# Patient Record
Sex: MALE | Race: WHITE | NOT HISPANIC OR LATINO | Employment: OTHER | URBAN - METROPOLITAN AREA
[De-identification: names, ages, dates, MRNs, and addresses within clinical notes are randomized per-mention and may not be internally consistent; named-entity substitution may affect disease eponyms.]

---

## 2017-08-23 ENCOUNTER — HOSPITAL ENCOUNTER (OUTPATIENT)
Dept: FAMILY MEDICINE CLINIC | Facility: CLINIC | Age: 77
Setting detail: SPECIMEN
Discharge: HOME | End: 2017-08-23
Attending: FAMILY MEDICINE | Admitting: FAMILY MEDICINE

## 2017-08-23 LAB
ALBUMIN SERPL-MCNC: 3.7 G/DL (ref 3.5–4.8)
ALBUMIN/GLOB SERPL: 1.3 {RATIO} (ref 1–1.7)
ALP SERPL-CCNC: 58 IU/L (ref 32–91)
ALT SERPL-CCNC: 18 IU/L (ref 17–63)
ANION GAP SERPL CALC-SCNC: 13.3 MMOL/L (ref 10–20)
AST SERPL-CCNC: 19 IU/L (ref 15–41)
BILIRUB SERPL-MCNC: 0.8 MG/DL (ref 0.3–1.2)
BUN SERPL-MCNC: 17 MG/DL (ref 8–20)
BUN/CREAT SERPL: 18.9 (ref 6.2–20.3)
CALCIUM SERPL-MCNC: 9.6 MG/DL (ref 8.9–10.3)
CHLORIDE SERPL-SCNC: 107 MMOL/L (ref 101–111)
CHOLEST SERPL-MCNC: 193 MG/DL
CHOLEST/HDLC SERPL: 3 {RATIO}
CONV CO2: 27 MMOL/L (ref 22–32)
CONV LDL CHOLESTEROL DIRECT: 120 MG/DL (ref 0–100)
CONV TOTAL PROTEIN: 6.5 G/DL (ref 6.1–7.9)
CREAT UR-MCNC: 0.9 MG/DL (ref 0.7–1.2)
GLOBULIN UR ELPH-MCNC: 2.8 G/DL (ref 2.5–3.8)
GLUCOSE SERPL-MCNC: 140 MG/DL (ref 65–99)
HDLC SERPL-MCNC: 65 MG/DL
LDLC/HDLC SERPL: 1.8 {RATIO}
LIPID INTERPRETATION: ABNORMAL
POTASSIUM SERPL-SCNC: 4.3 MMOL/L (ref 3.6–5.1)
SODIUM SERPL-SCNC: 143 MMOL/L (ref 136–144)
TRIGL SERPL-MCNC: 86 MG/DL
VLDLC SERPL CALC-MCNC: 8.1 MG/DL

## 2017-11-20 ENCOUNTER — HOSPITAL ENCOUNTER (OUTPATIENT)
Dept: FAMILY MEDICINE CLINIC | Facility: CLINIC | Age: 77
Setting detail: SPECIMEN
Discharge: HOME | End: 2017-11-20
Attending: FAMILY MEDICINE | Admitting: FAMILY MEDICINE

## 2017-11-20 LAB
ALBUMIN SERPL-MCNC: 3.7 G/DL (ref 3.5–4.8)
ALBUMIN/GLOB SERPL: 1.4 {RATIO} (ref 1–1.7)
ALP SERPL-CCNC: 61 IU/L (ref 32–91)
ALT SERPL-CCNC: 15 IU/L (ref 17–63)
ANION GAP SERPL CALC-SCNC: 13.1 MMOL/L (ref 10–20)
AST SERPL-CCNC: 16 IU/L (ref 15–41)
BILIRUB SERPL-MCNC: 0.7 MG/DL (ref 0.3–1.2)
BUN SERPL-MCNC: 15 MG/DL (ref 8–20)
BUN/CREAT SERPL: 16.7 (ref 6.2–20.3)
CALCIUM SERPL-MCNC: 8.9 MG/DL (ref 8.9–10.3)
CHLORIDE SERPL-SCNC: 101 MMOL/L (ref 101–111)
CONV CO2: 27 MMOL/L (ref 22–32)
CONV MICROALBUM.,U,RANDOM: <2 MG/L
CONV TOTAL PROTEIN: 6.4 G/DL (ref 6.1–7.9)
CREAT UR-MCNC: 0.9 MG/DL (ref 0.7–1.2)
GLOBULIN UR ELPH-MCNC: 2.7 G/DL (ref 2.5–3.8)
GLUCOSE SERPL-MCNC: 118 MG/DL (ref 65–99)
POTASSIUM SERPL-SCNC: 4.1 MMOL/L (ref 3.6–5.1)
SODIUM SERPL-SCNC: 137 MMOL/L (ref 136–144)

## 2018-05-15 ENCOUNTER — HOSPITAL ENCOUNTER (OUTPATIENT)
Dept: FAMILY MEDICINE CLINIC | Facility: CLINIC | Age: 78
Setting detail: SPECIMEN
Discharge: HOME | End: 2018-05-15
Attending: FAMILY MEDICINE | Admitting: FAMILY MEDICINE

## 2018-05-15 LAB
ALBUMIN SERPL-MCNC: 3.8 G/DL (ref 3.5–4.8)
ALBUMIN/GLOB SERPL: 1.4 {RATIO} (ref 1–1.7)
ALP SERPL-CCNC: 63 IU/L (ref 32–91)
ALT SERPL-CCNC: 16 IU/L (ref 17–63)
ANION GAP SERPL CALC-SCNC: 8.5 MMOL/L (ref 10–20)
AST SERPL-CCNC: 17 IU/L (ref 15–41)
BILIRUB SERPL-MCNC: 0.7 MG/DL (ref 0.3–1.2)
BUN SERPL-MCNC: 17 MG/DL (ref 8–20)
BUN/CREAT SERPL: 18.9 (ref 6.2–20.3)
CALCIUM SERPL-MCNC: 8.8 MG/DL (ref 8.9–10.3)
CHLORIDE SERPL-SCNC: 103 MMOL/L (ref 101–111)
CONV CO2: 28 MMOL/L (ref 22–32)
CONV TOTAL PROTEIN: 6.5 G/DL (ref 6.1–7.9)
CREAT UR-MCNC: 0.9 MG/DL (ref 0.7–1.2)
GLOBULIN UR ELPH-MCNC: 2.7 G/DL (ref 2.5–3.8)
GLUCOSE SERPL-MCNC: 125 MG/DL (ref 65–99)
POTASSIUM SERPL-SCNC: 3.5 MMOL/L (ref 3.6–5.1)
SODIUM SERPL-SCNC: 136 MMOL/L (ref 136–144)

## 2018-09-18 ENCOUNTER — HOSPITAL ENCOUNTER (OUTPATIENT)
Dept: FAMILY MEDICINE CLINIC | Facility: CLINIC | Age: 78
Setting detail: SPECIMEN
Discharge: HOME | End: 2018-09-18
Attending: FAMILY MEDICINE | Admitting: FAMILY MEDICINE

## 2018-09-18 LAB
ALBUMIN SERPL-MCNC: 3.4 G/DL (ref 3.5–4.8)
ALBUMIN/GLOB SERPL: 1.4 {RATIO} (ref 1–1.7)
ALP SERPL-CCNC: 55 IU/L (ref 32–91)
ALT SERPL-CCNC: 13 IU/L (ref 17–63)
ANION GAP SERPL CALC-SCNC: 10.1 MMOL/L (ref 10–20)
AST SERPL-CCNC: 13 IU/L (ref 15–41)
BILIRUB SERPL-MCNC: 0.3 MG/DL (ref 0.3–1.2)
BUN SERPL-MCNC: 11 MG/DL (ref 8–20)
BUN/CREAT SERPL: 12.2 (ref 6.2–20.3)
CALCIUM SERPL-MCNC: 8.7 MG/DL (ref 8.9–10.3)
CHLORIDE SERPL-SCNC: 106 MMOL/L (ref 101–111)
CHOLEST SERPL-MCNC: 157 MG/DL
CHOLEST/HDLC SERPL: 3 {RATIO}
CONV CO2: 28 MMOL/L (ref 22–32)
CONV LDL CHOLESTEROL DIRECT: 95 MG/DL (ref 0–100)
CONV TOTAL PROTEIN: 5.9 G/DL (ref 6.1–7.9)
CREAT UR-MCNC: 0.9 MG/DL (ref 0.7–1.2)
GLOBULIN UR ELPH-MCNC: 2.5 G/DL (ref 2.5–3.8)
GLUCOSE SERPL-MCNC: 119 MG/DL (ref 65–99)
HDLC SERPL-MCNC: 52 MG/DL
LDLC/HDLC SERPL: 1.8 {RATIO}
LIPID INTERPRETATION: NORMAL
POTASSIUM SERPL-SCNC: 4.1 MMOL/L (ref 3.6–5.1)
SODIUM SERPL-SCNC: 140 MMOL/L (ref 136–144)
TRIGL SERPL-MCNC: 96 MG/DL
VLDLC SERPL CALC-MCNC: 9.8 MG/DL

## 2019-02-11 ENCOUNTER — HOSPITAL ENCOUNTER (OUTPATIENT)
Dept: FAMILY MEDICINE CLINIC | Facility: CLINIC | Age: 79
Setting detail: SPECIMEN
Discharge: HOME | End: 2019-02-11
Attending: FAMILY MEDICINE | Admitting: FAMILY MEDICINE

## 2019-02-12 ENCOUNTER — HOSPITAL ENCOUNTER (OUTPATIENT)
Dept: FAMILY MEDICINE CLINIC | Facility: CLINIC | Age: 79
Setting detail: SPECIMEN
Discharge: HOME | End: 2019-02-12
Attending: FAMILY MEDICINE | Admitting: FAMILY MEDICINE

## 2019-02-12 LAB
ALBUMIN SERPL-MCNC: 3.8 G/DL (ref 3.5–4.8)
ALBUMIN/GLOB SERPL: 1.3 {RATIO} (ref 1–1.7)
ALP SERPL-CCNC: 64 IU/L (ref 32–91)
ALT SERPL-CCNC: 15 IU/L (ref 17–63)
ANION GAP SERPL CALC-SCNC: 15.8 MMOL/L (ref 10–20)
AST SERPL-CCNC: 18 IU/L (ref 15–41)
BILIRUB SERPL-MCNC: 0.7 MG/DL (ref 0.3–1.2)
BUN SERPL-MCNC: 18 MG/DL (ref 8–20)
BUN/CREAT SERPL: 18 (ref 6.2–20.3)
CALCIUM SERPL-MCNC: 9.2 MG/DL (ref 8.9–10.3)
CHLORIDE SERPL-SCNC: 102 MMOL/L (ref 101–111)
CHOLEST SERPL-MCNC: 179 MG/DL
CHOLEST/HDLC SERPL: 2.9 {RATIO}
CONV CO2: 26 MMOL/L (ref 22–32)
CONV LDL CHOLESTEROL DIRECT: 107 MG/DL (ref 0–100)
CONV TOTAL PROTEIN: 6.8 G/DL (ref 6.1–7.9)
CREAT UR-MCNC: 1 MG/DL (ref 0.7–1.2)
GLOBULIN UR ELPH-MCNC: 3 G/DL (ref 2.5–3.8)
GLUCOSE SERPL-MCNC: 123 MG/DL (ref 65–99)
HDLC SERPL-MCNC: 61 MG/DL
LDLC/HDLC SERPL: 1.8 {RATIO}
LIPID INTERPRETATION: ABNORMAL
POTASSIUM SERPL-SCNC: 3.8 MMOL/L (ref 3.6–5.1)
SODIUM SERPL-SCNC: 140 MMOL/L (ref 136–144)
TRIGL SERPL-MCNC: 109 MG/DL
VLDLC SERPL CALC-MCNC: 10.9 MG/DL

## 2019-06-27 RX ORDER — LEVOTHYROXINE SODIUM 0.03 MG/1
TABLET ORAL
Qty: 90 TABLET | Refills: 3 | Status: SHIPPED | OUTPATIENT
Start: 2019-06-27 | End: 2020-06-26

## 2019-06-27 RX ORDER — LOSARTAN POTASSIUM 50 MG/1
TABLET ORAL
Qty: 90 TABLET | Refills: 3 | Status: SHIPPED | OUTPATIENT
Start: 2019-06-27 | End: 2020-06-26

## 2019-06-27 RX ORDER — TRAMADOL HYDROCHLORIDE 50 MG/1
50 TABLET ORAL EVERY 8 HOURS PRN
Qty: 90 TABLET | Refills: 1 | Status: SHIPPED | OUTPATIENT
Start: 2019-06-27 | End: 2019-09-23 | Stop reason: SDUPTHER

## 2019-09-03 ENCOUNTER — HOSPITAL ENCOUNTER (OUTPATIENT)
Dept: CARDIOLOGY | Facility: HOSPITAL | Age: 79
Discharge: HOME | End: 2019-09-03
Admitting: FAMILY MEDICINE

## 2019-09-03 ENCOUNTER — OFFICE VISIT (OUTPATIENT)
Dept: FAMILY MEDICINE CLINIC | Facility: CLINIC | Age: 79
End: 2019-09-03

## 2019-09-03 VITALS
SYSTOLIC BLOOD PRESSURE: 160 MMHG | WEIGHT: 194 LBS | OXYGEN SATURATION: 99 % | HEIGHT: 64 IN | BODY MASS INDEX: 33.12 KG/M2 | HEART RATE: 74 BPM | DIASTOLIC BLOOD PRESSURE: 81 MMHG | TEMPERATURE: 97.7 F

## 2019-09-03 DIAGNOSIS — E03.9 HYPOTHYROIDISM, UNSPECIFIED TYPE: ICD-10-CM

## 2019-09-03 DIAGNOSIS — I10 HYPERTENSION, UNSPECIFIED TYPE: ICD-10-CM

## 2019-09-03 DIAGNOSIS — E11.8 DISORDER ASSOCIATED WITH TYPE 2 DIABETES MELLITUS (HCC): ICD-10-CM

## 2019-09-03 DIAGNOSIS — M79.661 RIGHT CALF PAIN: Primary | ICD-10-CM

## 2019-09-03 DIAGNOSIS — E78.5 HYPERLIPIDEMIA, UNSPECIFIED HYPERLIPIDEMIA TYPE: ICD-10-CM

## 2019-09-03 LAB
BH CV LOW VAS RIGHT POPLITEAL SPONT: 1
BH CV LOWER VASCULAR LEFT COMMON FEMORAL AUGMENT: NORMAL
BH CV LOWER VASCULAR LEFT COMMON FEMORAL COMPETENT: NORMAL
BH CV LOWER VASCULAR LEFT COMMON FEMORAL COMPRESS: NORMAL
BH CV LOWER VASCULAR LEFT COMMON FEMORAL PHASIC: NORMAL
BH CV LOWER VASCULAR LEFT COMMON FEMORAL SPONT: NORMAL
BH CV LOWER VASCULAR RIGHT COMMON FEMORAL AUGMENT: NORMAL
BH CV LOWER VASCULAR RIGHT COMMON FEMORAL COMPETENT: NORMAL
BH CV LOWER VASCULAR RIGHT COMMON FEMORAL COMPRESS: NORMAL
BH CV LOWER VASCULAR RIGHT COMMON FEMORAL PHASIC: NORMAL
BH CV LOWER VASCULAR RIGHT COMMON FEMORAL SPONT: NORMAL
BH CV LOWER VASCULAR RIGHT DISTAL FEMORAL COMPRESS: NORMAL
BH CV LOWER VASCULAR RIGHT GASTRONEMIUS COMPRESS: NORMAL
BH CV LOWER VASCULAR RIGHT GREATER SAPH AK COMPRESS: NORMAL
BH CV LOWER VASCULAR RIGHT GREATER SAPH BK COMPRESS: NORMAL
BH CV LOWER VASCULAR RIGHT LESSER SAPH COMPRESS: NORMAL
BH CV LOWER VASCULAR RIGHT MID FEMORAL AUGMENT: NORMAL
BH CV LOWER VASCULAR RIGHT MID FEMORAL COMPETENT: NORMAL
BH CV LOWER VASCULAR RIGHT MID FEMORAL COMPRESS: NORMAL
BH CV LOWER VASCULAR RIGHT MID FEMORAL PHASIC: NORMAL
BH CV LOWER VASCULAR RIGHT MID FEMORAL SPONT: NORMAL
BH CV LOWER VASCULAR RIGHT PERONEAL COMPRESS: NORMAL
BH CV LOWER VASCULAR RIGHT POPLITEAL AUGMENT: NORMAL
BH CV LOWER VASCULAR RIGHT POPLITEAL COMPETENT: NORMAL
BH CV LOWER VASCULAR RIGHT POPLITEAL COMPRESS: NORMAL
BH CV LOWER VASCULAR RIGHT POPLITEAL PHASIC: NORMAL
BH CV LOWER VASCULAR RIGHT POPLITEAL SPONT: NORMAL
BH CV LOWER VASCULAR RIGHT POSTERIOR TIBIAL COMPRESS: NORMAL
BH CV LOWER VASCULAR RIGHT PROXIMAL FEMORAL COMPRESS: NORMAL
BH CV LOWER VASCULAR RIGHT SAPHENOFEMORAL JUNCTION COMPRESS: NORMAL

## 2019-09-03 PROCEDURE — 93971 EXTREMITY STUDY: CPT

## 2019-09-03 PROCEDURE — 99213 OFFICE O/P EST LOW 20 MIN: CPT | Performed by: FAMILY MEDICINE

## 2019-09-03 RX ORDER — HYDROCODONE BITARTRATE AND ACETAMINOPHEN 10; 325 MG/1; MG/1
1 TABLET ORAL EVERY 6 HOURS PRN
Qty: 28 TABLET | Refills: 0 | Status: SHIPPED | OUTPATIENT
Start: 2019-09-03 | End: 2019-10-07 | Stop reason: SDUPTHER

## 2019-09-03 RX ORDER — TAMSULOSIN HYDROCHLORIDE 0.4 MG/1
CAPSULE ORAL
COMMUNITY
Start: 2019-06-27

## 2019-09-03 RX ORDER — SIMVASTATIN 80 MG
TABLET ORAL
COMMUNITY
Start: 2019-06-27 | End: 2019-12-23

## 2019-09-04 LAB
ALBUMIN SERPL-MCNC: 3.6 G/DL (ref 3.5–4.8)
ALBUMIN/GLOB SERPL: 1.3 G/DL (ref 1–1.7)
ALP SERPL-CCNC: 50 U/L (ref 32–91)
ALT SERPL W P-5'-P-CCNC: 12 U/L (ref 17–63)
ANION GAP SERPL CALCULATED.3IONS-SCNC: 12.6 MMOL/L (ref 5–15)
ARTICHOKE IGE QN: 107 MG/DL (ref 0–100)
AST SERPL-CCNC: 15 U/L (ref 15–41)
BASOPHILS # BLD AUTO: 0 10*3/MM3 (ref 0–0.2)
BASOPHILS NFR BLD AUTO: 0.6 % (ref 0–1.5)
BILIRUB SERPL-MCNC: 0.7 MG/DL (ref 0.3–1.2)
BUN BLD-MCNC: 21 MG/DL (ref 8–20)
BUN/CREAT SERPL: 21 (ref 6.2–20.3)
CALCIUM SPEC-SCNC: 9.2 MG/DL (ref 8.9–10.3)
CHLORIDE SERPL-SCNC: 106 MMOL/L (ref 101–111)
CHOLEST SERPL-MCNC: 176 MG/DL
CO2 SERPL-SCNC: 25 MMOL/L (ref 22–32)
CREAT BLD-MCNC: 1 MG/DL (ref 0.7–1.2)
DEPRECATED RDW RBC AUTO: 45.9 FL (ref 37–54)
EOSINOPHIL # BLD AUTO: 0.3 10*3/MM3 (ref 0–0.4)
EOSINOPHIL NFR BLD AUTO: 6.1 % (ref 0.3–6.2)
ERYTHROCYTE [DISTWIDTH] IN BLOOD BY AUTOMATED COUNT: 14.8 % (ref 12.3–15.4)
GFR SERPL CREATININE-BSD FRML MDRD: 72 ML/MIN/1.73
GLOBULIN UR ELPH-MCNC: 2.7 GM/DL (ref 2.5–3.8)
GLUCOSE BLD-MCNC: 111 MG/DL (ref 65–99)
HBA1C MFR BLD: 5.8 % (ref 3.5–5.6)
HCT VFR BLD AUTO: 42.6 % (ref 37.5–51)
HDLC SERPL QL: 3.2
HDLC SERPL-MCNC: 55 MG/DL
HGB BLD-MCNC: 14.4 G/DL (ref 13–17.7)
LDLC/HDLC SERPL: 1.85 {RATIO}
LYMPHOCYTES # BLD AUTO: 0.5 10*3/MM3 (ref 0.7–3.1)
LYMPHOCYTES NFR BLD AUTO: 11.4 % (ref 19.6–45.3)
MCH RBC QN AUTO: 30.3 PG (ref 26.6–33)
MCHC RBC AUTO-ENTMCNC: 33.9 G/DL (ref 31.5–35.7)
MCV RBC AUTO: 89.3 FL (ref 79–97)
MONOCYTES # BLD AUTO: 0.4 10*3/MM3 (ref 0.1–0.9)
MONOCYTES NFR BLD AUTO: 9 % (ref 5–12)
NEUTROPHILS # BLD AUTO: 3.4 10*3/MM3 (ref 1.7–7)
NEUTROPHILS NFR BLD AUTO: 72.9 % (ref 42.7–76)
NRBC BLD AUTO-RTO: 0 /100 WBC (ref 0–0.2)
PLATELET # BLD AUTO: 273 10*3/MM3 (ref 140–450)
PMV BLD AUTO: 7.3 FL (ref 6–12)
POTASSIUM BLD-SCNC: 4.6 MMOL/L (ref 3.6–5.1)
PROT SERPL-MCNC: 6.3 G/DL (ref 6.1–7.9)
RBC # BLD AUTO: 4.77 10*6/MM3 (ref 4.14–5.8)
SODIUM BLD-SCNC: 139 MMOL/L (ref 136–144)
TRIGL SERPL-MCNC: 97 MG/DL
TSH SERPL DL<=0.05 MIU/L-ACNC: 3.95 UIU/ML (ref 0.34–5.6)
VLDLC SERPL-MCNC: 19.4 MG/DL
WBC NRBC COR # BLD: 4.7 10*3/MM3 (ref 3.4–10.8)

## 2019-09-04 PROCEDURE — 85025 COMPLETE CBC W/AUTO DIFF WBC: CPT | Performed by: FAMILY MEDICINE

## 2019-09-04 PROCEDURE — 36415 COLL VENOUS BLD VENIPUNCTURE: CPT | Performed by: FAMILY MEDICINE

## 2019-09-04 PROCEDURE — 83036 HEMOGLOBIN GLYCOSYLATED A1C: CPT | Performed by: FAMILY MEDICINE

## 2019-09-04 PROCEDURE — 80053 COMPREHEN METABOLIC PANEL: CPT | Performed by: FAMILY MEDICINE

## 2019-09-04 PROCEDURE — 84443 ASSAY THYROID STIM HORMONE: CPT | Performed by: FAMILY MEDICINE

## 2019-09-04 PROCEDURE — 80061 LIPID PANEL: CPT | Performed by: FAMILY MEDICINE

## 2019-09-09 NOTE — PROGRESS NOTES
Subjective   Chief Complaint   Patient presents with   • Leg Pain     rt leg x 6 days     Mark Marie is a 79 y.o. male.     Leg Pain    The incident occurred 2 days ago. The incident occurred in the yard. The injury mechanism is unknown. The pain is present in the right leg and right knee. The quality of the pain is described as aching. The pain is severe. The pain has been constant since onset. Pertinent negatives include no inability to bear weight, loss of motion, loss of sensation, muscle weakness or numbness. He reports no foreign bodies present. The symptoms are aggravated by movement. He has tried nothing for the symptoms.      Past Medical History:   Diagnosis Date   • Diabetes mellitus (CMS/HCC)    • Hyperlipidemia    • Hypertension    • Hypothyroidism      History reviewed. No pertinent surgical history.  Allergies   Allergen Reactions   • Ramipril Unknown (See Comments)     Social History     Socioeconomic History   • Marital status:      Spouse name: Not on file   • Number of children: Not on file   • Years of education: Not on file   • Highest education level: Not on file   Substance and Sexual Activity   • Alcohol use: Yes     Comment: rarely;  caffeine 1c qd   • Drug use: No     Social History     Tobacco Use   Smoking Status Not on file   Smokeless Tobacco Never Used       family history includes Heart disease in his father; Hypertension in his father.  Current Outpatient Medications on File Prior to Visit   Medication Sig Dispense Refill   • metFORMIN (GLUCOPHAGE) 500 MG tablet      • simvastatin (ZOCOR) 80 MG tablet      • tamsulosin (FLOMAX) 0.4 MG capsule 24 hr capsule      • levothyroxine (SYNTHROID, LEVOTHROID) 25 MCG tablet TAKE 1 TABLET BY MOUTH DAILY 90 tablet 3   • losartan (COZAAR) 50 MG tablet TAKE 1 TABLET BY MOUTH DAILY 90 tablet 3   • traMADol (ULTRAM) 50 MG tablet Take 1 tablet by mouth Every 8 (Eight) Hours As Needed for Moderate Pain . 90 tablet 1     No current  "facility-administered medications on file prior to visit.      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    Review of Systems   Constitutional: Negative for chills and fever.   HENT: Negative for sinus pressure and sore throat.    Eyes: Negative for blurred vision.   Respiratory: Negative for cough and shortness of breath.    Cardiovascular: Negative for chest pain and palpitations.   Gastrointestinal: Negative for abdominal pain.   Endocrine: Negative for polyuria.   Skin: Negative for rash.   Neurological: Negative for dizziness, numbness and headache.   Hematological: Negative for adenopathy.   Psychiatric/Behavioral: Negative for depressed mood.       Objective   /81 (BP Location: Left arm, Patient Position: Sitting, Cuff Size: Adult)   Pulse 74   Temp 97.7 °F (36.5 °C) (Oral)   Ht 162.6 cm (64\")   Wt 88 kg (194 lb)   SpO2 99%   BMI 33.30 kg/m²   Physical Exam   Constitutional: He is oriented to person, place, and time. He appears well-developed. No distress.   HENT:   Head: Normocephalic.   Eyes: Conjunctivae and lids are normal.   Neck: Trachea normal. No thyroid mass and no thyromegaly present.   Cardiovascular: Normal rate, regular rhythm and normal heart sounds.   Pulmonary/Chest: Effort normal and breath sounds normal.   Musculoskeletal: Normal range of motion. He exhibits tenderness. He exhibits no edema or deformity.   Right posterior knee   Lymphadenopathy:     He has no cervical adenopathy.   Neurological: He is alert and oriented to person, place, and time.   Skin: Skin is warm and dry.   Psychiatric: He has a normal mood and " affect. His speech is normal and behavior is normal. He is attentive.       Office Visit on 09/03/2019   Component Date Value Ref Range Status   • Right Popliteal Spont 09/03/2019 1   Final   • Right Common Femoral Spont 09/03/2019 Y   Final   • Right Common Femoral Phasic 09/03/2019 Y   Final   • Right Common Femoral Augment 09/03/2019 Y   Final   • Right Common Femoral Competent 09/03/2019 Y   Final   • Right Common Femoral Compress 09/03/2019 C   Final   • Right Saphenofemoral Junction Comp* 09/03/2019 C   Final   • Right Proximal Femoral Compress 09/03/2019 C   Final   • Right Mid Femoral Spont 09/03/2019 Y   Final   • Right Mid Femoral Phasic 09/03/2019 Y   Final   • Right Mid Femoral Augment 09/03/2019 Y   Final   • Right Mid Femoral Competent 09/03/2019 Y   Final   • Right Mid Femoral Compress 09/03/2019 C   Final   • Right Distal Femoral Compress 09/03/2019 C   Final   • Right Popliteal Spont 09/03/2019 Y   Final   • Right Popliteal Phasic 09/03/2019 Y   Final   • Right Popliteal Augment 09/03/2019 Y   Final   • Right Popliteal Competent 09/03/2019 Y   Final   • Right Popliteal Compress 09/03/2019 C   Final   • Right Posterior Tibial Compress 09/03/2019 C   Final   • Right Peroneal Compress 09/03/2019 C   Final   • Right GastronemiusSoleal Compress 09/03/2019 C   Final   • Right Greater Saph AK Compress 09/03/2019 C   Final   • Right Greater Saph BK Compress 09/03/2019 C   Final   • Right Lesser Saph Compress 09/03/2019 C   Final   • Left Common Femoral Spont 09/03/2019 Y   Final   • Left Common Femoral Phasic 09/03/2019 Y   Final   • Left Common Femoral Augment 09/03/2019 Y   Final   • Left Common Femoral Competent 09/03/2019 Y   Final   • Left Common Femoral Compress 09/03/2019 C   Final   • Glucose 09/04/2019 111* 65 - 99 mg/dL Final   • BUN 09/04/2019 21* 8 - 20 mg/dL Final   • Creatinine 09/04/2019 1.00  0.70 - 1.20 mg/dL Final   • Sodium 09/04/2019 139  136 - 144 mmol/L Final   • Potassium 09/04/2019  4.6  3.6 - 5.1 mmol/L Final   • Chloride 09/04/2019 106  101 - 111 mmol/L Final   • CO2 09/04/2019 25.0  22.0 - 32.0 mmol/L Final   • Calcium 09/04/2019 9.2  8.9 - 10.3 mg/dL Final   • Total Protein 09/04/2019 6.3  6.1 - 7.9 g/dL Final   • Albumin 09/04/2019 3.60  3.50 - 4.80 g/dL Final   • ALT (SGPT) 09/04/2019 12* 17 - 63 U/L Final   • AST (SGOT) 09/04/2019 15  15 - 41 U/L Final   • Alkaline Phosphatase 09/04/2019 50  32 - 91 U/L Final   • Total Bilirubin 09/04/2019 0.7  0.3 - 1.2 mg/dL Final   • eGFR Non African Amer 09/04/2019 72  >60 mL/min/1.73 Final   • Globulin 09/04/2019 2.7  2.5 - 3.8 gm/dL Final   • A/G Ratio 09/04/2019 1.3  1.0 - 1.7 g/dL Final   • BUN/Creatinine Ratio 09/04/2019 21.0* 6.2 - 20.3 Final   • Anion Gap 09/04/2019 12.6  5.0 - 15.0 mmol/L Final   • Hemoglobin A1C 09/04/2019 5.8* 3.5 - 5.6 % Final   • Total Cholesterol 09/04/2019 176  <=200 mg/dL Final   • Triglycerides 09/04/2019 97  <=150 mg/dL Final   • HDL Cholesterol 09/04/2019 55  >=39 mg/dL Final   • LDL Cholesterol  09/04/2019 107* 0 - 100 mg/dL Final   • VLDL Cholesterol 09/04/2019 19.4  mg/dL Final   • LDL/HDL Ratio 09/04/2019 1.85   Final   • Chol/HDL Ratio 09/04/2019 3.20   Final   • TSH 09/04/2019 3.950  0.340 - 5.600 uIU/mL Final    Results may be falsely decreased if patient taking Biotin.   • WBC 09/04/2019 4.70  3.40 - 10.80 10*3/mm3 Final   • RBC 09/04/2019 4.77  4.14 - 5.80 10*6/mm3 Final   • Hemoglobin 09/04/2019 14.4  13.0 - 17.7 g/dL Final   • Hematocrit 09/04/2019 42.6  37.5 - 51.0 % Final   • MCV 09/04/2019 89.3  79.0 - 97.0 fL Final   • MCH 09/04/2019 30.3  26.6 - 33.0 pg Final   • MCHC 09/04/2019 33.9  31.5 - 35.7 g/dL Final   • RDW 09/04/2019 14.8  12.3 - 15.4 % Final   • RDW-SD 09/04/2019 45.9  37.0 - 54.0 fl Final   • MPV 09/04/2019 7.3  6.0 - 12.0 fL Final   • Platelets 09/04/2019 273  140 - 450 10*3/mm3 Final   • Neutrophil % 09/04/2019 72.9  42.7 - 76.0 % Final   • Lymphocyte % 09/04/2019 11.4* 19.6 - 45.3 %  Final   • Monocyte % 09/04/2019 9.0  5.0 - 12.0 % Final   • Eosinophil % 09/04/2019 6.1  0.3 - 6.2 % Final   • Basophil % 09/04/2019 0.6  0.0 - 1.5 % Final   • Neutrophils, Absolute 09/04/2019 3.40  1.70 - 7.00 10*3/mm3 Final   • Lymphocytes, Absolute 09/04/2019 0.50* 0.70 - 3.10 10*3/mm3 Final   • Monocytes, Absolute 09/04/2019 0.40  0.10 - 0.90 10*3/mm3 Final   • Eosinophils, Absolute 09/04/2019 0.30  0.00 - 0.40 10*3/mm3 Final   • Basophils, Absolute 09/04/2019 0.00  0.00 - 0.20 10*3/mm3 Final   • nRBC 09/04/2019 0.0  0.0 - 0.2 /100 WBC Final           Assessment/Plan   Problems Addressed this Visit        Cardiovascular and Mediastinum    Hyperlipidemia    Relevant Medications    simvastatin (ZOCOR) 80 MG tablet    Other Relevant Orders    CBC & Differential (Completed)    Comprehensive Metabolic Panel (Completed)    Hemoglobin A1c (Completed)    Lipid Panel (Completed)    TSH (Completed)    CBC Auto Differential (Completed)    Hypertension    Relevant Orders    CBC & Differential (Completed)    Comprehensive Metabolic Panel (Completed)    Hemoglobin A1c (Completed)    Lipid Panel (Completed)    TSH (Completed)    CBC Auto Differential (Completed)       Endocrine    Disorder associated with type 2 diabetes mellitus (CMS/[facility])    Relevant Medications    metFORMIN (GLUCOPHAGE) 500 MG tablet    Other Relevant Orders    Hemoglobin A1c (Completed)    Hypothyroidism       Nervous and Auditory    Right calf pain - Primary    Relevant Orders    Duplex Venous Lower Extremity - Right CAR (Completed)    TSH (Completed)          Mark was seen today for leg pain.    Diagnoses and all orders for this visit:    Right calf pain  -     Duplex Venous Lower Extremity - Right CAR  -     TSH    Hyperlipidemia, unspecified hyperlipidemia type  -     CBC & Differential  -     Comprehensive Metabolic Panel  -     Hemoglobin A1c  -     Lipid Panel  -     TSH  -     CBC Auto Differential    Hypertension, unspecified type  -     CBC &  Differential  -     Comprehensive Metabolic Panel  -     Hemoglobin A1c  -     Lipid Panel  -     TSH  -     CBC Auto Differential    Hypothyroidism, unspecified type    Disorder associated with type 2 diabetes mellitus (CMS/HCC)  -     Hemoglobin A1c    Other orders  -     HYDROcodone-acetaminophen (NORCO)  MG per tablet; Take 1 tablet by mouth Every 6 (Six) Hours As Needed for Moderate Pain .

## 2019-09-24 RX ORDER — TRAMADOL HYDROCHLORIDE 50 MG/1
50 TABLET ORAL EVERY 8 HOURS PRN
Qty: 90 TABLET | Refills: 0 | Status: SHIPPED | OUTPATIENT
Start: 2019-09-24 | End: 2019-11-25 | Stop reason: SDUPTHER

## 2019-09-24 RX ORDER — FUROSEMIDE 40 MG/1
TABLET ORAL
Qty: 90 TABLET | Refills: 3 | Status: SHIPPED | OUTPATIENT
Start: 2019-09-24 | End: 2022-01-19

## 2019-09-24 RX ORDER — POTASSIUM CHLORIDE 20 MEQ/1
TABLET, EXTENDED RELEASE ORAL
Qty: 90 TABLET | Refills: 3 | Status: SHIPPED | OUTPATIENT
Start: 2019-09-24 | End: 2022-07-13

## 2019-10-07 RX ORDER — HYDROCODONE BITARTRATE AND ACETAMINOPHEN 10; 325 MG/1; MG/1
1 TABLET ORAL EVERY 6 HOURS PRN
Qty: 28 TABLET | Refills: 0 | Status: SHIPPED | OUTPATIENT
Start: 2019-10-07 | End: 2021-09-21

## 2019-11-25 RX ORDER — TRAMADOL HYDROCHLORIDE 50 MG/1
50 TABLET ORAL EVERY 8 HOURS PRN
Qty: 90 TABLET | Refills: 0 | Status: SHIPPED | OUTPATIENT
Start: 2019-11-25 | End: 2019-12-26

## 2019-12-23 RX ORDER — SIMVASTATIN 80 MG
TABLET ORAL
Qty: 90 TABLET | Refills: 3 | Status: SHIPPED | OUTPATIENT
Start: 2019-12-23 | End: 2020-10-05 | Stop reason: SDUPTHER

## 2019-12-24 DIAGNOSIS — M54.50 CHRONIC BILATERAL LOW BACK PAIN WITHOUT SCIATICA: Primary | ICD-10-CM

## 2019-12-24 DIAGNOSIS — G89.29 CHRONIC BILATERAL LOW BACK PAIN WITHOUT SCIATICA: Primary | ICD-10-CM

## 2019-12-26 RX ORDER — TRAMADOL HYDROCHLORIDE 50 MG/1
50 TABLET ORAL EVERY 8 HOURS PRN
Qty: 90 TABLET | Refills: 0 | Status: SHIPPED | OUTPATIENT
Start: 2019-12-26 | End: 2020-01-28

## 2020-01-28 DIAGNOSIS — M54.50 CHRONIC BILATERAL LOW BACK PAIN WITHOUT SCIATICA: ICD-10-CM

## 2020-01-28 DIAGNOSIS — G89.29 CHRONIC BILATERAL LOW BACK PAIN WITHOUT SCIATICA: ICD-10-CM

## 2020-01-28 RX ORDER — TRAMADOL HYDROCHLORIDE 50 MG/1
50 TABLET ORAL EVERY 8 HOURS PRN
Qty: 90 TABLET | Refills: 0 | Status: SHIPPED | OUTPATIENT
Start: 2020-01-28 | End: 2020-03-03

## 2020-03-03 DIAGNOSIS — G89.29 CHRONIC BILATERAL LOW BACK PAIN WITHOUT SCIATICA: ICD-10-CM

## 2020-03-03 DIAGNOSIS — M54.50 CHRONIC BILATERAL LOW BACK PAIN WITHOUT SCIATICA: ICD-10-CM

## 2020-03-03 RX ORDER — TRAMADOL HYDROCHLORIDE 50 MG/1
50 TABLET ORAL EVERY 8 HOURS PRN
Qty: 90 TABLET | Refills: 0 | Status: SHIPPED | OUTPATIENT
Start: 2020-03-03 | End: 2020-03-27

## 2020-03-27 DIAGNOSIS — M54.50 CHRONIC BILATERAL LOW BACK PAIN WITHOUT SCIATICA: ICD-10-CM

## 2020-03-27 DIAGNOSIS — G89.29 CHRONIC BILATERAL LOW BACK PAIN WITHOUT SCIATICA: ICD-10-CM

## 2020-03-27 RX ORDER — TRAMADOL HYDROCHLORIDE 50 MG/1
50 TABLET ORAL EVERY 8 HOURS PRN
Qty: 90 TABLET | Refills: 0 | Status: SHIPPED | OUTPATIENT
Start: 2020-03-27 | End: 2020-05-01

## 2020-04-30 DIAGNOSIS — G89.29 CHRONIC BILATERAL LOW BACK PAIN WITHOUT SCIATICA: ICD-10-CM

## 2020-04-30 DIAGNOSIS — M54.50 CHRONIC BILATERAL LOW BACK PAIN WITHOUT SCIATICA: ICD-10-CM

## 2020-05-01 RX ORDER — TRAMADOL HYDROCHLORIDE 50 MG/1
50 TABLET ORAL EVERY 8 HOURS PRN
Qty: 90 TABLET | Refills: 1 | Status: SHIPPED | OUTPATIENT
Start: 2020-05-01 | End: 2020-06-26

## 2020-06-26 DIAGNOSIS — M54.50 CHRONIC BILATERAL LOW BACK PAIN WITHOUT SCIATICA: ICD-10-CM

## 2020-06-26 DIAGNOSIS — G89.29 CHRONIC BILATERAL LOW BACK PAIN WITHOUT SCIATICA: ICD-10-CM

## 2020-06-26 RX ORDER — LEVOTHYROXINE SODIUM 0.03 MG/1
TABLET ORAL
Qty: 90 TABLET | Refills: 0 | Status: SHIPPED | OUTPATIENT
Start: 2020-06-26 | End: 2020-10-05 | Stop reason: SDUPTHER

## 2020-06-26 RX ORDER — LOSARTAN POTASSIUM 50 MG/1
TABLET ORAL
Qty: 90 TABLET | Refills: 0 | Status: SHIPPED | OUTPATIENT
Start: 2020-06-26 | End: 2020-10-05 | Stop reason: SDUPTHER

## 2020-06-26 RX ORDER — TRAMADOL HYDROCHLORIDE 50 MG/1
50 TABLET ORAL EVERY 8 HOURS PRN
Qty: 90 TABLET | Refills: 0 | Status: SHIPPED | OUTPATIENT
Start: 2020-06-26 | End: 2020-08-05 | Stop reason: SDUPTHER

## 2020-07-27 ENCOUNTER — LAB (OUTPATIENT)
Dept: FAMILY MEDICINE CLINIC | Facility: CLINIC | Age: 80
End: 2020-07-27

## 2020-07-27 ENCOUNTER — OFFICE VISIT (OUTPATIENT)
Dept: FAMILY MEDICINE CLINIC | Facility: CLINIC | Age: 80
End: 2020-07-27

## 2020-07-27 VITALS
HEIGHT: 64 IN | DIASTOLIC BLOOD PRESSURE: 77 MMHG | SYSTOLIC BLOOD PRESSURE: 138 MMHG | OXYGEN SATURATION: 96 % | BODY MASS INDEX: 31.92 KG/M2 | WEIGHT: 187 LBS | TEMPERATURE: 97.7 F | HEART RATE: 78 BPM

## 2020-07-27 DIAGNOSIS — E03.9 HYPOTHYROIDISM, UNSPECIFIED TYPE: ICD-10-CM

## 2020-07-27 DIAGNOSIS — E11.8 DISORDER ASSOCIATED WITH TYPE 2 DIABETES MELLITUS (HCC): ICD-10-CM

## 2020-07-27 DIAGNOSIS — Z00.00 WELLNESS EXAMINATION: Primary | ICD-10-CM

## 2020-07-27 DIAGNOSIS — E78.5 HYPERLIPIDEMIA, UNSPECIFIED HYPERLIPIDEMIA TYPE: ICD-10-CM

## 2020-07-27 LAB
ALBUMIN SERPL-MCNC: 3.9 G/DL (ref 3.5–5.2)
ALBUMIN/GLOB SERPL: 1.6 G/DL
ALP SERPL-CCNC: 47 U/L (ref 39–117)
ALT SERPL W P-5'-P-CCNC: 12 U/L (ref 1–41)
ANION GAP SERPL CALCULATED.3IONS-SCNC: 7.1 MMOL/L (ref 5–15)
AST SERPL-CCNC: 13 U/L (ref 1–40)
BILIRUB SERPL-MCNC: 0.3 MG/DL (ref 0–1.2)
BUN SERPL-MCNC: 17 MG/DL (ref 8–23)
BUN/CREAT SERPL: 19.5 (ref 7–25)
CALCIUM SPEC-SCNC: 9.3 MG/DL (ref 8.6–10.5)
CHLORIDE SERPL-SCNC: 106 MMOL/L (ref 98–107)
CHOLEST SERPL-MCNC: 162 MG/DL (ref 0–200)
CO2 SERPL-SCNC: 27.9 MMOL/L (ref 22–29)
CREAT SERPL-MCNC: 0.87 MG/DL (ref 0.76–1.27)
GFR SERPL CREATININE-BSD FRML MDRD: 85 ML/MIN/1.73
GLOBULIN UR ELPH-MCNC: 2.5 GM/DL
GLUCOSE SERPL-MCNC: 108 MG/DL (ref 65–99)
HBA1C MFR BLD: 5.9 % (ref 3.5–5.6)
HDLC SERPL-MCNC: 56 MG/DL (ref 40–60)
LDLC SERPL CALC-MCNC: 90 MG/DL (ref 0–100)
LDLC/HDLC SERPL: 1.6 {RATIO}
POTASSIUM SERPL-SCNC: 4.4 MMOL/L (ref 3.5–5.2)
PROT SERPL-MCNC: 6.4 G/DL (ref 6–8.5)
SODIUM SERPL-SCNC: 141 MMOL/L (ref 136–145)
TRIGL SERPL-MCNC: 82 MG/DL (ref 0–150)
TSH SERPL DL<=0.05 MIU/L-ACNC: 3.31 UIU/ML (ref 0.27–4.2)
VLDLC SERPL-MCNC: 16.4 MG/DL (ref 5–40)

## 2020-07-27 PROCEDURE — 80053 COMPREHEN METABOLIC PANEL: CPT | Performed by: FAMILY MEDICINE

## 2020-07-27 PROCEDURE — 82043 UR ALBUMIN QUANTITATIVE: CPT | Performed by: FAMILY MEDICINE

## 2020-07-27 PROCEDURE — 84443 ASSAY THYROID STIM HORMONE: CPT | Performed by: FAMILY MEDICINE

## 2020-07-27 PROCEDURE — 36415 COLL VENOUS BLD VENIPUNCTURE: CPT | Performed by: FAMILY MEDICINE

## 2020-07-27 PROCEDURE — 83036 HEMOGLOBIN GLYCOSYLATED A1C: CPT | Performed by: FAMILY MEDICINE

## 2020-07-27 PROCEDURE — G0439 PPPS, SUBSEQ VISIT: HCPCS | Performed by: FAMILY MEDICINE

## 2020-07-27 PROCEDURE — 80061 LIPID PANEL: CPT | Performed by: FAMILY MEDICINE

## 2020-07-27 NOTE — PROGRESS NOTES
Medicare Wellness Visit   The ABC's of the Annual Wellness Visit    Chief Complaint   Patient presents with   • Medicare Wellness-subsequent       HPI:  Mark Marie, -1940, is a 80 y.o. male who presents for a Medicare Wellness Visit.    Recent Hospitalizations:  No hospitalization(s) within the last year..    Current Medical Providers:  Patient Care Team:  Diana Linares MD as PCP - General (Family Medicine)  Paresh Moore Jr., MD as PCP - Claims Attributed  Abhijit Jones MD as Consulting Physician (Urology)    Health Habits and Functional and Cognitive Screening and Depression Screening:  Functional & Cognitive Status 2020   Do you have difficulty preparing food and eating? No   Do you have difficulty bathing yourself, getting dressed or grooming yourself? No   Do you have difficulty using the toilet? No   Do you have difficulty moving around from place to place? No   Do you have trouble with steps or getting out of a bed or a chair? No   Current Diet Well Balanced Diet   Dental Exam Up to date   Eye Exam Up to date   Exercise (times per week) 5 times per week   Current Exercise Activities Include Gardening   Do you need help using the phone?  No   Are you deaf or do you have serious difficulty hearing?  No   Do you need help with transportation? No   Do you need help shopping? No   Do you need help preparing meals?  No   Do you need help with housework?  No   Do you need help with laundry? No   Do you need help taking your medications? No   Do you need help managing money? No   Do you ever drive or ride in a car without wearing a seat belt? No   Have you felt unusual stress, anger or loneliness in the last month? No   Who do you live with? Spouse   If you need help, do you have trouble finding someone available to you? No   Do you have difficulty concentrating, remembering or making decisions? No       Compared to one year ago, the patient feels his physical health is the same and his  mental health is the same.    Depression Screen:  PHQ-2/PHQ-9 Depression Screening 7/27/2020   Little interest or pleasure in doing things 0   Feeling down, depressed, or hopeless 0   Total Score 0         Past Medical/Family/Social History:  The following portions of the patient's history were reviewed and updated as appropriate: allergies, current medications, past family history, past medical history, past social history, past surgical history and problem list.    Allergies   Allergen Reactions   • Ramipril Unknown (See Comments)         Current Outpatient Medications:   •  furosemide (LASIX) 40 MG tablet, TAKE 1 TABLET BY MOUTH DAILY, Disp: 90 tablet, Rfl: 3  •  HYDROcodone-acetaminophen (NORCO)  MG per tablet, TAKE 1 TABLET BY MOUTH EVERY 6 (SIX) HOURS AS NEEDED FOR MODERATE PAIN ., Disp: 28 tablet, Rfl: 0  •  levothyroxine (SYNTHROID, LEVOTHROID) 25 MCG tablet, TAKE 1 TABLET BY MOUTH DAILY, Disp: 90 tablet, Rfl: 0  •  losartan (COZAAR) 50 MG tablet, TAKE 1 TABLET BY MOUTH DAILY, Disp: 90 tablet, Rfl: 0  •  metFORMIN (GLUCOPHAGE) 500 MG tablet, TAKE ONE TABLET BY MOUTH TWICE A DAY, Disp: 180 tablet, Rfl: 0  •  potassium chloride (K-DUR,KLOR-CON) 20 MEQ CR tablet, TAKE 1 TABLET BY MOUTH DAILY, Disp: 90 tablet, Rfl: 3  •  simvastatin (ZOCOR) 80 MG tablet, TAKE 1 TABLET BY MOUTH DAILY, Disp: 90 tablet, Rfl: 3  •  tamsulosin (FLOMAX) 0.4 MG capsule 24 hr capsule, , Disp: , Rfl:   •  traMADol (ULTRAM) 50 MG tablet, TAKE 1 TABLET BY MOUTH EVERY 8 (EIGHT) HOURS AS NEEDED FOR MODERATE PAIN ., Disp: 90 tablet, Rfl: 0    Aspirin use counseling: Start ASA 81 mg daily     Current medication list contains no high risk medications.  No harmful drug interactions have been identified.     Family History   Problem Relation Age of Onset   • Heart disease Father    • Hypertension Father        Social History     Tobacco Use   • Smoking status: Never Smoker   • Smokeless tobacco: Never Used   Substance Use Topics   • Alcohol  "use: Yes     Comment: rarely;  caffeine 1c qd       History reviewed. No pertinent surgical history.    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   HENT: Negative for sinus pressure and sore throat.    Eyes: Negative for blurred vision.   Respiratory: Negative for cough and shortness of breath.    Cardiovascular: Negative for chest pain and palpitations.   Gastrointestinal: Negative for abdominal pain.   Endocrine: Negative for polyuria.   Skin: Negative for rash.   Neurological: Negative for dizziness and headache.   Hematological: Negative for adenopathy.   Psychiatric/Behavioral: Negative for depressed mood.       Objective     Vitals:    07/27/20 0859   BP: 138/77   BP Location: Right arm   Patient Position: Sitting   Cuff Size: Adult   Pulse: 78   Temp: 97.7 °F (36.5 °C)   SpO2: 96%   Weight: 84.8 kg (187 lb)   Height: 161.9 cm (63.75\")       Patient's Body mass index is 32.35 kg/m². BMI is above normal parameters. Recommendations include: exercise counseling.      No exam data present    The patient has no evidence of cognitve impairment.     Physical Exam   Constitutional: He is oriented to person, place, and time. He appears well-developed. No distress.   HENT:   Head: Normocephalic.   Eyes: Conjunctivae and lids are normal.   Neck: Trachea normal. No thyroid mass and no thyromegaly present.   Cardiovascular: Normal rate, regular rhythm and normal heart sounds.   Pulmonary/Chest: Effort normal and breath sounds normal.   Lymphadenopathy:     He has no cervical adenopathy.   Neurological: He is alert and oriented to person, place, and time.   Skin: Skin is warm and dry.   Psychiatric: He has a normal mood and affect. His speech is normal and behavior is normal. He is attentive. "       Recent Lab Results:     Lab Results   Component Value Date    CHOL 176 09/04/2019    TRIG 97 09/04/2019    HDL 55 09/04/2019    VLDL 19.4 09/04/2019    LDLHDL 1.85 09/04/2019     No visits with results within 1 Week(s) from this visit.   Latest known visit with results is:   Office Visit on 09/03/2019   Component Date Value Ref Range Status   • Right Popliteal Spont 09/03/2019 1   Final   • Right Common Femoral Spont 09/03/2019 Y   Final   • Right Common Femoral Phasic 09/03/2019 Y   Final   • Right Common Femoral Augment 09/03/2019 Y   Final   • Right Common Femoral Competent 09/03/2019 Y   Final   • Right Common Femoral Compress 09/03/2019 C   Final   • Right Saphenofemoral Junction Comp* 09/03/2019 C   Final   • Right Proximal Femoral Compress 09/03/2019 C   Final   • Right Mid Femoral Spont 09/03/2019 Y   Final   • Right Mid Femoral Phasic 09/03/2019 Y   Final   • Right Mid Femoral Augment 09/03/2019 Y   Final   • Right Mid Femoral Competent 09/03/2019 Y   Final   • Right Mid Femoral Compress 09/03/2019 C   Final   • Right Distal Femoral Compress 09/03/2019 C   Final   • Right Popliteal Spont 09/03/2019 Y   Final   • Right Popliteal Phasic 09/03/2019 Y   Final   • Right Popliteal Augment 09/03/2019 Y   Final   • Right Popliteal Competent 09/03/2019 Y   Final   • Right Popliteal Compress 09/03/2019 C   Final   • Right Posterior Tibial Compress 09/03/2019 C   Final   • Right Peroneal Compress 09/03/2019 C   Final   • Right GastronemiusSoleal Compress 09/03/2019 C   Final   • Right Greater Saph AK Compress 09/03/2019 C   Final   • Right Greater Saph BK Compress 09/03/2019 C   Final   • Right Lesser Saph Compress 09/03/2019 C   Final   • Left Common Femoral Spont 09/03/2019 Y   Final   • Left Common Femoral Phasic 09/03/2019 Y   Final   • Left Common Femoral Augment 09/03/2019 Y   Final   • Left Common Femoral Competent 09/03/2019 Y   Final   • Left Common Femoral Compress 09/03/2019 C   Final   • Glucose  09/04/2019 111* 65 - 99 mg/dL Final   • BUN 09/04/2019 21* 8 - 20 mg/dL Final   • Creatinine 09/04/2019 1.00  0.70 - 1.20 mg/dL Final   • Sodium 09/04/2019 139  136 - 144 mmol/L Final   • Potassium 09/04/2019 4.6  3.6 - 5.1 mmol/L Final   • Chloride 09/04/2019 106  101 - 111 mmol/L Final   • CO2 09/04/2019 25.0  22.0 - 32.0 mmol/L Final   • Calcium 09/04/2019 9.2  8.9 - 10.3 mg/dL Final   • Total Protein 09/04/2019 6.3  6.1 - 7.9 g/dL Final   • Albumin 09/04/2019 3.60  3.50 - 4.80 g/dL Final   • ALT (SGPT) 09/04/2019 12* 17 - 63 U/L Final   • AST (SGOT) 09/04/2019 15  15 - 41 U/L Final   • Alkaline Phosphatase 09/04/2019 50  32 - 91 U/L Final   • Total Bilirubin 09/04/2019 0.7  0.3 - 1.2 mg/dL Final   • eGFR Non African Amer 09/04/2019 72  >60 mL/min/1.73 Final   • Globulin 09/04/2019 2.7  2.5 - 3.8 gm/dL Final   • A/G Ratio 09/04/2019 1.3  1.0 - 1.7 g/dL Final   • BUN/Creatinine Ratio 09/04/2019 21.0* 6.2 - 20.3 Final   • Anion Gap 09/04/2019 12.6  5.0 - 15.0 mmol/L Final   • Hemoglobin A1C 09/04/2019 5.8* 3.5 - 5.6 % Final   • Total Cholesterol 09/04/2019 176  <=200 mg/dL Final   • Triglycerides 09/04/2019 97  <=150 mg/dL Final   • HDL Cholesterol 09/04/2019 55  >=39 mg/dL Final   • LDL Cholesterol  09/04/2019 107* 0 - 100 mg/dL Final   • VLDL Cholesterol 09/04/2019 19.4  mg/dL Final   • LDL/HDL Ratio 09/04/2019 1.85   Final   • Chol/HDL Ratio 09/04/2019 3.20   Final   • TSH 09/04/2019 3.950  0.340 - 5.600 uIU/mL Final    Results may be falsely decreased if patient taking Biotin.   • WBC 09/04/2019 4.70  3.40 - 10.80 10*3/mm3 Final   • RBC 09/04/2019 4.77  4.14 - 5.80 10*6/mm3 Final   • Hemoglobin 09/04/2019 14.4  13.0 - 17.7 g/dL Final   • Hematocrit 09/04/2019 42.6  37.5 - 51.0 % Final   • MCV 09/04/2019 89.3  79.0 - 97.0 fL Final   • MCH 09/04/2019 30.3  26.6 - 33.0 pg Final   • MCHC 09/04/2019 33.9  31.5 - 35.7 g/dL Final   • RDW 09/04/2019 14.8  12.3 - 15.4 % Final   • RDW-SD 09/04/2019 45.9  37.0 - 54.0 fl  Final   • MPV 09/04/2019 7.3  6.0 - 12.0 fL Final   • Platelets 09/04/2019 273  140 - 450 10*3/mm3 Final   • Neutrophil % 09/04/2019 72.9  42.7 - 76.0 % Final   • Lymphocyte % 09/04/2019 11.4* 19.6 - 45.3 % Final   • Monocyte % 09/04/2019 9.0  5.0 - 12.0 % Final   • Eosinophil % 09/04/2019 6.1  0.3 - 6.2 % Final   • Basophil % 09/04/2019 0.6  0.0 - 1.5 % Final   • Neutrophils, Absolute 09/04/2019 3.40  1.70 - 7.00 10*3/mm3 Final   • Lymphocytes, Absolute 09/04/2019 0.50* 0.70 - 3.10 10*3/mm3 Final   • Monocytes, Absolute 09/04/2019 0.40  0.10 - 0.90 10*3/mm3 Final   • Eosinophils, Absolute 09/04/2019 0.30  0.00 - 0.40 10*3/mm3 Final   • Basophils, Absolute 09/04/2019 0.00  0.00 - 0.20 10*3/mm3 Final   • nRBC 09/04/2019 0.0  0.0 - 0.2 /100 WBC Final         Assessment/Plan   Age-appropriate Screening Schedule:  Refer to the list below for future screening recommendations based on patient's age, sex and/or medical conditions.      Health Maintenance   Topic Date Due   • TDAP/TD VACCINES (1 - Tdap) 07/25/1951   • ZOSTER VACCINE (1 of 2) 07/25/1992   • URINE MICROALBUMIN  11/20/2018   • DIABETIC EYE EXAM  09/03/2019   • HEMOGLOBIN A1C  03/04/2020   • INFLUENZA VACCINE  08/01/2020   • LIPID PANEL  09/04/2020       Medicare Risks and Personalized Health Plan:  Advance Directive Discussion  Diabetic Lab Screening       CMS-Preventive Services Quick Reference  Medicare Preventive Services Addressed:  Annual Wellness Visit (AWV)  Diabetes Self-Management Training (DSMT)     Advance Care Planning:  ACP discussion was held with the patient during this visit. Patient has an advance directive (not in EMR), copy requested.    Diagnoses and all orders for this visit:    1. Wellness examination (Primary)  -     Hemoglobin A1c  -     Lipid Panel  -     Comprehensive Metabolic Panel  -     TSH  -     MicroAlbumin, Urine, Random - Urine, Clean Catch    2. Disorder associated with type 2 diabetes mellitus (CMS/HCC)  -     Hemoglobin  A1c  -     Lipid Panel  -     Comprehensive Metabolic Panel  -     TSH  -     MicroAlbumin, Urine, Random - Urine, Clean Catch    3. Hyperlipidemia, unspecified hyperlipidemia type  -     Lipid Panel  -     Comprehensive Metabolic Panel    4. Hypothyroidism, unspecified type  -     TSH        An After Visit Summary and PPPS with all of these plans were given to the patient.      Follow Up:  Return in about 1 year (around 7/27/2021) for Medicare Wellness.

## 2020-07-28 LAB — ALBUMIN UR-MCNC: <1.2 MG/DL

## 2020-08-05 DIAGNOSIS — G89.29 CHRONIC BILATERAL LOW BACK PAIN WITHOUT SCIATICA: ICD-10-CM

## 2020-08-05 DIAGNOSIS — M54.50 CHRONIC BILATERAL LOW BACK PAIN WITHOUT SCIATICA: ICD-10-CM

## 2020-08-05 RX ORDER — TRAMADOL HYDROCHLORIDE 50 MG/1
50 TABLET ORAL EVERY 8 HOURS PRN
Qty: 90 TABLET | Refills: 0 | Status: SHIPPED | OUTPATIENT
Start: 2020-08-05 | End: 2020-09-30

## 2020-09-30 ENCOUNTER — FLU SHOT (OUTPATIENT)
Dept: FAMILY MEDICINE CLINIC | Facility: CLINIC | Age: 80
End: 2020-09-30

## 2020-09-30 DIAGNOSIS — M54.50 CHRONIC BILATERAL LOW BACK PAIN WITHOUT SCIATICA: ICD-10-CM

## 2020-09-30 DIAGNOSIS — Z23 NEED FOR IMMUNIZATION AGAINST INFLUENZA: Primary | ICD-10-CM

## 2020-09-30 DIAGNOSIS — G89.29 CHRONIC BILATERAL LOW BACK PAIN WITHOUT SCIATICA: ICD-10-CM

## 2020-09-30 PROCEDURE — G0008 ADMIN INFLUENZA VIRUS VAC: HCPCS | Performed by: FAMILY MEDICINE

## 2020-09-30 PROCEDURE — 90694 VACC AIIV4 NO PRSRV 0.5ML IM: CPT | Performed by: FAMILY MEDICINE

## 2020-09-30 RX ORDER — TRAMADOL HYDROCHLORIDE 50 MG/1
50 TABLET ORAL EVERY 8 HOURS PRN
Qty: 90 TABLET | Refills: 0 | Status: SHIPPED | OUTPATIENT
Start: 2020-09-30 | End: 2020-10-05 | Stop reason: SDUPTHER

## 2020-10-05 DIAGNOSIS — G89.29 CHRONIC BILATERAL LOW BACK PAIN WITHOUT SCIATICA: ICD-10-CM

## 2020-10-05 DIAGNOSIS — M54.50 CHRONIC BILATERAL LOW BACK PAIN WITHOUT SCIATICA: ICD-10-CM

## 2020-10-05 RX ORDER — TRAMADOL HYDROCHLORIDE 50 MG/1
50 TABLET ORAL EVERY 8 HOURS PRN
Qty: 90 TABLET | Refills: 0 | Status: SHIPPED | OUTPATIENT
Start: 2020-10-05 | End: 2020-10-28

## 2020-10-05 RX ORDER — LOSARTAN POTASSIUM 50 MG/1
50 TABLET ORAL DAILY
Qty: 90 TABLET | Refills: 3 | Status: SHIPPED | OUTPATIENT
Start: 2020-10-05 | End: 2020-12-30 | Stop reason: SDUPTHER

## 2020-10-05 RX ORDER — SIMVASTATIN 80 MG
80 TABLET ORAL DAILY
Qty: 90 TABLET | Refills: 3 | Status: SHIPPED | OUTPATIENT
Start: 2020-10-05 | End: 2020-12-30 | Stop reason: SDUPTHER

## 2020-10-05 RX ORDER — LEVOTHYROXINE SODIUM 0.03 MG/1
25 TABLET ORAL DAILY
Qty: 90 TABLET | Refills: 0 | Status: SHIPPED | OUTPATIENT
Start: 2020-10-05 | End: 2020-12-30 | Stop reason: SDUPTHER

## 2020-10-14 ENCOUNTER — OFFICE (AMBULATORY)
Dept: URBAN - METROPOLITAN AREA PATHOLOGY 4 | Facility: PATHOLOGY | Age: 80
End: 2020-10-14
Payer: COMMERCIAL

## 2020-10-14 ENCOUNTER — OFFICE (AMBULATORY)
Dept: URBAN - METROPOLITAN AREA PATHOLOGY 4 | Facility: PATHOLOGY | Age: 80
End: 2020-10-14
Payer: MEDICARE

## 2020-10-14 ENCOUNTER — ON CAMPUS - OUTPATIENT (AMBULATORY)
Dept: URBAN - METROPOLITAN AREA HOSPITAL 2 | Facility: HOSPITAL | Age: 80
End: 2020-10-14
Payer: MEDICARE

## 2020-10-14 VITALS
HEART RATE: 72 BPM | DIASTOLIC BLOOD PRESSURE: 63 MMHG | HEART RATE: 73 BPM | HEART RATE: 80 BPM | RESPIRATION RATE: 16 BRPM | HEIGHT: 65 IN | SYSTOLIC BLOOD PRESSURE: 102 MMHG | SYSTOLIC BLOOD PRESSURE: 111 MMHG | DIASTOLIC BLOOD PRESSURE: 58 MMHG | DIASTOLIC BLOOD PRESSURE: 60 MMHG | DIASTOLIC BLOOD PRESSURE: 85 MMHG | HEART RATE: 65 BPM | OXYGEN SATURATION: 96 % | SYSTOLIC BLOOD PRESSURE: 101 MMHG | HEART RATE: 77 BPM | SYSTOLIC BLOOD PRESSURE: 157 MMHG | SYSTOLIC BLOOD PRESSURE: 122 MMHG | DIASTOLIC BLOOD PRESSURE: 64 MMHG | OXYGEN SATURATION: 99 % | HEART RATE: 71 BPM | RESPIRATION RATE: 18 BRPM | SYSTOLIC BLOOD PRESSURE: 113 MMHG | SYSTOLIC BLOOD PRESSURE: 129 MMHG | HEART RATE: 70 BPM | DIASTOLIC BLOOD PRESSURE: 83 MMHG | WEIGHT: 182.2 LBS | TEMPERATURE: 97.4 F | SYSTOLIC BLOOD PRESSURE: 138 MMHG | OXYGEN SATURATION: 100 % | DIASTOLIC BLOOD PRESSURE: 76 MMHG

## 2020-10-14 DIAGNOSIS — K64.2 THIRD DEGREE HEMORRHOIDS: ICD-10-CM

## 2020-10-14 DIAGNOSIS — D12.5 BENIGN NEOPLASM OF SIGMOID COLON: ICD-10-CM

## 2020-10-14 DIAGNOSIS — D12.2 BENIGN NEOPLASM OF ASCENDING COLON: ICD-10-CM

## 2020-10-14 DIAGNOSIS — K51.40 INFLAMMATORY POLYPS OF COLON WITHOUT COMPLICATIONS: ICD-10-CM

## 2020-10-14 DIAGNOSIS — K57.30 DIVERTICULOSIS OF LARGE INTESTINE WITHOUT PERFORATION OR ABS: ICD-10-CM

## 2020-10-14 DIAGNOSIS — Z86.010 PERSONAL HISTORY OF COLONIC POLYPS: ICD-10-CM

## 2020-10-14 PROBLEM — K63.5 POLYP OF COLON: Status: ACTIVE | Noted: 2020-10-14

## 2020-10-14 LAB
GI HISTOLOGY: A. UNSPECIFIED: (no result)
GI HISTOLOGY: B. UNSPECIFIED: (no result)
GI HISTOLOGY: C. UNSPECIFIED: (no result)
GI HISTOLOGY: PDF REPORT: (no result)

## 2020-10-14 PROCEDURE — 88305 TISSUE EXAM BY PATHOLOGIST: CPT | Mod: 26 | Performed by: INTERNAL MEDICINE

## 2020-10-14 PROCEDURE — 45380 COLONOSCOPY AND BIOPSY: CPT | Mod: PT | Performed by: INTERNAL MEDICINE

## 2020-10-28 DIAGNOSIS — M54.50 CHRONIC BILATERAL LOW BACK PAIN WITHOUT SCIATICA: ICD-10-CM

## 2020-10-28 DIAGNOSIS — G89.29 CHRONIC BILATERAL LOW BACK PAIN WITHOUT SCIATICA: ICD-10-CM

## 2020-10-28 RX ORDER — TRAMADOL HYDROCHLORIDE 50 MG/1
50 TABLET ORAL EVERY 8 HOURS PRN
Qty: 90 TABLET | Refills: 0 | Status: SHIPPED | OUTPATIENT
Start: 2020-10-28 | End: 2020-11-28

## 2020-11-27 DIAGNOSIS — G89.29 CHRONIC BILATERAL LOW BACK PAIN WITHOUT SCIATICA: ICD-10-CM

## 2020-11-27 DIAGNOSIS — M54.50 CHRONIC BILATERAL LOW BACK PAIN WITHOUT SCIATICA: ICD-10-CM

## 2020-11-28 RX ORDER — TRAMADOL HYDROCHLORIDE 50 MG/1
50 TABLET ORAL EVERY 8 HOURS PRN
Qty: 90 TABLET | Refills: 0 | Status: SHIPPED | OUTPATIENT
Start: 2020-11-28 | End: 2020-12-30 | Stop reason: SDUPTHER

## 2020-12-30 DIAGNOSIS — M54.50 CHRONIC BILATERAL LOW BACK PAIN WITHOUT SCIATICA: ICD-10-CM

## 2020-12-30 DIAGNOSIS — G89.29 CHRONIC BILATERAL LOW BACK PAIN WITHOUT SCIATICA: ICD-10-CM

## 2020-12-30 RX ORDER — SIMVASTATIN 80 MG
80 TABLET ORAL DAILY
Qty: 90 TABLET | Refills: 3 | Status: SHIPPED | OUTPATIENT
Start: 2020-12-30 | End: 2022-03-29 | Stop reason: SDUPTHER

## 2020-12-30 RX ORDER — LOSARTAN POTASSIUM 50 MG/1
50 TABLET ORAL DAILY
Qty: 90 TABLET | Refills: 3 | Status: SHIPPED | OUTPATIENT
Start: 2020-12-30 | End: 2021-12-27

## 2020-12-30 RX ORDER — TRAMADOL HYDROCHLORIDE 50 MG/1
50 TABLET ORAL EVERY 8 HOURS PRN
Qty: 90 TABLET | Refills: 0 | Status: SHIPPED | OUTPATIENT
Start: 2020-12-30 | End: 2021-02-02

## 2020-12-30 RX ORDER — LEVOTHYROXINE SODIUM 0.03 MG/1
25 TABLET ORAL DAILY
Qty: 90 TABLET | Refills: 1 | Status: SHIPPED | OUTPATIENT
Start: 2020-12-30 | End: 2021-06-28

## 2020-12-30 NOTE — TELEPHONE ENCOUNTER
He called said they are changing pharmacy to [facility] in [place].  Needs all his prescriptions sent there.

## 2021-01-19 ENCOUNTER — TELEPHONE (OUTPATIENT)
Dept: FAMILY MEDICINE CLINIC | Facility: CLINIC | Age: 81
End: 2021-01-19

## 2021-01-19 DIAGNOSIS — M54.16 LUMBAR RADICULOPATHY: ICD-10-CM

## 2021-02-01 DIAGNOSIS — G89.29 CHRONIC BILATERAL LOW BACK PAIN WITHOUT SCIATICA: ICD-10-CM

## 2021-02-01 DIAGNOSIS — M54.50 CHRONIC BILATERAL LOW BACK PAIN WITHOUT SCIATICA: ICD-10-CM

## 2021-02-02 RX ORDER — TRAMADOL HYDROCHLORIDE 50 MG/1
TABLET ORAL
Qty: 90 TABLET | Refills: 0 | Status: SHIPPED | OUTPATIENT
Start: 2021-02-02 | End: 2021-03-19 | Stop reason: SDUPTHER

## 2021-03-04 ENCOUNTER — TELEPHONE (OUTPATIENT)
Dept: FAMILY MEDICINE CLINIC | Facility: CLINIC | Age: 81
End: 2021-03-04

## 2021-03-04 DIAGNOSIS — E03.9 HYPOTHYROIDISM, UNSPECIFIED TYPE: ICD-10-CM

## 2021-03-04 DIAGNOSIS — E78.5 HYPERLIPIDEMIA, UNSPECIFIED HYPERLIPIDEMIA TYPE: Primary | ICD-10-CM

## 2021-03-04 DIAGNOSIS — E11.8 DISORDER ASSOCIATED WITH TYPE 2 DIABETES MELLITUS (HCC): ICD-10-CM

## 2021-03-04 NOTE — TELEPHONE ENCOUNTER
Caller: Mark Marie    Relationship: Self    Best call back number: [phone]    What orders are you requesting (i.e. lab or imaging): A1C AND OTHER LABS WANTED BY DR. WISE WITH THE EXCEPTION OF PSA    In what timeframe would the patient need to come in: 3/8    NO ACTIVE ORDERS

## 2021-03-08 ENCOUNTER — LAB (OUTPATIENT)
Dept: FAMILY MEDICINE CLINIC | Facility: CLINIC | Age: 81
End: 2021-03-08

## 2021-03-08 LAB
ALBUMIN SERPL-MCNC: 4.1 G/DL (ref 3.5–5.2)
ALBUMIN/GLOB SERPL: 1.6 G/DL
ALP SERPL-CCNC: 59 U/L (ref 39–117)
ALT SERPL W P-5'-P-CCNC: 10 U/L (ref 1–41)
ANION GAP SERPL CALCULATED.3IONS-SCNC: 9.8 MMOL/L (ref 5–15)
AST SERPL-CCNC: 15 U/L (ref 1–40)
BILIRUB SERPL-MCNC: 0.5 MG/DL (ref 0–1.2)
BUN SERPL-MCNC: 16 MG/DL (ref 8–23)
BUN/CREAT SERPL: 18.4 (ref 7–25)
CALCIUM SPEC-SCNC: 9.3 MG/DL (ref 8.6–10.5)
CHLORIDE SERPL-SCNC: 104 MMOL/L (ref 98–107)
CHOLEST SERPL-MCNC: 163 MG/DL (ref 0–200)
CO2 SERPL-SCNC: 26.2 MMOL/L (ref 22–29)
CREAT SERPL-MCNC: 0.87 MG/DL (ref 0.76–1.27)
GFR SERPL CREATININE-BSD FRML MDRD: 85 ML/MIN/1.73
GLOBULIN UR ELPH-MCNC: 2.6 GM/DL
GLUCOSE SERPL-MCNC: 104 MG/DL (ref 65–99)
HBA1C MFR BLD: 5.9 % (ref 3.5–5.6)
HDLC SERPL-MCNC: 62 MG/DL (ref 40–60)
LDLC SERPL CALC-MCNC: 86 MG/DL (ref 0–100)
LDLC/HDLC SERPL: 1.36 {RATIO}
POTASSIUM SERPL-SCNC: 4.3 MMOL/L (ref 3.5–5.2)
PROT SERPL-MCNC: 6.7 G/DL (ref 6–8.5)
SODIUM SERPL-SCNC: 140 MMOL/L (ref 136–145)
TRIGL SERPL-MCNC: 82 MG/DL (ref 0–150)
TSH SERPL DL<=0.05 MIU/L-ACNC: 3.39 UIU/ML (ref 0.27–4.2)
VLDLC SERPL-MCNC: 15 MG/DL (ref 5–40)

## 2021-03-08 PROCEDURE — 80061 LIPID PANEL: CPT | Performed by: FAMILY MEDICINE

## 2021-03-08 PROCEDURE — 84443 ASSAY THYROID STIM HORMONE: CPT | Performed by: FAMILY MEDICINE

## 2021-03-08 PROCEDURE — 36415 COLL VENOUS BLD VENIPUNCTURE: CPT | Performed by: FAMILY MEDICINE

## 2021-03-08 PROCEDURE — 83036 HEMOGLOBIN GLYCOSYLATED A1C: CPT | Performed by: FAMILY MEDICINE

## 2021-03-08 PROCEDURE — 80053 COMPREHEN METABOLIC PANEL: CPT | Performed by: FAMILY MEDICINE

## 2021-03-19 ENCOUNTER — OFFICE VISIT (OUTPATIENT)
Dept: FAMILY MEDICINE CLINIC | Facility: CLINIC | Age: 81
End: 2021-03-19

## 2021-03-19 VITALS
TEMPERATURE: 97.3 F | OXYGEN SATURATION: 97 % | WEIGHT: 193 LBS | DIASTOLIC BLOOD PRESSURE: 68 MMHG | HEART RATE: 88 BPM | BODY MASS INDEX: 33.39 KG/M2 | SYSTOLIC BLOOD PRESSURE: 142 MMHG

## 2021-03-19 DIAGNOSIS — I10 HYPERTENSION, UNSPECIFIED TYPE: ICD-10-CM

## 2021-03-19 DIAGNOSIS — R41.3 MEMORY CHANGE: ICD-10-CM

## 2021-03-19 DIAGNOSIS — M54.50 CHRONIC BILATERAL LOW BACK PAIN WITHOUT SCIATICA: ICD-10-CM

## 2021-03-19 DIAGNOSIS — G89.29 CHRONIC BILATERAL LOW BACK PAIN WITHOUT SCIATICA: ICD-10-CM

## 2021-03-19 DIAGNOSIS — E11.8 DISORDER ASSOCIATED WITH TYPE 2 DIABETES MELLITUS (HCC): ICD-10-CM

## 2021-03-19 DIAGNOSIS — E03.9 HYPOTHYROIDISM, UNSPECIFIED TYPE: ICD-10-CM

## 2021-03-19 DIAGNOSIS — E78.5 HYPERLIPIDEMIA, UNSPECIFIED HYPERLIPIDEMIA TYPE: Primary | ICD-10-CM

## 2021-03-19 PROCEDURE — 99214 OFFICE O/P EST MOD 30 MIN: CPT | Performed by: FAMILY MEDICINE

## 2021-03-19 RX ORDER — DONEPEZIL HYDROCHLORIDE 5 MG/1
5 TABLET, FILM COATED ORAL NIGHTLY
Qty: 90 TABLET | Refills: 1 | Status: SHIPPED | OUTPATIENT
Start: 2021-03-19 | End: 2021-09-24

## 2021-03-19 RX ORDER — TRAMADOL HYDROCHLORIDE 50 MG/1
50 TABLET ORAL EVERY 8 HOURS PRN
Qty: 90 TABLET | Refills: 0 | Status: SHIPPED | OUTPATIENT
Start: 2021-03-19 | End: 2021-05-26

## 2021-05-26 DIAGNOSIS — G89.29 CHRONIC BILATERAL LOW BACK PAIN WITHOUT SCIATICA: ICD-10-CM

## 2021-05-26 DIAGNOSIS — M54.50 CHRONIC BILATERAL LOW BACK PAIN WITHOUT SCIATICA: ICD-10-CM

## 2021-05-26 RX ORDER — TRAMADOL HYDROCHLORIDE 50 MG/1
TABLET ORAL
Qty: 90 TABLET | Refills: 0 | Status: SHIPPED | OUTPATIENT
Start: 2021-05-26 | End: 2021-06-28

## 2021-06-27 DIAGNOSIS — M54.50 CHRONIC BILATERAL LOW BACK PAIN WITHOUT SCIATICA: ICD-10-CM

## 2021-06-27 DIAGNOSIS — G89.29 CHRONIC BILATERAL LOW BACK PAIN WITHOUT SCIATICA: ICD-10-CM

## 2021-06-28 RX ORDER — LEVOTHYROXINE SODIUM 25 UG/1
TABLET ORAL
Qty: 90 TABLET | Refills: 1 | Status: SHIPPED | OUTPATIENT
Start: 2021-06-28 | End: 2021-12-27

## 2021-06-28 RX ORDER — TRAMADOL HYDROCHLORIDE 50 MG/1
TABLET ORAL
Qty: 90 TABLET | Refills: 0 | Status: SHIPPED | OUTPATIENT
Start: 2021-06-28 | End: 2021-09-24

## 2021-09-13 ENCOUNTER — TELEPHONE (OUTPATIENT)
Dept: FAMILY MEDICINE CLINIC | Facility: CLINIC | Age: 81
End: 2021-09-13

## 2021-09-13 DIAGNOSIS — E11.8 DISORDER ASSOCIATED WITH TYPE 2 DIABETES MELLITUS (HCC): ICD-10-CM

## 2021-09-13 DIAGNOSIS — I10 HYPERTENSION, UNSPECIFIED TYPE: ICD-10-CM

## 2021-09-13 DIAGNOSIS — E78.5 HYPERLIPIDEMIA, UNSPECIFIED HYPERLIPIDEMIA TYPE: Primary | ICD-10-CM

## 2021-09-13 DIAGNOSIS — E03.9 HYPOTHYROIDISM, UNSPECIFIED TYPE: ICD-10-CM

## 2021-09-20 ENCOUNTER — LAB (OUTPATIENT)
Dept: FAMILY MEDICINE CLINIC | Facility: CLINIC | Age: 81
End: 2021-09-20

## 2021-09-20 LAB
ALBUMIN SERPL-MCNC: 4.1 G/DL (ref 3.5–5.2)
ALBUMIN/GLOB SERPL: 1.8 G/DL
ALP SERPL-CCNC: 62 U/L (ref 39–117)
ALT SERPL W P-5'-P-CCNC: 9 U/L (ref 1–41)
ANION GAP SERPL CALCULATED.3IONS-SCNC: 7.9 MMOL/L (ref 5–15)
AST SERPL-CCNC: 13 U/L (ref 1–40)
BILIRUB SERPL-MCNC: 0.4 MG/DL (ref 0–1.2)
BUN SERPL-MCNC: 21 MG/DL (ref 8–23)
BUN/CREAT SERPL: 22.1 (ref 7–25)
CALCIUM SPEC-SCNC: 9 MG/DL (ref 8.6–10.5)
CHLORIDE SERPL-SCNC: 107 MMOL/L (ref 98–107)
CHOLEST SERPL-MCNC: 166 MG/DL (ref 0–200)
CO2 SERPL-SCNC: 26.1 MMOL/L (ref 22–29)
CREAT SERPL-MCNC: 0.95 MG/DL (ref 0.76–1.27)
GFR SERPL CREATININE-BSD FRML MDRD: 76 ML/MIN/1.73
GLOBULIN UR ELPH-MCNC: 2.3 GM/DL
GLUCOSE SERPL-MCNC: 118 MG/DL (ref 65–99)
HBA1C MFR BLD: 5.9 % (ref 3.5–5.6)
HDLC SERPL-MCNC: 57 MG/DL (ref 40–60)
LDLC SERPL CALC-MCNC: 95 MG/DL (ref 0–100)
LDLC/HDLC SERPL: 1.65 {RATIO}
POTASSIUM SERPL-SCNC: 4.6 MMOL/L (ref 3.5–5.2)
PROT SERPL-MCNC: 6.4 G/DL (ref 6–8.5)
SODIUM SERPL-SCNC: 141 MMOL/L (ref 136–145)
TRIGL SERPL-MCNC: 75 MG/DL (ref 0–150)
TSH SERPL DL<=0.05 MIU/L-ACNC: 3.06 UIU/ML (ref 0.27–4.2)
VLDLC SERPL-MCNC: 14 MG/DL (ref 5–40)

## 2021-09-20 PROCEDURE — 84443 ASSAY THYROID STIM HORMONE: CPT | Performed by: FAMILY MEDICINE

## 2021-09-20 PROCEDURE — 80061 LIPID PANEL: CPT | Performed by: FAMILY MEDICINE

## 2021-09-20 PROCEDURE — 36415 COLL VENOUS BLD VENIPUNCTURE: CPT | Performed by: FAMILY MEDICINE

## 2021-09-20 PROCEDURE — 80053 COMPREHEN METABOLIC PANEL: CPT | Performed by: FAMILY MEDICINE

## 2021-09-20 PROCEDURE — 83036 HEMOGLOBIN GLYCOSYLATED A1C: CPT | Performed by: FAMILY MEDICINE

## 2021-09-21 ENCOUNTER — OFFICE VISIT (OUTPATIENT)
Dept: FAMILY MEDICINE CLINIC | Facility: CLINIC | Age: 81
End: 2021-09-21

## 2021-09-21 VITALS
OXYGEN SATURATION: 96 % | BODY MASS INDEX: 32.33 KG/M2 | SYSTOLIC BLOOD PRESSURE: 147 MMHG | DIASTOLIC BLOOD PRESSURE: 75 MMHG | TEMPERATURE: 97.7 F | HEART RATE: 81 BPM | WEIGHT: 189.4 LBS | HEIGHT: 64 IN

## 2021-09-21 DIAGNOSIS — Z00.00 MEDICARE ANNUAL WELLNESS VISIT, SUBSEQUENT: Primary | ICD-10-CM

## 2021-09-21 PROCEDURE — G0439 PPPS, SUBSEQ VISIT: HCPCS | Performed by: FAMILY MEDICINE

## 2021-09-21 NOTE — PATIENT INSTRUCTIONS
Medicare Wellness  Personal Prevention Plan of Service     Date of Office Visit:  2021  Encounter Provider:  Diana Linares MD  Place of Service:  [facility]  Patient Name: Mark Marie  :  1940    As part of the Medicare Wellness portion of your visit today, we are providing you with this personalized preventive plan of services (PPPS). This plan is based upon recommendations of the [place] Preventive Services Task Force (USPSTF) and the Advisory Committee on Immunization Practices (ACIP).    This lists the preventive care services that should be considered, and provides dates of when you are due. Items listed as completed are up-to-date and do not require any further intervention.    Health Maintenance   Topic Date Due   • TDAP/TD VACCINES (1 - Tdap) Never done   • ZOSTER VACCINE (1 of 2) Never done   • Pneumococcal Vaccine 65+ (1 of 2 - PPSV23) 2018   • HEPATITIS C SCREENING  Never done   • DIABETIC EYE EXAM  2019   • URINE MICROALBUMIN  2021   • INFLUENZA VACCINE  10/01/2021   • HEMOGLOBIN A1C  2022   • LIPID PANEL  2022   • ANNUAL WELLNESS VISIT  2022   • COVID-19 Vaccine  Completed       No orders of the defined types were placed in this encounter.      Return in about 1 year (around 2022) for Medicare Wellness.

## 2021-09-21 NOTE — PROGRESS NOTES
The ABCs of the Annual Wellness Visit  Subsequent Medicare Wellness Visit    Chief Complaint   Patient presents with   • Medicare Wellness-subsequent      Subjective    History of Present Illness:  Mark Marie is a 81 y.o. male who presents for a Subsequent Medicare Wellness Visit.    The following portions of the patient's history were reviewed and   updated as appropriate: allergies, current medications, past family history, past medical history, past social history, past surgical history and problem list.    Compared to one year ago, the patient feels his physical   health is the same.    Compared to one year ago, the patient feels his mental   health is the same.    Recent Hospitalizations:  He was not admitted to the hospital during the last year.       Current Medical Providers:  Patient Care Team:  Diana Linares MD as PCP - General (Family Medicine)  Abhijit Jones MD as Consulting Physician (Urology)    Outpatient Medications Prior to Visit   Medication Sig Dispense Refill   • donepezil (Aricept) 5 MG tablet Take 1 tablet by mouth Every Night. 90 tablet 1   • Euthyrox 25 MCG tablet Take 1 tablet by mouth once daily 90 tablet 1   • furosemide (LASIX) 40 MG tablet TAKE 1 TABLET BY MOUTH DAILY 90 tablet 3   • losartan (COZAAR) 50 MG tablet Take 1 tablet by mouth Daily. 90 tablet 3   • metFORMIN (GLUCOPHAGE) 500 MG tablet Take 1 tablet by mouth 2 (Two) Times a Day. 180 tablet 3   • potassium chloride (K-DUR,KLOR-CON) 20 MEQ CR tablet TAKE 1 TABLET BY MOUTH DAILY 90 tablet 3   • simvastatin (ZOCOR) 80 MG tablet Take 1 tablet by mouth Daily. 90 tablet 3   • tamsulosin (FLOMAX) 0.4 MG capsule 24 hr capsule      • traMADol (ULTRAM) 50 MG tablet TAKE 1 TABLET BY MOUTH EVERY 8 HOURS AS NEEDED FOR MODERATE PAIN 90 tablet 0   • HYDROcodone-acetaminophen (NORCO)  MG per tablet TAKE 1 TABLET BY MOUTH EVERY 6 (SIX) HOURS AS NEEDED FOR MODERATE PAIN . 28 tablet 0     No facility-administered medications prior  "to visit.       Opioid medication/s are on active medication list.  and I have evaluated his active treatment plan and pain score trends (see table).  There were no vitals filed for this visit.  I have reviewed the chart for potential of high risk medication and harmful drug interactions in the elderly.            Aspirin is not on active medication list.  Aspirin use is indicated based on review of current medical condition/s. Pros and cons of this therapy have been discussed with this patient. Benefits of this medication outweigh potential harm.  Patient has been instructed to start taking this medication.. He declines taking ASA due to giving platelets.    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  ACP discussion was held with the patient during this visit. Patient does not have an advance directive, information provided.          Objective    Vitals:    09/21/21 1004   BP: 147/75   Pulse: 81   Temp: 97.7 °F (36.5 °C)   TempSrc: Temporal   SpO2: 96%   Weight: 85.9 kg (189 lb 6.4 oz)   Height: 162.6 cm (64\")     Body mass index is 32.51 kg/m².  BMI has not been calculated during today's encounter.     Does the patient have evidence of cognitive impairment? No    Physical Exam  Constitutional:       General: He is not in acute distress.     Appearance: He is well-developed.   HENT:      Head: Normocephalic.   Eyes:      General: Lids are normal.      Conjunctiva/sclera: Conjunctivae normal.   Neck:      Thyroid: No thyroid mass or thyromegaly.      Trachea: Trachea normal.   Cardiovascular:      Rate and Rhythm: Normal rate and regular rhythm.      Heart sounds: Normal heart " sounds.   Pulmonary:      Effort: Pulmonary effort is normal.      Breath sounds: Normal breath sounds.   Musculoskeletal:      Cervical back: Normal range of motion.   Lymphadenopathy:      Cervical: No cervical adenopathy.   Skin:     General: Skin is warm and dry.   Neurological:      Mental Status: He is alert and oriented to person, place, and time.   Psychiatric:         Attention and Perception: He is attentive.         Mood and Affect: Mood normal.         Speech: Speech normal.         Behavior: Behavior normal.       Lab Results   Component Value Date    TRIG 75 09/20/2021    HDL 57 09/20/2021    LDL 95 09/20/2021    VLDL 14 09/20/2021    HGBA1C 5.9 (H) 09/20/2021            HEALTH RISK ASSESSMENT    Smoking Status:  Social History     Tobacco Use   Smoking Status Never Smoker   Smokeless Tobacco Never Used     Alcohol Consumption:  Social History     Substance and Sexual Activity   Alcohol Use Yes    Comment: rarely;  caffeine 1c qd     Fall Risk Screen:    STEADI Fall Risk Assessment was completed, and patient is at LOW risk for falls.Assessment completed on:3/19/2021    Depression Screening:  PHQ-2/PHQ-9 Depression Screening 3/19/2021   Little interest or pleasure in doing things 0   Feeling down, depressed, or hopeless 0   Total Score 0       Health Habits and Functional and Cognitive Screening:  Functional & Cognitive Status 9/21/2021   Do you have difficulty preparing food and eating? No   Do you have difficulty bathing yourself, getting dressed or grooming yourself? No   Do you have difficulty using the toilet? No   Do you have difficulty moving around from place to place? No   Do you have trouble with steps or getting out of a bed or a chair? No   Current Diet Diabetic Diet   Dental Exam Not up to date   Eye Exam Not up to date   Exercise (times per week) 5 times per week   Current Exercises Include Gardening   Current Exercise Activities Include -   Do you need help using the phone?  No   Are you  deaf or do you have serious difficulty hearing?  No   Do you need help with transportation? No   Do you need help shopping? No   Do you need help preparing meals?  No   Do you need help with housework?  No   Do you need help with laundry? No   Do you need help taking your medications? No   Do you need help managing money? No   Do you ever drive or ride in a car without wearing a seat belt? No   Have you felt unusual stress, anger or loneliness in the last month? No   Who do you live with? Spouse   If you need help, do you have trouble finding someone available to you? No   Do you have difficulty concentrating, remembering or making decisions? No       Age-appropriate Screening Schedule:  Refer to the list below for future screening recommendations based on patient's age, sex and/or medical conditions. Orders for these recommended tests are listed in the plan section. The patient has been provided with a written plan.    Health Maintenance   Topic Date Due   • TDAP/TD VACCINES (1 - Tdap) Never done   • ZOSTER VACCINE (1 of 2) Never done   • DIABETIC EYE EXAM  09/03/2019   • URINE MICROALBUMIN  07/27/2021   • INFLUENZA VACCINE  10/01/2021   • HEMOGLOBIN A1C  03/20/2022   • LIPID PANEL  09/20/2022              Assessment/Plan   CMS Preventative Services Quick Reference  Risk Factors Identified During Encounter  Glaucoma or Family History of Glaucoma  The above risks/problems have been discussed with the patient.  Follow up actions/plans if indicated are seen below in the Assessment/Plan Section.  Pertinent information has been shared with the patient in the After Visit Summary.    Diagnoses and all orders for this visit:    1. Medicare annual wellness visit, subsequent (Primary)        Follow Up:   Return in about 1 year (around 9/21/2022) for Medicare Wellness.     An After Visit Summary and PPPS were made available to the patient.

## 2021-09-22 DIAGNOSIS — G89.29 CHRONIC BILATERAL LOW BACK PAIN WITHOUT SCIATICA: ICD-10-CM

## 2021-09-22 DIAGNOSIS — M54.50 CHRONIC BILATERAL LOW BACK PAIN WITHOUT SCIATICA: ICD-10-CM

## 2021-09-22 DIAGNOSIS — R41.3 MEMORY CHANGE: ICD-10-CM

## 2021-09-24 ENCOUNTER — TELEPHONE (OUTPATIENT)
Dept: FAMILY MEDICINE CLINIC | Facility: CLINIC | Age: 81
End: 2021-09-24

## 2021-09-24 DIAGNOSIS — U07.1 COVID-19 VIRUS INFECTION: Primary | ICD-10-CM

## 2021-09-24 RX ORDER — DONEPEZIL HYDROCHLORIDE 5 MG/1
TABLET, FILM COATED ORAL
Qty: 90 TABLET | Refills: 0 | Status: SHIPPED | OUTPATIENT
Start: 2021-09-24 | End: 2021-12-27

## 2021-09-24 RX ORDER — TRAMADOL HYDROCHLORIDE 50 MG/1
TABLET ORAL
Qty: 90 TABLET | Refills: 0 | Status: SHIPPED | OUTPATIENT
Start: 2021-09-24 | End: 2021-12-28

## 2021-09-24 NOTE — TELEPHONE ENCOUNTER
Caller: Mark Marie    Relationship to patient: Self    Best call back number:[phone]   Date of exposure:9/23/21  Date of positive COVID19 test: 9/23/21    Date if possible COVID19 exposure: 9/23/21    COVID19 symptoms: 9/23/21    Date of initial quarantine: 9/23/21    Additional information or concerns: CONGESTION, FATIGUE,  COUGH, MENTIONED SOMETHING REGARDING INFUSION

## 2021-09-24 NOTE — TELEPHONE ENCOUNTER
I called and spoke with the patient.  He had a positive COVID test yesterday and today is feeling worse.  I faxed an order to [facility] of outpatient monoclonal antibody infusion for treatment.  Also ordering a chest x-ray.

## 2021-09-27 ENCOUNTER — HOSPITAL ENCOUNTER (OUTPATIENT)
Dept: INFUSION THERAPY | Facility: HOSPITAL | Age: 81
Discharge: HOME | End: 2021-09-27

## 2021-09-27 ENCOUNTER — HOSPITAL ENCOUNTER (OUTPATIENT)
Dept: GENERAL RADIOLOGY | Facility: HOSPITAL | Age: 81
Discharge: HOME | End: 2021-09-27

## 2021-09-27 VITALS
RESPIRATION RATE: 14 BRPM | TEMPERATURE: 97.6 F | SYSTOLIC BLOOD PRESSURE: 121 MMHG | OXYGEN SATURATION: 96 % | HEART RATE: 80 BPM | DIASTOLIC BLOOD PRESSURE: 66 MMHG

## 2021-09-27 DIAGNOSIS — U07.1 CLINICAL DIAGNOSIS OF COVID-19: Primary | ICD-10-CM

## 2021-09-27 PROCEDURE — 71046 X-RAY EXAM CHEST 2 VIEWS: CPT

## 2021-09-27 PROCEDURE — 25010000006 INJECTION, CASIRIVIMAB AND IMDEVIMAB, 1200 MG: Performed by: FAMILY MEDICINE

## 2021-09-27 PROCEDURE — M0243 CASIRIVI AND IMDEVI INFUSION: HCPCS | Performed by: FAMILY MEDICINE

## 2021-09-27 PROCEDURE — 96365 THER/PROPH/DIAG IV INF INIT: CPT

## 2021-09-27 RX ORDER — DIPHENHYDRAMINE HYDROCHLORIDE 50 MG/ML
50 INJECTION INTRAMUSCULAR; INTRAVENOUS ONCE AS NEEDED
Status: CANCELLED | OUTPATIENT
Start: 2021-09-27

## 2021-09-27 RX ORDER — DIPHENHYDRAMINE HCL 25 MG
50 CAPSULE ORAL ONCE AS NEEDED
Status: DISCONTINUED | OUTPATIENT
Start: 2021-09-27 | End: 2021-09-29 | Stop reason: HOSPADM

## 2021-09-27 RX ORDER — DIPHENHYDRAMINE HCL 25 MG
50 TABLET ORAL ONCE AS NEEDED
Status: CANCELLED | OUTPATIENT
Start: 2021-09-27

## 2021-09-27 RX ORDER — SODIUM CHLORIDE 9 MG/ML
30 INJECTION, SOLUTION INTRAVENOUS ONCE
Status: CANCELLED | OUTPATIENT
Start: 2021-09-27

## 2021-09-27 RX ORDER — METHYLPREDNISOLONE SODIUM SUCCINATE 125 MG/2ML
125 INJECTION, POWDER, LYOPHILIZED, FOR SOLUTION INTRAMUSCULAR; INTRAVENOUS AS NEEDED
Status: DISCONTINUED | OUTPATIENT
Start: 2021-09-27 | End: 2021-09-29 | Stop reason: HOSPADM

## 2021-09-27 RX ORDER — METHYLPREDNISOLONE SODIUM SUCCINATE 125 MG/2ML
125 INJECTION, POWDER, LYOPHILIZED, FOR SOLUTION INTRAMUSCULAR; INTRAVENOUS AS NEEDED
Status: CANCELLED | OUTPATIENT
Start: 2021-09-27

## 2021-09-27 RX ORDER — DIPHENHYDRAMINE HCL 25 MG
50 CAPSULE ORAL ONCE AS NEEDED
Status: CANCELLED | OUTPATIENT
Start: 2021-09-27

## 2021-09-27 RX ORDER — EPINEPHRINE 1 MG/ML
0.3 INJECTION, SOLUTION INTRAMUSCULAR; SUBCUTANEOUS AS NEEDED
Status: CANCELLED | OUTPATIENT
Start: 2021-09-27

## 2021-09-27 RX ORDER — EPINEPHRINE 1 MG/ML
0.3 INJECTION, SOLUTION, CONCENTRATE INTRAVENOUS AS NEEDED
Status: CANCELLED | OUTPATIENT
Start: 2021-09-27

## 2021-09-27 RX ORDER — EPINEPHRINE 1 MG/ML
0.3 INJECTION, SOLUTION, CONCENTRATE INTRAVENOUS AS NEEDED
Status: DISCONTINUED | OUTPATIENT
Start: 2021-09-27 | End: 2021-09-29 | Stop reason: HOSPADM

## 2021-09-27 RX ORDER — DIPHENHYDRAMINE HYDROCHLORIDE 50 MG/ML
50 INJECTION INTRAMUSCULAR; INTRAVENOUS ONCE AS NEEDED
Status: DISCONTINUED | OUTPATIENT
Start: 2021-09-27 | End: 2021-09-29 | Stop reason: HOSPADM

## 2021-09-27 RX ORDER — SODIUM CHLORIDE 9 MG/ML
30 INJECTION, SOLUTION INTRAVENOUS ONCE
Status: COMPLETED | OUTPATIENT
Start: 2021-09-27 | End: 2021-09-27

## 2021-09-27 RX ADMIN — SODIUM CHLORIDE 30 ML: 900 INJECTION, SOLUTION INTRAVENOUS at 13:38

## 2021-09-27 RX ADMIN — CASIRIVIMAB AND IMDEVIMAB: 600; 600 INJECTION, SOLUTION, CONCENTRATE INTRAVENOUS at 13:16

## 2021-10-04 ENCOUNTER — TELEPHONE (OUTPATIENT)
Dept: INFUSION THERAPY | Facility: HOSPITAL | Age: 81
End: 2021-10-04

## 2021-10-12 ENCOUNTER — FLU SHOT (OUTPATIENT)
Dept: FAMILY MEDICINE CLINIC | Facility: CLINIC | Age: 81
End: 2021-10-12

## 2021-10-12 VITALS — TEMPERATURE: 97.4 F

## 2021-10-12 DIAGNOSIS — Z23 NEED FOR VACCINATION: Primary | ICD-10-CM

## 2021-10-12 PROCEDURE — G0008 ADMIN INFLUENZA VIRUS VAC: HCPCS | Performed by: FAMILY MEDICINE

## 2021-10-12 PROCEDURE — 90662 IIV NO PRSV INCREASED AG IM: CPT | Performed by: FAMILY MEDICINE

## 2021-12-27 DIAGNOSIS — R41.3 MEMORY CHANGE: ICD-10-CM

## 2021-12-27 RX ORDER — LOSARTAN POTASSIUM 50 MG/1
TABLET ORAL
Qty: 90 TABLET | Refills: 0 | Status: SHIPPED | OUTPATIENT
Start: 2021-12-27 | End: 2022-03-29 | Stop reason: SDUPTHER

## 2021-12-27 RX ORDER — DONEPEZIL HYDROCHLORIDE 5 MG/1
TABLET, FILM COATED ORAL
Qty: 90 TABLET | Refills: 0 | Status: SHIPPED | OUTPATIENT
Start: 2021-12-27 | End: 2022-03-29 | Stop reason: SDUPTHER

## 2021-12-27 RX ORDER — LEVOTHYROXINE SODIUM 25 UG/1
TABLET ORAL
Qty: 90 TABLET | Refills: 0 | Status: SHIPPED | OUTPATIENT
Start: 2021-12-27 | End: 2022-07-13

## 2021-12-28 DIAGNOSIS — G89.29 CHRONIC BILATERAL LOW BACK PAIN WITHOUT SCIATICA: ICD-10-CM

## 2021-12-28 DIAGNOSIS — M54.50 CHRONIC BILATERAL LOW BACK PAIN WITHOUT SCIATICA: ICD-10-CM

## 2021-12-28 RX ORDER — TRAMADOL HYDROCHLORIDE 50 MG/1
TABLET ORAL
Qty: 90 TABLET | Refills: 0 | Status: SHIPPED | OUTPATIENT
Start: 2021-12-28 | End: 2022-02-01 | Stop reason: SDUPTHER

## 2021-12-30 DIAGNOSIS — G89.29 CHRONIC BILATERAL LOW BACK PAIN WITHOUT SCIATICA: ICD-10-CM

## 2021-12-30 DIAGNOSIS — M54.50 CHRONIC BILATERAL LOW BACK PAIN WITHOUT SCIATICA: ICD-10-CM

## 2022-01-01 RX ORDER — TRAMADOL HYDROCHLORIDE 50 MG/1
TABLET ORAL
Qty: 90 TABLET | Refills: 0 | OUTPATIENT
Start: 2022-01-01

## 2022-01-19 ENCOUNTER — OFFICE VISIT (OUTPATIENT)
Dept: FAMILY MEDICINE CLINIC | Facility: CLINIC | Age: 82
End: 2022-01-19

## 2022-01-19 VITALS
OXYGEN SATURATION: 96 % | HEART RATE: 87 BPM | BODY MASS INDEX: 32.27 KG/M2 | SYSTOLIC BLOOD PRESSURE: 146 MMHG | DIASTOLIC BLOOD PRESSURE: 87 MMHG | WEIGHT: 188 LBS | TEMPERATURE: 98.6 F

## 2022-01-19 DIAGNOSIS — M54.16 LUMBAR RADICULOPATHY: Primary | ICD-10-CM

## 2022-01-19 PROCEDURE — 99213 OFFICE O/P EST LOW 20 MIN: CPT | Performed by: FAMILY MEDICINE

## 2022-01-19 RX ORDER — HYDROCODONE BITARTRATE AND ACETAMINOPHEN 5; 325 MG/1; MG/1
TABLET ORAL
COMMUNITY
Start: 2022-01-18

## 2022-01-26 DIAGNOSIS — M54.16 LUMBAR RADICULOPATHY: Primary | ICD-10-CM

## 2022-01-28 ENCOUNTER — TELEPHONE (OUTPATIENT)
Dept: FAMILY MEDICINE CLINIC | Facility: CLINIC | Age: 82
End: 2022-01-28

## 2022-02-01 DIAGNOSIS — M54.50 CHRONIC BILATERAL LOW BACK PAIN WITHOUT SCIATICA: ICD-10-CM

## 2022-02-01 DIAGNOSIS — G89.29 CHRONIC BILATERAL LOW BACK PAIN WITHOUT SCIATICA: ICD-10-CM

## 2022-02-01 RX ORDER — TRAMADOL HYDROCHLORIDE 50 MG/1
50 TABLET ORAL EVERY 6 HOURS PRN
Qty: 120 TABLET | Refills: 0 | Status: SHIPPED | OUTPATIENT
Start: 2022-02-01 | End: 2022-03-29 | Stop reason: SDUPTHER

## 2022-02-01 NOTE — TELEPHONE ENCOUNTER
Caller: Leonid Marietrace CHACON    Relationship: Self    Best call back number: [phone]     Requested Prescriptions:   Requested Prescriptions     Pending Prescriptions Disp Refills   • traMADol (ULTRAM) 50 MG tablet 90 tablet 0     Sig: Take 1 tablet by mouth Every 8 (Eight) Hours As Needed for Moderate Pain .        Pharmacy where request should be sent: [facility]ARMACY #[id number] [address] AT [place] [address] - [phone] [facility] [phone]      Additional details provided by patient: PATIENT IS REQUESTING THAT HIS PRESCRIPTION BE CHANGED FROM A QUANTITY OF 90 TO A QUANTITY , HE STATES HE HAS BEEN TAKING 4-6 TABLETS A DAY TO MANAGE HIS PAIN. HE DOES NOT SEE PAIN MANAGEMENT FOR ANOTHER WEEK AND NEEDS SOMETHING TO HOLD HIM OVER UNTIL THEN.    PLEASE ADVISE     Does the patient have less than a 3 day supply:  [x] Yes  [] No    Miesha Alberts Rep   02/01/22 10:00 EST

## 2022-02-03 NOTE — PROGRESS NOTES
CHIEF COMPLAINT  Lower back pain.     Subjective   Mark Marie is a 81 y.o. male who was referred by Dr. Diana Linares MD to our pain management clinic for consultation, evaluation and treatment of lower back pain .His lower back pain started about 1 month ago without any significant injuries were radiating down to the right foot.  Patient states that his pain started about 2 weeks ago without any significant injuries and has been getting worse since then. He had epidurals and RFA of medial branch (lasting 8 months) several years ago with Dr. Bolivar.     Lower back pain is 2/10 on VAS, at maximum is 8/10. Pain is deep, pressure, throbbing in nature. Pain is referred right sided lower back and right lateral leg and intermittently right lateral thigh. The pain is constant. The pain is improved by sitting, resting. The pain is worse with lying down, walking, lifting. Unable to do gardening which he enjoys.     PHQ-9- 2  SOAPP-0     PMH:   DM-2, hypertension, history of breast cancer, history of SCC    Hx: Retired , enjoys gardening.     Current Medications:   Tramadol 50 mg TID PRN  Norco       Past Medications:  Tramadol  Norco-by urologist for possible kidney stone    Past Modalities:  TENS:       no          Physical Therapy Within The Last 6 Months     no  Psychotherapy     no  Massage Therapy      no    Patient Complains Of:  Uro-Fecal Incontinence no  Weight Gain/Loss  no  Fever/Chills   no  Weakness   no      PEG Assessment   What number best describes your pain on average in the past week?5  What number best describes how, during the past week, pain has interfered with your enjoyment of life?5  What number best describes how, during the past week, pain has interfered with your general activity?  6        Current Outpatient Medications:   •  donepezil (ARICEPT) 5 MG tablet, TAKE 1 TABLET BY MOUTH ONCE DAILY AT NIGHT, Disp: 90 tablet, Rfl: 0  •  HYDROcodone-acetaminophen (NORCO) 5-325 MG per  tablet, , Disp: , Rfl:   •  levothyroxine (SYNTHROID, LEVOTHROID) 25 MCG tablet, Take 25 mcg by mouth Daily., Disp: , Rfl:   •  losartan (COZAAR) 50 MG tablet, Take 1 tablet by mouth once daily, Disp: 90 tablet, Rfl: 0  •  metFORMIN (GLUCOPHAGE) 500 MG tablet, Take 1 tablet by mouth twice daily, Disp: 180 tablet, Rfl: 0  •  simvastatin (ZOCOR) 80 MG tablet, Take 1 tablet by mouth Daily., Disp: 90 tablet, Rfl: 3  •  tamsulosin (FLOMAX) 0.4 MG capsule 24 hr capsule, , Disp: , Rfl:   •  traMADol (ULTRAM) 50 MG tablet, Take 1 tablet by mouth Every 6 (Six) Hours As Needed for Moderate Pain ., Disp: 120 tablet, Rfl: 0  •  Euthyrox 25 MCG tablet, Take 1 tablet by mouth once daily, Disp: 90 tablet, Rfl: 0  •  potassium chloride (K-DUR,KLOR-CON) 20 MEQ CR tablet, TAKE 1 TABLET BY MOUTH DAILY, Disp: 90 tablet, Rfl: 3    The following portions of the patient's history were reviewed and updated as appropriate: allergies, current medications, past family history, past medical history, past social history, past surgical history, and problem list.      REVIEW OF PERTINENT MEDICAL DATA    Past Medical History:   Diagnosis Date   • Diabetes mellitus (HCC)    • Hyperlipidemia    • Hypertension    • Hypothyroidism    • Prostate cancer (HCC) 2011   • Squamous cell carcinoma of skin     right temple     Past Surgical History:   Procedure Laterality Date   • HEMORROIDECTOMY     • HERNIA REPAIR      inguinal and umbilical   • KNEE SURGERY Bilateral     trimmed cartilage     Family History   Problem Relation Age of Onset   • Heart disease Father    • Hypertension Father    • Hypertension Mother    • Other Mother         Alzheimers   • Cancer Brother         prostate   • Cancer Brother         prostate   • Hypertension Brother      Social History     Socioeconomic History   • Marital status:    Tobacco Use   • Smoking status: Never Smoker   • Smokeless tobacco: Never Used   Substance and Sexual Activity   • Alcohol use: Yes      "Comment: rarely;  caffeine 1c qd   • Drug use: No         Review of Systems   Musculoskeletal: Positive for arthralgias and back pain.         Vitals:    02/07/22 1443   BP: 145/65   BP Location: Left arm   Patient Position: Sitting   Cuff Size: Adult   Pulse: 79   Temp: 97.9 °F (36.6 °C)   TempSrc: Oral   SpO2: 97%   Weight: 83.9 kg (185 lb)   Height: 162.6 cm (64\")   PainSc:   2         Objective   Physical Exam  Musculoskeletal:         General: Tenderness present.        Legs:    Neurological:      Deep Tendon Reflexes:      Reflex Scores:       Patellar reflexes are 2+ on the right side and 2+ on the left side.       Achilles reflexes are 2+ on the right side and 2+ on the left side.     Comments: Motor strength 5/5 b/l LE  Sensory intact b/l LE             Imaging Reviewed:  MRI lumbar spine-1/25/2022  L1-2-mild bilateral facet arthropathy.  L2-3-mild bilateral facet arthropathy.  Mild bilateral neural foraminal stenosis.  L3-4-mild bilateral facet arthropathy.  Mild spinal canal stenosis.  Moderate bilateral neural foraminal stenosis.  L4-5-moderate disc bulge.  Mild bilateral facet arthropathy.  Mild spinal canal stenosis.  Moderate right and moderate to severe left neural foraminal stenosis.  L5-S1-mild right and moderate left facet arthropathy.  Moderate bilateral neural foraminal stenosis.    Assessment:    1. DDD (degenerative disc disease), lumbar    2. Lumbar spondylosis         Plan:   1. Defer UDS for now.   2. We discussed trying a course of formal physical therapy.  Physical therapy can help strengthen and stretch the muscles around the joints. Continue to be as active as possible. Start physical therapy as it will help generalized pain and follow up with HEP.   3. Patient's right radicular symptoms are in S1 dermatomes. MRI shows moderate b/l neural foraminal stenosis at L5-S1.  Discussed lumbar ANAHY L5-S1 (right sided) Discussed the possibility of infection, bleeding, nerve damage, post dural " puncture headache, increased pain, paraplegia. Patient understands and agrees.   4. Discussed with patient that some of his axial lower back pain is originating from facet joint. He had singificant pain relief from previous RFA and MRI also shows facet arthritis. If axial back pain continues after LESI, we may consider RFA MBB in future.     RTC for injection and then 3 week follow up.     Nino Hickman DO  Pain Management   [facility]         INSPECT REPORT    As part of the patient's treatment plan, I may be prescribing controlled substances. The patient has been made aware of appropriate use of such medications, including potential risk of somnolence, limited ability to drive and/or work safely, and the potential for dependence or overdose. It has also been made clear that these medications are for use by this patient only, without concomitant use of alcohol or other substances unless prescribed.     Patient has completed prescribing agreement detailing terms of continued prescribing of controlled substances, including monitoring INSPECT reports, urine drug screening, and pill counts if necessary. The patient is aware that inappropriate use will results in cessation of prescribing such medications.    INSPECT report has been reviewed and scanned into the patient's chart.      EMR Dragon/Transcription Disclaimer:   Much of this encounter note is an electronic transcription/translation of spoken language to printed text. The electronic translation of spoken language may permit erroneous, or at times, nonsensical words or phrases to be inadvertently transcribed; Although I have reviewed the note for such errors, some may still exist.

## 2022-02-07 ENCOUNTER — OFFICE VISIT (OUTPATIENT)
Dept: PAIN MEDICINE | Facility: CLINIC | Age: 82
End: 2022-02-07

## 2022-02-07 VITALS
TEMPERATURE: 97.9 F | HEART RATE: 79 BPM | SYSTOLIC BLOOD PRESSURE: 145 MMHG | HEIGHT: 64 IN | OXYGEN SATURATION: 97 % | DIASTOLIC BLOOD PRESSURE: 65 MMHG | WEIGHT: 185 LBS | BODY MASS INDEX: 31.58 KG/M2

## 2022-02-07 DIAGNOSIS — M47.816 LUMBAR SPONDYLOSIS: ICD-10-CM

## 2022-02-07 DIAGNOSIS — M51.36 DDD (DEGENERATIVE DISC DISEASE), LUMBAR: Primary | ICD-10-CM

## 2022-02-07 PROCEDURE — 99204 OFFICE O/P NEW MOD 45 MIN: CPT | Performed by: STUDENT IN AN ORGANIZED HEALTH CARE EDUCATION/TRAINING PROGRAM

## 2022-02-07 RX ORDER — LEVOTHYROXINE SODIUM 0.03 MG/1
25 TABLET ORAL DAILY
COMMUNITY
End: 2022-03-29 | Stop reason: SDUPTHER

## 2022-02-17 ENCOUNTER — PATIENT ROUNDING (BHMG ONLY) (OUTPATIENT)
Dept: PAIN MEDICINE | Facility: CLINIC | Age: 82
End: 2022-02-17

## 2022-02-17 NOTE — PROGRESS NOTES
February 17, 2022    Hello, may I speak with Mark Marie?    My name is     I am  with [facility]  [address]  [place] [address].    Before we get started may I verify your date of birth? 1940    I am calling to officially welcome you to our practice and ask about your recent visit. Is this a good time to talk?  yes    Tell me about your visit with us. What things went well?  explained everything woderful       We're always looking for ways to make our patients' experiences even better. Do you have recommendations on ways we may improve? Dr Hickman was excellent explaining everything    Overall were you satisfied with your first visit to our practice? yes       I appreciate you taking the time to speak with me today. Is there anything else I can do for you?  Everything was really good      Thank you, and have a great day.

## 2022-02-22 ENCOUNTER — HOSPITAL ENCOUNTER (OUTPATIENT)
Dept: PAIN MEDICINE | Facility: HOSPITAL | Age: 82
Discharge: HOME | End: 2022-02-22

## 2022-02-22 VITALS
RESPIRATION RATE: 16 BRPM | TEMPERATURE: 97.3 F | SYSTOLIC BLOOD PRESSURE: 115 MMHG | HEART RATE: 78 BPM | BODY MASS INDEX: 31.58 KG/M2 | OXYGEN SATURATION: 96 % | DIASTOLIC BLOOD PRESSURE: 62 MMHG | WEIGHT: 185 LBS | HEIGHT: 64 IN

## 2022-02-22 DIAGNOSIS — R52 PAIN: ICD-10-CM

## 2022-02-22 DIAGNOSIS — M51.36 DDD (DEGENERATIVE DISC DISEASE), LUMBAR: Primary | ICD-10-CM

## 2022-02-22 PROCEDURE — 62323 NJX INTERLAMINAR LMBR/SAC: CPT | Performed by: STUDENT IN AN ORGANIZED HEALTH CARE EDUCATION/TRAINING PROGRAM

## 2022-02-22 PROCEDURE — 25010000002 METHYLPREDNISOLONE PER 80 MG: Performed by: STUDENT IN AN ORGANIZED HEALTH CARE EDUCATION/TRAINING PROGRAM

## 2022-02-22 PROCEDURE — 0 IOPAMIDOL 41 % SOLUTION: Performed by: STUDENT IN AN ORGANIZED HEALTH CARE EDUCATION/TRAINING PROGRAM

## 2022-02-22 PROCEDURE — 77003 FLUOROGUIDE FOR SPINE INJECT: CPT

## 2022-02-22 RX ORDER — METHYLPREDNISOLONE ACETATE 80 MG/ML
80 INJECTION, SUSPENSION INTRA-ARTICULAR; INTRALESIONAL; INTRAMUSCULAR; SOFT TISSUE ONCE
Status: COMPLETED | OUTPATIENT
Start: 2022-02-22 | End: 2022-02-22

## 2022-02-22 RX ADMIN — METHYLPREDNISOLONE ACETATE 80 MG: 80 INJECTION, SUSPENSION INTRA-ARTICULAR; INTRALESIONAL; INTRAMUSCULAR; SOFT TISSUE at 15:07

## 2022-02-22 RX ADMIN — IOPAMIDOL 1 ML: 408 INJECTION, SOLUTION INTRATHECAL at 15:06

## 2022-02-22 NOTE — PROCEDURES
PREOPERATIVE DIAGNOSIS:    1. Lumbar DDD    POSTOPERATIVE DIAGNOSIS: Same    PROCEDURE:  Lumbar epidural steroid injection L5-S1    PROCEDURE NOTE:  After obtaining written informed consent patient was taken to the procedure room. Pre-procedure blood pressure and pulse were stable and recorded in patients clinic chart.     The patient was placed in the prone position. The lower back was prepped with antiseptic solution and draped in the usual sterile fashion.  The skin over the L5-S1 space was identified under fluoroscopic guidance and infiltrated with 1% lidocaine for local anesthesia via 25 gauge needle.  A 20-gauge tuohy needle was used to access the epidural space using loss of resistance to air technique. Following negative aspiration, 2 cc of the omnipaque dye was injected.  There was good spread of the dye from L3-L5 area. A mixture containing  3 ml of saline with 80 mg of depo-medrol was injected. There was no evidence of CSF, paresthesia or vascular spread. The needle was removed. Skin was cleaned and band aid was applied.    Following the procedure the patient's vital signs were stable. The patient was discharged home in good condition after being given discharge instructions.    COMPLICATIONS: None    Nino Hickman DO  Pain Management   Psychiatric

## 2022-02-22 NOTE — DISCHARGE INSTRUCTIONS
EPIDURAL STEROID INJECTION          An epidural steroid injection is a shot of steroid medicine and numbing medicine that is given into the space between the spinal cord and the bones of the back (epidural space).  The injection helps relieve pain by an irritated or swollen nerve root.    TELL YOUR HEALTH CARE PROVIDER ABOUT:  • Any allergies you have  • All medicines you are taking including any over the counter medicines  • Any blood disorders you have  • Any surgeries you have had  • Any medical conditions you have  • Whether you are pregnant or may be pregnant    WHAT ARE THE RISK?  Generally, this is a safe procedure. However,problems may occur, including  • Headache  • Bleeding  • Infection  • Allergic Reaction  • Nerve Damage    WHAT CAN I EXPECT AFTER THE PROCEDURE?    INJECTION SITE  • Remove the Band-Aid/s after 24 hours  • Check your injection site every day for signs of infection.  Check for:             Redness             Bleeding (small amt is normal)             Warmth             Pus or bad odor  • Some numbness may be experienced for several hours following the procedure.  • Avoid using heat on the injection site for 24 hours. You may use ice intermittently if needed by placing a         towel between your skin and the ice bag and using the ice for 20 minutes 2-3 times a day.  • Do not take baths, swim or use a hot tub for 24 hours.    ACTIVITY  • No strenuous activity for 24 hours then return to normal activity as tolerated.  • If your leg is numb, no driving until full sensation and strength has returned.    GENERAL INSTRUCTIONS:  • The injection site may feel numb, use ice with caution if numbness is present and no heat for 24 hours or until numbness is gone.   • If you have numbness or weakness in your arm or leg, use those areas with caution until normal sensation returns.  • It is not uncommon to notice an increase in discomfort or a change in the location of discomfort for 3-4 days after  the procedure.  If discomfort is noticed at the injection site, ice may be            applied to that area for 20 min 2-3 times a day.  • Take the pain medicine your physician has prescribed or over the counter pain relievers as long as you do not have any contraindications.  • If you are a diabetic, monitor your blood sugar closely.  The steroids used in your procedure may increase your blood sugar level up to 36 hours after the injection.  If your blood sugar is greater than 250, call the physician that helps you monitor your blood sugar.  • Keep all follow-up visits as scheduled by your health care provider. This is important.    CONTACT OUR OFFICE IF:  • You have any of these signs of infection            -Redness, swelling, or warmth around your injection site.            -Fluid or blood coming from your injection site (small amt of blood is normal)            -Pus or a bad odor from your injection site            -A fever  • You develop a severe headache or a stiff neck  • You lose control of your bladder or bowel movements      PAIN MANAGEMENT CENTER HOURS   • Monday-Friday 7:30 am. - 4:00 pm.  For any problem related to your procedure we can be reached at [phone]  • If you experience an emergency with your procedure, call [phone] or go to the emergency room.

## 2022-02-22 NOTE — H&P
H and P reviewed from previous visit and no changes to patient's clinical presentation. Will proceed with procedure as planned. Patient denies being on blood thinners. Hx of DM-2, but well controlled.     Nino Hickman DO  Pain Management   [facility]

## 2022-02-23 ENCOUNTER — TELEPHONE (OUTPATIENT)
Dept: PAIN MEDICINE | Facility: HOSPITAL | Age: 82
End: 2022-02-23

## 2022-02-23 NOTE — TELEPHONE ENCOUNTER
Post op procedure call made, patient reports doing well, advised to continue ice today and can rotate to heat tomorrow. Explained it can take a couple days for steroid to take effect. Patient understands.

## 2022-03-14 NOTE — PROGRESS NOTES
Subjective   Mark Marie is a 81 y.o. male is here for follow up for lower back pain. Patient was last seen on 2/22/2022 for LESI L5-S1 with 80% pain relief. He is back to his baseline and able to do normal activities he enjoys. Takes Tramadol 1 tablet PRN.     On last visit:     Lower back pain is 1/10 on VAS, at maximum 1/10.  Pain is deep, pressure, throbbing in nature.  Referred to right-sided lower back and right lateral leg and intermittently to right lateral thigh.  Pain is constant.  Improved by LESI.     Previous Injection:   2/22/2022-LESI L5-S1- 80% pain relief.     Hx: Referred by Dr. Diana Linares MD to our pain management clinic for consultation, evaluation and treatment of lower back pain .His lower back pain started about 1 month ago without any significant injuries were radiating down to the right foot.  Patient states that his pain started about 2 weeks ago without any significant injuries and has been getting worse since then. He had epidurals and RFA of medial branch (lasting 8 months) several years ago with Dr. Bolivar.        PHQ-9- 2  SOAPP-0      PMH:   DM-2, hypertension, history of breast cancer, history of SCC     Hx: Retired , enjoys gardening.      Current Medications:   Tramadol 50 mg TID PRN  Norco         Past Medications:  Tramadol  Norco-by urologist for possible kidney stone     Past Modalities:  TENS:                                                                          no                                                  Physical Therapy Within The Last 6 Months              no  Psychotherapy                                                            no  Massage Therapy                                                       no     Patient Complains Of:  Uro-Fecal Incontinence          no  Weight Gain/Loss                   no  Fever/Chills                             no  Weakness                               no      Current Outpatient Medications:   •   donepezil (ARICEPT) 5 MG tablet, TAKE 1 TABLET BY MOUTH ONCE DAILY AT NIGHT, Disp: 90 tablet, Rfl: 0  •  Euthyrox 25 MCG tablet, Take 1 tablet by mouth once daily, Disp: 90 tablet, Rfl: 0  •  HYDROcodone-acetaminophen (NORCO) 5-325 MG per tablet, , Disp: , Rfl:   •  levothyroxine (SYNTHROID, LEVOTHROID) 25 MCG tablet, Take 25 mcg by mouth Daily., Disp: , Rfl:   •  losartan (COZAAR) 50 MG tablet, Take 1 tablet by mouth once daily, Disp: 90 tablet, Rfl: 0  •  metFORMIN (GLUCOPHAGE) 500 MG tablet, Take 1 tablet by mouth twice daily, Disp: 180 tablet, Rfl: 0  •  potassium chloride (K-DUR,KLOR-CON) 20 MEQ CR tablet, TAKE 1 TABLET BY MOUTH DAILY, Disp: 90 tablet, Rfl: 3  •  simvastatin (ZOCOR) 80 MG tablet, Take 1 tablet by mouth Daily., Disp: 90 tablet, Rfl: 3  •  tamsulosin (FLOMAX) 0.4 MG capsule 24 hr capsule, , Disp: , Rfl:   •  traMADol (ULTRAM) 50 MG tablet, Take 1 tablet by mouth Every 6 (Six) Hours As Needed for Moderate Pain ., Disp: 120 tablet, Rfl: 0    The following portions of the patient's history were reviewed and updated as appropriate: allergies, current medications, past family history, past medical history, past social history, past surgical history, and problem list.      REVIEW OF PERTINENT MEDICAL DATA    Past Medical History:   Diagnosis Date   • Diabetes mellitus (HCC)    • Hyperlipidemia    • Hypertension    • Hypothyroidism    • Prostate cancer (HCC) 2011   • Squamous cell carcinoma of skin     right temple     Past Surgical History:   Procedure Laterality Date   • HEMORROIDECTOMY     • HERNIA REPAIR      inguinal and umbilical   • KNEE SURGERY Bilateral     trimmed cartilage     Family History   Problem Relation Age of Onset   • Heart disease Father    • Hypertension Father    • Hypertension Mother    • Other Mother         Alzheimers   • Cancer Brother         prostate   • Cancer Brother         prostate   • Hypertension Brother      Social History     Socioeconomic History   • Marital status:  "   Tobacco Use   • Smoking status: Never Smoker   • Smokeless tobacco: Never Used   Substance and Sexual Activity   • Alcohol use: Yes     Comment: rarely;  caffeine 1c qd   • Drug use: No         Review of Systems   Musculoskeletal: Positive for back pain.         Vitals:    03/16/22 1037   BP: 157/72   Pulse: 80   Resp: 16   SpO2: 97%   Weight: 83.9 kg (185 lb)   Height: 162.6 cm (64.02\")   PainSc:   1         Objective   Physical Exam  Musculoskeletal:         General: Tenderness present.        Legs:    Neurological:      Deep Tendon Reflexes:      Reflex Scores:       Patellar reflexes are 2+ on the right side and 2+ on the left side.       Achilles reflexes are 2+ on the right side and 2+ on the left side.     Comments: Motor strength 5/5 b/l LE  Sensory intact b/l LE             Imaging Reviewed:  MRI lumbar spine-1/25/2022  L1-2-mild bilateral facet arthropathy.  L2-3-mild bilateral facet arthropathy.  Mild bilateral neural foraminal stenosis.  L3-4-mild bilateral facet arthropathy.  Mild spinal canal stenosis.  Moderate bilateral neural foraminal stenosis.  L4-5-moderate disc bulge.  Mild bilateral facet arthropathy.  Mild spinal canal stenosis.  Moderate right and moderate to severe left neural foraminal stenosis.  L5-S1-mild right and moderate left facet arthropathy.  Moderate bilateral neural foraminal stenosis.         Assessment:    1. DDD (degenerative disc disease), lumbar    2. Lumbar spondylosis           Plan:   1. Defer UDS for now.   2. Excellent pain relief from LESI L5-S1. Will repeat before his Trip to [place] if pain returns by then.      RTC as needed.      Nino Hickman DO  Pain Management   [facility]            INSPECT REPORT    As part of the patient's treatment plan, I may be prescribing controlled substances. The patient has been made aware of appropriate use of such medications, including potential risk of somnolence, limited ability to drive and/or work safely, and the " potential for dependence or overdose. It has also been made clear that these medications are for use by this patient only, without concomitant use of alcohol or other substances unless prescribed.     Patient has completed prescribing agreement detailing terms of continued prescribing of controlled substances, including monitoring INSPECT reports, urine drug screening, and pill counts if necessary. The patient is aware that inappropriate use will results in cessation of prescribing such medications.    INSPECT report has been reviewed and scanned into the patient's chart.

## 2022-03-16 ENCOUNTER — OFFICE VISIT (OUTPATIENT)
Dept: PAIN MEDICINE | Facility: CLINIC | Age: 82
End: 2022-03-16

## 2022-03-16 VITALS
DIASTOLIC BLOOD PRESSURE: 72 MMHG | OXYGEN SATURATION: 97 % | HEIGHT: 64 IN | WEIGHT: 185 LBS | HEART RATE: 80 BPM | SYSTOLIC BLOOD PRESSURE: 157 MMHG | BODY MASS INDEX: 31.58 KG/M2 | RESPIRATION RATE: 16 BRPM

## 2022-03-16 DIAGNOSIS — M51.36 DDD (DEGENERATIVE DISC DISEASE), LUMBAR: Primary | ICD-10-CM

## 2022-03-16 DIAGNOSIS — M47.816 LUMBAR SPONDYLOSIS: ICD-10-CM

## 2022-03-16 PROCEDURE — 99213 OFFICE O/P EST LOW 20 MIN: CPT | Performed by: STUDENT IN AN ORGANIZED HEALTH CARE EDUCATION/TRAINING PROGRAM

## 2022-03-25 ENCOUNTER — TELEPHONE (OUTPATIENT)
Dept: PAIN MEDICINE | Facility: CLINIC | Age: 82
End: 2022-03-25

## 2022-03-25 DIAGNOSIS — M51.36 DDD (DEGENERATIVE DISC DISEASE), LUMBAR: Primary | ICD-10-CM

## 2022-03-25 NOTE — TELEPHONE ENCOUNTER
Provider: ADRIENNE MCGUIRE DO  Caller: KAYDEN MARLEY  Relationship to Patient: PATIENT     Phone Number: [phone]  Reason for Call: WOULD LIKE TO SCHEDULE ANOTHER LUMBAR EPIDURAL  When was the patient last seen: F/U 03/16/2022  PROCEDURE 02/22/22

## 2022-03-29 DIAGNOSIS — G89.29 CHRONIC BILATERAL LOW BACK PAIN WITHOUT SCIATICA: ICD-10-CM

## 2022-03-29 DIAGNOSIS — R41.3 MEMORY CHANGE: ICD-10-CM

## 2022-03-29 DIAGNOSIS — M54.50 CHRONIC BILATERAL LOW BACK PAIN WITHOUT SCIATICA: ICD-10-CM

## 2022-03-29 RX ORDER — SIMVASTATIN 80 MG
40 TABLET ORAL DAILY
Qty: 90 TABLET | Refills: 3 | Status: SHIPPED | OUTPATIENT
Start: 2022-03-29 | End: 2023-04-03

## 2022-03-29 RX ORDER — LEVOTHYROXINE SODIUM 0.03 MG/1
25 TABLET ORAL DAILY
Qty: 90 TABLET | Refills: 3 | Status: SHIPPED | OUTPATIENT
Start: 2022-03-29 | End: 2022-07-31 | Stop reason: SDUPTHER

## 2022-03-29 RX ORDER — TRAMADOL HYDROCHLORIDE 50 MG/1
50 TABLET ORAL EVERY 6 HOURS PRN
Qty: 120 TABLET | Refills: 0 | Status: SHIPPED | OUTPATIENT
Start: 2022-03-29 | End: 2022-06-27

## 2022-03-29 RX ORDER — LOSARTAN POTASSIUM 50 MG/1
50 TABLET ORAL DAILY
Qty: 90 TABLET | Refills: 3 | Status: SHIPPED | OUTPATIENT
Start: 2022-03-29 | End: 2023-03-25

## 2022-03-29 RX ORDER — DONEPEZIL HYDROCHLORIDE 5 MG/1
5 TABLET, FILM COATED ORAL NIGHTLY
Qty: 90 TABLET | Refills: 3 | Status: SHIPPED | OUTPATIENT
Start: 2022-03-29 | End: 2023-04-03

## 2022-04-07 ENCOUNTER — HOSPITAL ENCOUNTER (OUTPATIENT)
Dept: PAIN MEDICINE | Facility: HOSPITAL | Age: 82
Discharge: HOME | End: 2022-04-07

## 2022-04-07 VITALS
HEIGHT: 64 IN | SYSTOLIC BLOOD PRESSURE: 127 MMHG | BODY MASS INDEX: 31.58 KG/M2 | WEIGHT: 185 LBS | HEART RATE: 81 BPM | DIASTOLIC BLOOD PRESSURE: 73 MMHG | TEMPERATURE: 97.3 F | OXYGEN SATURATION: 97 % | RESPIRATION RATE: 16 BRPM

## 2022-04-07 DIAGNOSIS — M51.36 DDD (DEGENERATIVE DISC DISEASE), LUMBAR: Primary | ICD-10-CM

## 2022-04-07 DIAGNOSIS — R52 PAIN: ICD-10-CM

## 2022-04-07 PROCEDURE — 25010000002 METHYLPREDNISOLONE PER 40 MG: Performed by: STUDENT IN AN ORGANIZED HEALTH CARE EDUCATION/TRAINING PROGRAM

## 2022-04-07 PROCEDURE — 62323 NJX INTERLAMINAR LMBR/SAC: CPT | Performed by: STUDENT IN AN ORGANIZED HEALTH CARE EDUCATION/TRAINING PROGRAM

## 2022-04-07 PROCEDURE — 77003 FLUOROGUIDE FOR SPINE INJECT: CPT

## 2022-04-07 PROCEDURE — 0 IOPAMIDOL 41 % SOLUTION: Performed by: STUDENT IN AN ORGANIZED HEALTH CARE EDUCATION/TRAINING PROGRAM

## 2022-04-07 RX ORDER — METHYLPREDNISOLONE ACETATE 40 MG/ML
40 INJECTION, SUSPENSION INTRA-ARTICULAR; INTRALESIONAL; INTRAMUSCULAR; SOFT TISSUE ONCE
Status: COMPLETED | OUTPATIENT
Start: 2022-04-07 | End: 2022-04-07

## 2022-04-07 RX ADMIN — METHYLPREDNISOLONE ACETATE 40 MG: 40 INJECTION, SUSPENSION INTRA-ARTICULAR; INTRALESIONAL; INTRAMUSCULAR; INTRASYNOVIAL; SOFT TISSUE at 09:54

## 2022-04-07 RX ADMIN — IOPAMIDOL 1.5 ML: 408 INJECTION, SOLUTION INTRATHECAL at 09:54

## 2022-04-07 NOTE — H&P
H and P reviewed from previous visit and no changes to patient's clinical presentation. Will proceed with procedure as planned. Patient denies being on blood thinners. Well controlled DM-2.     Nino Hickman DO  Pain Management   [facility]

## 2022-04-07 NOTE — PROCEDURES
PREOPERATIVE DIAGNOSIS:    1. Lumbar DDD    POSTOPERATIVE DIAGNOSIS: Same    PROCEDURE:  Lumbar epidural steroid injection L5-S1    PROCEDURE NOTE:  After obtaining written informed consent patient was taken to the procedure room. Pre-procedure blood pressure and pulse were stable and recorded in patients clinic chart.     The patient was placed in the prone position. The lower back was prepped with antiseptic solution and draped in the usual sterile fashion.  The skin over the L5-S1 space was identified under fluoroscopic guidance and infiltrated with 1% lidocaine for local anesthesia via 25 gauge needle.  A 20-gauge tuohy needle was used to access the epidural space using loss of resistance to air technique. Following negative aspiration, 2 cc of the omnipaque dye was injected.  There was good spread of the dye from L3-L5 area. A mixture containing  3 ml of saline with 40 mg of depo-medrol was injected. There was no evidence of CSF, paresthesia or vascular spread. The needle was removed. Skin was cleaned and band aid was applied.    Following the procedure the patient's vital signs were stable. The patient was discharged home in good condition after being given discharge instructions.    COMPLICATIONS: None    Nino Hickman DO  Pain Management   [facility]

## 2022-04-07 NOTE — DISCHARGE INSTRUCTIONS

## 2022-06-01 ENCOUNTER — OFFICE VISIT (OUTPATIENT)
Dept: PAIN MEDICINE | Facility: CLINIC | Age: 82
End: 2022-06-01

## 2022-06-01 VITALS
RESPIRATION RATE: 16 BRPM | WEIGHT: 185 LBS | HEIGHT: 64 IN | BODY MASS INDEX: 31.58 KG/M2 | SYSTOLIC BLOOD PRESSURE: 122 MMHG | OXYGEN SATURATION: 96 % | DIASTOLIC BLOOD PRESSURE: 71 MMHG | HEART RATE: 81 BPM

## 2022-06-01 DIAGNOSIS — M51.36 DDD (DEGENERATIVE DISC DISEASE), LUMBAR: Primary | ICD-10-CM

## 2022-06-01 DIAGNOSIS — M47.816 LUMBAR SPONDYLOSIS: ICD-10-CM

## 2022-06-01 PROCEDURE — 99213 OFFICE O/P EST LOW 20 MIN: CPT | Performed by: STUDENT IN AN ORGANIZED HEALTH CARE EDUCATION/TRAINING PROGRAM

## 2022-06-01 NOTE — PROGRESS NOTES
Subjective   Mark Marie is a 81 y.o. male is here for follow-up for lower back pain.  Last seen on 4/7/2022 for LESI L5-S1 with 90% pain relief. Had excellent trip to [place] where he was able to walk for longer periods with minimum pain meds.       On last visit:     Lower back pain is 1/10 on VAS, and maximum 1/10.  Pain is deep, pressure, throbbing in nature.  Refer to right sided lower back and right lateral leg and intermittently to right lateral thigh.  Pain is constant.  Improved by LESI.    Previous Injection:   4/7/2022-LESI L5-S1- 90% pain relief.   2/22/2022-LESI L5-S1- 80% pain relief-2 months..     Hx: Referred by Dr. Diana Linares MD to our pain management clinic for consultation, evaluation and treatment of lower back pain .His lower back pain started about 1 month ago without any significant injuries were radiating down to the right foot.  Patient states that his pain started about 2 weeks ago without any significant injuries and has been getting worse since then. He had epidurals and RFA of medial branch (lasting 8 months) several years ago with Dr. Bolivar.        PHQ-9- 2  SOAPP-0      PMH:   DM-2, hypertension, history of breast cancer, history of SCC     Hx: Retired , enjoys gardening.      Current Medications:   Tramadol 50 mg TID PRN  Norco         Past Medications:  Tramadol  Norco-by urologist for possible kidney stone     Past Modalities:  TENS:                                                                          no                                                  Physical Therapy Within The Last 6 Months              no  Psychotherapy                                                            no  Massage Therapy                                                       no     Patient Complains Of:  Uro-Fecal Incontinence          no  Weight Gain/Loss                   no  Fever/Chills                             no  Weakness                               no      Current  Outpatient Medications:   •  donepezil (ARICEPT) 5 MG tablet, Take 1 tablet by mouth Every Night., Disp: 90 tablet, Rfl: 3  •  Euthyrox 25 MCG tablet, Take 1 tablet by mouth once daily, Disp: 90 tablet, Rfl: 0  •  HYDROcodone-acetaminophen (NORCO) 5-325 MG per tablet, , Disp: , Rfl:   •  levothyroxine (SYNTHROID, LEVOTHROID) 25 MCG tablet, Take 1 tablet by mouth Daily., Disp: 90 tablet, Rfl: 3  •  losartan (COZAAR) 50 MG tablet, Take 1 tablet by mouth Daily., Disp: 90 tablet, Rfl: 3  •  metFORMIN (GLUCOPHAGE) 500 MG tablet, Take 1 tablet by mouth 2 (Two) Times a Day., Disp: 180 tablet, Rfl: 3  •  potassium chloride (K-DUR,KLOR-CON) 20 MEQ CR tablet, TAKE 1 TABLET BY MOUTH DAILY, Disp: 90 tablet, Rfl: 3  •  simvastatin (ZOCOR) 80 MG tablet, Take 0.5 tablets by mouth Daily., Disp: 90 tablet, Rfl: 3  •  tamsulosin (FLOMAX) 0.4 MG capsule 24 hr capsule, , Disp: , Rfl:   •  traMADol (ULTRAM) 50 MG tablet, Take 1 tablet by mouth Every 6 (Six) Hours As Needed for Moderate Pain ., Disp: 120 tablet, Rfl: 0    The following portions of the patient's history were reviewed and updated as appropriate: allergies, current medications, past family history, past medical history, past social history, past surgical history, and problem list.      REVIEW OF PERTINENT MEDICAL DATA    Past Medical History:   Diagnosis Date   • Diabetes mellitus (HCC)    • Hyperlipidemia    • Hypertension    • Hypothyroidism    • Joint pain    • Low back pain    • Prostate cancer (HCC) 2011   • Squamous cell carcinoma of skin     right temple     Past Surgical History:   Procedure Laterality Date   • HEMORROIDECTOMY     • HERNIA REPAIR      inguinal and umbilical   • KNEE SURGERY Bilateral     trimmed cartilage     Family History   Problem Relation Age of Onset   • Heart disease Father    • Hypertension Father    • Hypertension Mother    • Other Mother         Alzheimers   • Cancer Brother         prostate   • Cancer Brother         prostate   •  "Hypertension Brother      Social History     Socioeconomic History   • Marital status:    Tobacco Use   • Smoking status: Never Smoker   • Smokeless tobacco: Never Used   Substance and Sexual Activity   • Alcohol use: Yes     Comment: rarely;  caffeine 1c qd   • Drug use: No         Review of Systems   Musculoskeletal: Positive for back pain.         Vitals:    06/01/22 1310   BP: 122/71   Pulse: 81   Resp: 16   SpO2: 96%   Weight: 83.9 kg (185 lb)   Height: 162.6 cm (64\")   PainSc: 0-No pain         Objective   Physical Exam  Musculoskeletal:         General: Tenderness present.        Legs:    Neurological:      Deep Tendon Reflexes:      Reflex Scores:       Patellar reflexes are 2+ on the right side and 2+ on the left side.       Achilles reflexes are 2+ on the right side and 2+ on the left side.     Comments: Motor strength 5/5 b/l LE  Sensory intact b/l LE             Imaging Reviewed:  MRI lumbar spine-1/25/2022  L1-2-mild bilateral facet arthropathy.  L2-3-mild bilateral facet arthropathy.  Mild bilateral neural foraminal stenosis.  L3-4-mild bilateral facet arthropathy.  Mild spinal canal stenosis.  Moderate bilateral neural foraminal stenosis.  L4-5-moderate disc bulge.  Mild bilateral facet arthropathy.  Mild spinal canal stenosis.  Moderate right and moderate to severe left neural foraminal stenosis.  L5-S1-mild right and moderate left facet arthropathy.  Moderate bilateral neural foraminal stenosis.         Assessment:    1. DDD (degenerative disc disease), lumbar    2. Lumbar spondylosis           Plan:   1. Defer UDS for now.   2. Excellent pain relief from LESI L5-S1. Will repeat as needed.      RTC as needed.      Nino Hickman DO  Pain Management   [facility]            INSPECT REPORT    As part of the patient's treatment plan, I may be prescribing controlled substances. The patient has been made aware of appropriate use of such medications, including potential risk of somnolence, " limited ability to drive and/or work safely, and the potential for dependence or overdose. It has also been made clear that these medications are for use by this patient only, without concomitant use of alcohol or other substances unless prescribed.     Patient has completed prescribing agreement detailing terms of continued prescribing of controlled substances, including monitoring INSPECT reports, urine drug screening, and pill counts if necessary. The patient is aware that inappropriate use will results in cessation of prescribing such medications.    INSPECT report has been reviewed and scanned into the patient's chart.

## 2022-06-27 DIAGNOSIS — G89.29 CHRONIC BILATERAL LOW BACK PAIN WITHOUT SCIATICA: ICD-10-CM

## 2022-06-27 DIAGNOSIS — M54.50 CHRONIC BILATERAL LOW BACK PAIN WITHOUT SCIATICA: ICD-10-CM

## 2022-06-27 RX ORDER — TRAMADOL HYDROCHLORIDE 50 MG/1
TABLET ORAL
Qty: 120 TABLET | Refills: 0 | Status: SHIPPED | OUTPATIENT
Start: 2022-06-27 | End: 2022-09-25

## 2022-07-13 ENCOUNTER — OFFICE VISIT (OUTPATIENT)
Dept: FAMILY MEDICINE CLINIC | Facility: CLINIC | Age: 82
End: 2022-07-13

## 2022-07-13 ENCOUNTER — LAB (OUTPATIENT)
Dept: FAMILY MEDICINE CLINIC | Facility: CLINIC | Age: 82
End: 2022-07-13

## 2022-07-13 VITALS
DIASTOLIC BLOOD PRESSURE: 67 MMHG | SYSTOLIC BLOOD PRESSURE: 144 MMHG | HEART RATE: 77 BPM | WEIGHT: 184 LBS | TEMPERATURE: 97.5 F | OXYGEN SATURATION: 96 % | BODY MASS INDEX: 31.58 KG/M2

## 2022-07-13 DIAGNOSIS — E11.8 DISORDER ASSOCIATED WITH TYPE 2 DIABETES MELLITUS: ICD-10-CM

## 2022-07-13 DIAGNOSIS — E78.5 HYPERLIPIDEMIA, UNSPECIFIED HYPERLIPIDEMIA TYPE: ICD-10-CM

## 2022-07-13 DIAGNOSIS — S67.192A CRUSHING INJURY OF RIGHT MIDDLE FINGER, INITIAL ENCOUNTER: Primary | ICD-10-CM

## 2022-07-13 DIAGNOSIS — Z11.59 NEED FOR HEPATITIS C SCREENING TEST: ICD-10-CM

## 2022-07-13 DIAGNOSIS — E03.9 HYPOTHYROIDISM, UNSPECIFIED TYPE: ICD-10-CM

## 2022-07-13 DIAGNOSIS — R53.83 FATIGUE, UNSPECIFIED TYPE: ICD-10-CM

## 2022-07-13 LAB
ALBUMIN SERPL-MCNC: 3.9 G/DL (ref 3.5–5.2)
ALBUMIN/GLOB SERPL: 1.8 G/DL
ALP SERPL-CCNC: 54 U/L (ref 39–117)
ALT SERPL W P-5'-P-CCNC: 12 U/L (ref 1–41)
ANION GAP SERPL CALCULATED.3IONS-SCNC: 10.4 MMOL/L (ref 5–15)
AST SERPL-CCNC: 16 U/L (ref 1–40)
BASOPHILS # BLD AUTO: 0.02 10*3/MM3 (ref 0–0.2)
BASOPHILS NFR BLD AUTO: 0.5 % (ref 0–1.5)
BILIRUB SERPL-MCNC: 0.3 MG/DL (ref 0–1.2)
BUN SERPL-MCNC: 16 MG/DL (ref 8–23)
BUN/CREAT SERPL: 18.6 (ref 7–25)
CALCIUM SPEC-SCNC: 9 MG/DL (ref 8.6–10.5)
CHLORIDE SERPL-SCNC: 105 MMOL/L (ref 98–107)
CHOLEST SERPL-MCNC: 159 MG/DL (ref 0–200)
CO2 SERPL-SCNC: 25.6 MMOL/L (ref 22–29)
CREAT SERPL-MCNC: 0.86 MG/DL (ref 0.76–1.27)
DEPRECATED RDW RBC AUTO: 43 FL (ref 37–54)
EGFRCR SERPLBLD CKD-EPI 2021: 88.1 ML/MIN/1.73
EOSINOPHIL # BLD AUTO: 0.29 10*3/MM3 (ref 0–0.4)
EOSINOPHIL NFR BLD AUTO: 7 % (ref 0.3–6.2)
ERYTHROCYTE [DISTWIDTH] IN BLOOD BY AUTOMATED COUNT: 12.6 % (ref 12.3–15.4)
GLOBULIN UR ELPH-MCNC: 2.2 GM/DL
GLUCOSE SERPL-MCNC: 113 MG/DL (ref 65–99)
HBA1C MFR BLD: 5.8 % (ref 3.5–5.6)
HCT VFR BLD AUTO: 45.2 % (ref 37.5–51)
HCV AB SER DONR QL: NORMAL
HDLC SERPL-MCNC: 54 MG/DL (ref 40–60)
HGB BLD-MCNC: 14.9 G/DL (ref 13–17.7)
IMM GRANULOCYTES # BLD AUTO: 0.01 10*3/MM3 (ref 0–0.05)
IMM GRANULOCYTES NFR BLD AUTO: 0.2 % (ref 0–0.5)
LDLC SERPL CALC-MCNC: 79 MG/DL (ref 0–100)
LDLC/HDLC SERPL: 1.4 {RATIO}
LYMPHOCYTES # BLD AUTO: 0.43 10*3/MM3 (ref 0.7–3.1)
LYMPHOCYTES NFR BLD AUTO: 10.4 % (ref 19.6–45.3)
MCH RBC QN AUTO: 30.8 PG (ref 26.6–33)
MCHC RBC AUTO-ENTMCNC: 33 G/DL (ref 31.5–35.7)
MCV RBC AUTO: 93.4 FL (ref 79–97)
MONOCYTES # BLD AUTO: 0.33 10*3/MM3 (ref 0.1–0.9)
MONOCYTES NFR BLD AUTO: 8 % (ref 5–12)
NEUTROPHILS NFR BLD AUTO: 3.06 10*3/MM3 (ref 1.7–7)
NEUTROPHILS NFR BLD AUTO: 73.9 % (ref 42.7–76)
NRBC BLD AUTO-RTO: 0 /100 WBC (ref 0–0.2)
PLATELET # BLD AUTO: 143 10*3/MM3 (ref 140–450)
PMV BLD AUTO: 10 FL (ref 6–12)
POTASSIUM SERPL-SCNC: 4.5 MMOL/L (ref 3.5–5.2)
PROT SERPL-MCNC: 6.1 G/DL (ref 6–8.5)
RBC # BLD AUTO: 4.84 10*6/MM3 (ref 4.14–5.8)
SODIUM SERPL-SCNC: 141 MMOL/L (ref 136–145)
TRIGL SERPL-MCNC: 148 MG/DL (ref 0–150)
TSH SERPL DL<=0.05 MIU/L-ACNC: 3.81 UIU/ML (ref 0.27–4.2)
VIT B12 BLD-MCNC: 668 PG/ML (ref 211–946)
VLDLC SERPL-MCNC: 26 MG/DL (ref 5–40)
WBC NRBC COR # BLD: 4.14 10*3/MM3 (ref 3.4–10.8)

## 2022-07-13 PROCEDURE — 82607 VITAMIN B-12: CPT | Performed by: FAMILY MEDICINE

## 2022-07-13 PROCEDURE — 36415 COLL VENOUS BLD VENIPUNCTURE: CPT | Performed by: FAMILY MEDICINE

## 2022-07-13 PROCEDURE — 80061 LIPID PANEL: CPT | Performed by: FAMILY MEDICINE

## 2022-07-13 PROCEDURE — 84443 ASSAY THYROID STIM HORMONE: CPT | Performed by: FAMILY MEDICINE

## 2022-07-13 PROCEDURE — 99214 OFFICE O/P EST MOD 30 MIN: CPT | Performed by: FAMILY MEDICINE

## 2022-07-13 PROCEDURE — 86803 HEPATITIS C AB TEST: CPT | Performed by: FAMILY MEDICINE

## 2022-07-13 PROCEDURE — 85025 COMPLETE CBC W/AUTO DIFF WBC: CPT | Performed by: FAMILY MEDICINE

## 2022-07-13 PROCEDURE — 83036 HEMOGLOBIN GLYCOSYLATED A1C: CPT | Performed by: FAMILY MEDICINE

## 2022-07-13 PROCEDURE — 80053 COMPREHEN METABOLIC PANEL: CPT | Performed by: FAMILY MEDICINE

## 2022-07-13 NOTE — PROGRESS NOTES
Chief Complaint  Follow-up (Smashed hand in truck door. )    Subjective        Mark Marie presents to [facility]  He smashed his right hand in a truck door on 7/2/22.  The pain is improving.      Hand Pain   The incident occurred more than 1 week ago. The incident occurred at home. The injury mechanism was a direct blow. The pain is present in the right fingers. The quality of the pain is described as aching. The pain does not radiate. The pain is mild. The pain has been constant since the incident. Pertinent negatives include no chest pain. Nothing aggravates the symptoms.   Fatigue  This is a chronic problem. The current episode started more than 1 year ago. The problem occurs constantly. The problem has been unchanged. Associated symptoms include fatigue. Pertinent negatives include no chest pain, chills, congestion, coughing, diaphoresis, fever, headaches, nausea or swollen glands. Nothing aggravates the symptoms. He has tried nothing for the symptoms.   Hypothyroidism  This is a chronic problem. The current episode started more than 1 year ago. The problem occurs constantly. The problem has been unchanged. Associated symptoms include fatigue. Pertinent negatives include no chest pain, chills, congestion, coughing, diaphoresis, fever, headaches, nausea or swollen glands. Nothing aggravates the symptoms.   Diabetes  He presents for his follow-up diabetic visit. He has type 2 diabetes mellitus. His disease course has been stable. Pertinent negatives for hypoglycemia include no headaches. Associated symptoms include fatigue. Pertinent negatives for diabetes include no chest pain, no polydipsia, no polyphagia and no polyuria. Symptoms are stable. Current diabetic treatment includes diet and oral agent (monotherapy). He is compliant with treatment most of the time. An ACE inhibitor/angiotensin II receptor blocker is being taken. Eye exam is current.   Hyperlipidemia  This is a  "chronic problem. The current episode started more than 1 year ago. The problem is controlled. Exacerbating diseases include hypothyroidism. Pertinent negatives include no chest pain. Current antihyperlipidemic treatment includes statins.       Objective   Vital Signs:  /67 (BP Location: Right arm, Patient Position: Sitting, Cuff Size: Large Adult)   Pulse 77   Temp 97.5 °F (36.4 °C) (Infrared)   Wt 83.5 kg (184 lb)   SpO2 96%   BMI 31.58 kg/m²   Estimated body mass index is 31.58 kg/m² as calculated from the following:    Height as of 6/1/22: 162.6 cm (64\").    Weight as of this encounter: 83.5 kg (184 lb).    BMI is >= 30 and <35. (Class 1 Obesity). The following options were offered after discussion;: exercise counseling/recommendations      Physical Exam  Constitutional:       General: He is not in acute distress.     Appearance: He is well-developed.   HENT:      Head: Normocephalic.   Eyes:      General: Lids are normal.      Conjunctiva/sclera: Conjunctivae normal.   Neck:      Thyroid: No thyroid mass or thyromegaly.      Trachea: Trachea normal.   Cardiovascular:      Rate and Rhythm: Normal rate and regular rhythm.      Heart sounds: Normal heart sounds.   Pulmonary:      Effort: Pulmonary effort is normal.      Breath sounds: Normal breath sounds.   Musculoskeletal:      Right hand: Tenderness present.      Cervical back: Normal range of motion.   Lymphadenopathy:      Cervical: No cervical adenopathy.   Skin:     General: Skin is warm and dry.   Neurological:      Mental Status: He is alert and oriented to person, place, and time.   Psychiatric:         Attention and Perception: He is attentive.         Mood and Affect: Mood normal.         Speech: Speech normal.         Behavior: Behavior normal.        Result Review :  The following data was reviewed by: Diana Linares MD on 07/13/2022:  Common labs    Common Labsle 9/20/21 9/20/21 9/20/21 7/13/22 7/13/22 7/13/22 7/13/22    0854 [id number] 0854 " 1042 1042 1042 1042   Glucose  118 (A)    113 (A)    BUN  21    16    Creatinine  0.95    0.86    eGFR Non African Am  76        Sodium  141    141    Potassium  4.6    4.5    Chloride  107    105    Calcium  9.0    9.0    Albumin  4.10    3.90    Total Bilirubin  0.4    0.3    Alkaline Phosphatase  62    54    AST (SGOT)  13    16    ALT (SGPT)  9    12    WBC     4.14     Hemoglobin     14.9     Hematocrit     45.2     Platelets     143     Total Cholesterol   166    159   Triglycerides   75    148   HDL Cholesterol   57    54   LDL Cholesterol    95    79   Hemoglobin A1C 5.9 (A)   5.8 (A)      (A) Abnormal value                      Assessment and Plan   Diagnoses and all orders for this visit:    1. Crushing injury of right middle finger, initial encounter (Primary)    2. Hypothyroidism, unspecified type  -     TSH    3. 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 Hyperlipidemia, unspecified hyperlipidemia type  -     Lipid Panel    5. Fatigue, unspecified type  -     TSH  -     CBC & Differential  -     Vitamin B12    6. Need for hepatitis C screening test  -     Hepatitis C Antibody; Future    Other orders  -     levothyroxine (SYNTHROID, LEVOTHROID) 25 MCG tablet; Take 1 tablet by mouth Daily.  Dispense: 90 tablet; Refill: 3             Follow Up   No follow-ups on file.  Patient was given instructions and counseling regarding his condition or for health maintenance advice. Please see specific information pulled into the AVS if appropriate.       Answers for HPI/ROS submitted by the patient on 7/8/2022  What is the primary reason for your visit?: Other  Please describe your symptoms.: mashed finger in truck door.   Being tired a lot of the time.  Have you had these symptoms before?: Yes  How long have you been having these symptoms?: 1-4 days  Please list any medications you are currently taking for this  condition.: none  Please describe any probable cause for these symptoms. : mashed finger in truck door on Saturday.  I have been tired for some time now.

## 2022-07-31 RX ORDER — LEVOTHYROXINE SODIUM 0.03 MG/1
25 TABLET ORAL DAILY
Qty: 90 TABLET | Refills: 3
Start: 2022-07-31 | End: 2023-04-03

## 2022-09-23 DIAGNOSIS — G89.29 CHRONIC BILATERAL LOW BACK PAIN WITHOUT SCIATICA: ICD-10-CM

## 2022-09-23 DIAGNOSIS — M54.50 CHRONIC BILATERAL LOW BACK PAIN WITHOUT SCIATICA: ICD-10-CM

## 2022-09-25 RX ORDER — TRAMADOL HYDROCHLORIDE 50 MG/1
TABLET ORAL
Qty: 120 TABLET | Refills: 0 | Status: SHIPPED | OUTPATIENT
Start: 2022-09-25 | End: 2022-12-27

## 2022-10-04 NOTE — PROGRESS NOTES
I have reviewed these labs and will discuss them with the patient during our next appointment. Study complete

## 2022-12-27 DIAGNOSIS — M54.50 CHRONIC BILATERAL LOW BACK PAIN WITHOUT SCIATICA: ICD-10-CM

## 2022-12-27 DIAGNOSIS — G89.29 CHRONIC BILATERAL LOW BACK PAIN WITHOUT SCIATICA: ICD-10-CM

## 2022-12-27 RX ORDER — TRAMADOL HYDROCHLORIDE 50 MG/1
TABLET ORAL
Qty: 120 TABLET | Refills: 0 | Status: SHIPPED | OUTPATIENT
Start: 2022-12-27 | End: 2023-02-14 | Stop reason: SDUPTHER

## 2023-01-30 ENCOUNTER — TELEPHONE (OUTPATIENT)
Dept: FAMILY MEDICINE CLINIC | Facility: CLINIC | Age: 83
End: 2023-01-30

## 2023-01-30 RX ORDER — AMOXICILLIN 500 MG/1
500 CAPSULE ORAL 3 TIMES DAILY
Qty: 30 CAPSULE | Refills: 0 | Status: SHIPPED | OUTPATIENT
Start: 2023-01-30 | End: 2023-02-14

## 2023-01-30 NOTE — TELEPHONE ENCOUNTER
Caller: Mark Marie    Relationship: Self    Best call back number: [phone]     WAS CALLING BACK IN TO CHECK IN ON THIS.      PLEASE GIVE HIM A CALLBACK REGARDING US CALLING HIM IN SOME MEDICATION

## 2023-01-30 NOTE — TELEPHONE ENCOUNTER
Caller: Mark Marie    Relationship: Self    Best call back number:  [phone] (Mobile)      What medication are you requesting: SORE THROAT MEDS     What are your current symptoms: SORE THROAT    How long have you been experiencing symptoms:  2-3 DAYS  GARGLING WITH SALT WATER NOT HELPING       Have you had these symptoms before:    [] Yes  [x] No    Have you been treated for these symptoms before:   [] Yes  [x] No    If a prescription is needed, what is your preferred pharmacy and phone number: [address] [phone] [phone]      Additional notes:

## 2023-02-07 ENCOUNTER — TELEPHONE (OUTPATIENT)
Dept: FAMILY MEDICINE CLINIC | Facility: CLINIC | Age: 83
End: 2023-02-07
Payer: MEDICARE

## 2023-02-07 DIAGNOSIS — E78.5 HYPERLIPIDEMIA, UNSPECIFIED HYPERLIPIDEMIA TYPE: ICD-10-CM

## 2023-02-07 DIAGNOSIS — E03.9 HYPOTHYROIDISM, UNSPECIFIED TYPE: Primary | ICD-10-CM

## 2023-02-07 DIAGNOSIS — E11.8 DISORDER ASSOCIATED WITH TYPE 2 DIABETES MELLITUS: ICD-10-CM

## 2023-02-08 ENCOUNTER — LAB (OUTPATIENT)
Dept: FAMILY MEDICINE CLINIC | Facility: CLINIC | Age: 83
End: 2023-02-08
Payer: MEDICARE

## 2023-02-08 LAB
ALBUMIN SERPL-MCNC: 4.1 G/DL (ref 3.5–5.2)
ALBUMIN/GLOB SERPL: 1.5 G/DL
ALP SERPL-CCNC: 62 U/L (ref 39–117)
ALT SERPL W P-5'-P-CCNC: 13 U/L (ref 1–41)
ANION GAP SERPL CALCULATED.3IONS-SCNC: 8.1 MMOL/L (ref 5–15)
AST SERPL-CCNC: 15 U/L (ref 1–40)
BASOPHILS # BLD AUTO: 0.03 10*3/MM3 (ref 0–0.2)
BASOPHILS NFR BLD AUTO: 0.6 % (ref 0–1.5)
BILIRUB SERPL-MCNC: 0.4 MG/DL (ref 0–1.2)
BUN SERPL-MCNC: 13 MG/DL (ref 8–23)
BUN/CREAT SERPL: 15.3 (ref 7–25)
CALCIUM SPEC-SCNC: 9.8 MG/DL (ref 8.6–10.5)
CHLORIDE SERPL-SCNC: 102 MMOL/L (ref 98–107)
CHOLEST SERPL-MCNC: 195 MG/DL (ref 0–200)
CO2 SERPL-SCNC: 29.9 MMOL/L (ref 22–29)
CREAT SERPL-MCNC: 0.85 MG/DL (ref 0.76–1.27)
DEPRECATED RDW RBC AUTO: 41.2 FL (ref 37–54)
EGFRCR SERPLBLD CKD-EPI 2021: 87.8 ML/MIN/1.73
EOSINOPHIL # BLD AUTO: 0.5 10*3/MM3 (ref 0–0.4)
EOSINOPHIL NFR BLD AUTO: 10 % (ref 0.3–6.2)
ERYTHROCYTE [DISTWIDTH] IN BLOOD BY AUTOMATED COUNT: 12.3 % (ref 12.3–15.4)
GLOBULIN UR ELPH-MCNC: 2.7 GM/DL
GLUCOSE SERPL-MCNC: 102 MG/DL (ref 65–99)
HBA1C MFR BLD: 5.6 % (ref 3.5–5.6)
HCT VFR BLD AUTO: 44.1 % (ref 37.5–51)
HDLC SERPL-MCNC: 63 MG/DL (ref 40–60)
HGB BLD-MCNC: 15.2 G/DL (ref 13–17.7)
IMM GRANULOCYTES # BLD AUTO: 0.01 10*3/MM3 (ref 0–0.05)
IMM GRANULOCYTES NFR BLD AUTO: 0.2 % (ref 0–0.5)
LDLC SERPL CALC-MCNC: 106 MG/DL (ref 0–100)
LDLC/HDLC SERPL: 1.62 {RATIO}
LYMPHOCYTES # BLD AUTO: 0.5 10*3/MM3 (ref 0.7–3.1)
LYMPHOCYTES NFR BLD AUTO: 10 % (ref 19.6–45.3)
MCH RBC QN AUTO: 31.5 PG (ref 26.6–33)
MCHC RBC AUTO-ENTMCNC: 34.5 G/DL (ref 31.5–35.7)
MCV RBC AUTO: 91.5 FL (ref 79–97)
MONOCYTES # BLD AUTO: 0.48 10*3/MM3 (ref 0.1–0.9)
MONOCYTES NFR BLD AUTO: 9.6 % (ref 5–12)
NEUTROPHILS NFR BLD AUTO: 3.49 10*3/MM3 (ref 1.7–7)
NEUTROPHILS NFR BLD AUTO: 69.6 % (ref 42.7–76)
NRBC BLD AUTO-RTO: 0 /100 WBC (ref 0–0.2)
PLATELET # BLD AUTO: 264 10*3/MM3 (ref 140–450)
PMV BLD AUTO: 9.1 FL (ref 6–12)
POTASSIUM SERPL-SCNC: 4.6 MMOL/L (ref 3.5–5.2)
PROT SERPL-MCNC: 6.8 G/DL (ref 6–8.5)
RBC # BLD AUTO: 4.82 10*6/MM3 (ref 4.14–5.8)
SODIUM SERPL-SCNC: 140 MMOL/L (ref 136–145)
TRIGL SERPL-MCNC: 151 MG/DL (ref 0–150)
TSH SERPL DL<=0.05 MIU/L-ACNC: 3.2 UIU/ML (ref 0.27–4.2)
VLDLC SERPL-MCNC: 26 MG/DL (ref 5–40)
WBC NRBC COR # BLD: 5.01 10*3/MM3 (ref 3.4–10.8)

## 2023-02-08 PROCEDURE — 84443 ASSAY THYROID STIM HORMONE: CPT | Performed by: FAMILY MEDICINE

## 2023-02-08 PROCEDURE — 36415 COLL VENOUS BLD VENIPUNCTURE: CPT | Performed by: FAMILY MEDICINE

## 2023-02-08 PROCEDURE — 80061 LIPID PANEL: CPT | Performed by: FAMILY MEDICINE

## 2023-02-08 PROCEDURE — 83036 HEMOGLOBIN GLYCOSYLATED A1C: CPT | Performed by: FAMILY MEDICINE

## 2023-02-08 PROCEDURE — 80053 COMPREHEN METABOLIC PANEL: CPT | Performed by: FAMILY MEDICINE

## 2023-02-08 PROCEDURE — 85025 COMPLETE CBC W/AUTO DIFF WBC: CPT | Performed by: FAMILY MEDICINE

## 2023-02-14 ENCOUNTER — OFFICE VISIT (OUTPATIENT)
Dept: FAMILY MEDICINE CLINIC | Facility: CLINIC | Age: 83
End: 2023-02-14
Payer: MEDICARE

## 2023-02-14 VITALS
TEMPERATURE: 97.8 F | DIASTOLIC BLOOD PRESSURE: 73 MMHG | HEIGHT: 64 IN | HEART RATE: 76 BPM | SYSTOLIC BLOOD PRESSURE: 124 MMHG | WEIGHT: 200.1 LBS | OXYGEN SATURATION: 96 % | BODY MASS INDEX: 34.16 KG/M2

## 2023-02-14 DIAGNOSIS — Z00.00 MEDICARE ANNUAL WELLNESS VISIT, SUBSEQUENT: Primary | ICD-10-CM

## 2023-02-14 DIAGNOSIS — G89.29 CHRONIC BILATERAL LOW BACK PAIN WITHOUT SCIATICA: ICD-10-CM

## 2023-02-14 DIAGNOSIS — M54.50 CHRONIC BILATERAL LOW BACK PAIN WITHOUT SCIATICA: ICD-10-CM

## 2023-02-14 DIAGNOSIS — Z23 NEED FOR VACCINATION: ICD-10-CM

## 2023-02-14 PROCEDURE — G0009 ADMIN PNEUMOCOCCAL VACCINE: HCPCS | Performed by: FAMILY MEDICINE

## 2023-02-14 PROCEDURE — G0439 PPPS, SUBSEQ VISIT: HCPCS | Performed by: FAMILY MEDICINE

## 2023-02-14 PROCEDURE — 1159F MED LIST DOCD IN RCRD: CPT | Performed by: FAMILY MEDICINE

## 2023-02-14 PROCEDURE — 1125F AMNT PAIN NOTED PAIN PRSNT: CPT | Performed by: FAMILY MEDICINE

## 2023-02-14 PROCEDURE — 1160F RVW MEDS BY RX/DR IN RCRD: CPT | Performed by: FAMILY MEDICINE

## 2023-02-14 PROCEDURE — 1170F FXNL STATUS ASSESSED: CPT | Performed by: FAMILY MEDICINE

## 2023-02-14 PROCEDURE — 1126F AMNT PAIN NOTED NONE PRSNT: CPT | Performed by: FAMILY MEDICINE

## 2023-02-14 RX ORDER — TRAMADOL HYDROCHLORIDE 50 MG/1
50 TABLET ORAL EVERY 6 HOURS PRN
Qty: 120 TABLET | Refills: 0 | Status: SHIPPED | OUTPATIENT
Start: 2023-02-14 | End: 2023-04-03

## 2023-02-14 NOTE — PROGRESS NOTES
The ABCs of the Annual Wellness Visit  Subsequent Medicare Wellness Visit    Subjective      Mark Marie is a 82 y.o. male who presents for a Subsequent Medicare Wellness Visit.    The following portions of the patient's history were reviewed and   updated as appropriate: allergies, current medications, past family history, past medical history, past social history, past surgical history and problem list.    Compared to one year ago, the patient feels his physical   health is the same.    Compared to one year ago, the patient feels his mental   health is the same.    Recent Hospitalizations:  He was not admitted to the hospital during the last year.       Current Medical Providers:  Patient Care Team:  Diana Linares MD as PCP - General (Family Medicine)  Abhijit Jones MD as Consulting Physician (Urology)    Outpatient Medications Prior to Visit   Medication Sig Dispense Refill   • donepezil (ARICEPT) 5 MG tablet Take 1 tablet by mouth Every Night. 90 tablet 3   • HYDROcodone-acetaminophen (NORCO) 5-325 MG per tablet      • levothyroxine (SYNTHROID, LEVOTHROID) 25 MCG tablet Take 1 tablet by mouth Daily. 90 tablet 3   • losartan (COZAAR) 50 MG tablet Take 1 tablet by mouth Daily. 90 tablet 3   • metFORMIN (GLUCOPHAGE) 500 MG tablet Take 1 tablet by mouth 2 (Two) Times a Day. 180 tablet 3   • simvastatin (ZOCOR) 80 MG tablet Take 0.5 tablets by mouth Daily. 90 tablet 3   • tamsulosin (FLOMAX) 0.4 MG capsule 24 hr capsule      • traMADol (ULTRAM) 50 MG tablet TAKE 1 TABLET BY MOUTH EVERY 6 HOURS AS NEEDED FOR PAIN (MODERATE PAIN) 120 tablet 0   • amoxicillin (AMOXIL) 500 MG capsule Take 1 capsule by mouth 3 (Three) Times a Day. 30 capsule 0     No facility-administered medications prior to visit.       Opioid medication/s are on active medication list.  and I have evaluated his active treatment plan and pain score trends (see table).  There were no vitals filed for this visit.  I have reviewed the chart for  "potential of high risk medication and harmful drug interactions in the elderly.            Aspirin is not on active medication list.  Aspirin use is not indicated based on review of current medical condition/s. Risk of harm outweighs potential benefits.  .    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  ACP discussion was held with the patient during this visit. Patient has an advance directive in EMR which is still valid.      Objective    Vitals:    02/14/23 1332   BP: 124/73   BP Location: Left arm   Patient Position: Sitting   Cuff Size: Adult   Pulse: 76   Temp: 97.8 °F (36.6 °C)   TempSrc: Infrared   SpO2: 96%   Weight: 90.8 kg (200 lb 1.6 oz)   Height: 162.6 cm (64\")     Estimated body mass index is 34.35 kg/m² as calculated from the following:    Height as of this encounter: 162.6 cm (64\").    Weight as of this encounter: 90.8 kg (200 lb 1.6 oz).    BMI is >= 30 and <35. (Class 1 Obesity). The following options were offered after discussion;: exercise counseling/recommendations    Physical Exam  Constitutional:       General: He is not in acute distress.     Appearance: He is well-developed.   HENT:      Head: Normocephalic.   Eyes:      General: Lids are normal.      Conjunctiva/sclera: Conjunctivae normal.   Neck:      Thyroid: No thyroid mass or thyromegaly.      Trachea: Trachea normal.   Cardiovascular:      Rate and Rhythm: Normal rate and regular rhythm.      Heart sounds: Normal heart sounds. "   Pulmonary:      Effort: Pulmonary effort is normal.      Breath sounds: Normal breath sounds.   Abdominal:      Palpations: Abdomen is soft.   Musculoskeletal:      Cervical back: Normal range of motion.      Right lower leg: No edema.      Left lower leg: No edema.   Lymphadenopathy:      Cervical: No cervical adenopathy.   Skin:     General: Skin is warm and dry.   Neurological:      Mental Status: He is alert and oriented to person, place, and time.   Psychiatric:         Attention and Perception: He is attentive.         Mood and Affect: Mood normal.         Speech: Speech normal.         Behavior: Behavior normal.         Does the patient have evidence of cognitive impairment?   No    Lab Results   Component Value Date    TRIG 151 (H) 02/08/2023    HDL 63 (H) 02/08/2023     (H) 02/08/2023    VLDL 26 02/08/2023    HGBA1C 5.6 02/08/2023          HEALTH RISK ASSESSMENT    Smoking Status:  Social History     Tobacco Use   Smoking Status Never   Smokeless Tobacco Never     Alcohol Consumption:  Social History     Substance and Sexual Activity   Alcohol Use Not Currently    Comment: rarely;  caffeine 1c qd     Fall Risk Screen:    [facility], and patient is at LOW risk for falls.Assessment completed on:2/14/2023    Depression Screening:  PHQ-2/PHQ-9 Depression Screening 2/14/2023   Little Interest or Pleasure in Doing Things 0-->not at all   Feeling Down, Depressed or Hopeless 0-->not at all   PHQ-9: Brief Depression Severity Measure Score 0       Health Habits and Functional and Cognitive Screening:  Functional & Cognitive Status 2/14/2023   Do you have difficulty preparing food and eating? No   Do you have difficulty bathing yourself, getting dressed or grooming yourself? No   Do you have difficulty using the toilet? No   Do you have difficulty moving around from place to place? No   Do you have trouble with steps or getting out of a bed or a chair? No   Current Diet Well  Balanced Diet   Dental Exam Up to date   Eye Exam Up to date   Exercise (times per week) 2 times per week   Current Exercises Include Gardening   Current Exercise Activities Include -   Do you need help using the phone?  No   Are you deaf or do you have serious difficulty hearing?  No   Do you need help with transportation? No   Do you need help shopping? No   Do you need help preparing meals?  No   Do you need help with housework?  No   Do you need help with laundry? No   Do you need help taking your medications? No   Do you need help managing money? -   Do you ever drive or ride in a car without wearing a seat belt? No   Have you felt unusual stress, anger or loneliness in the last month? No   Who do you live with? Spouse   If you need help, do you have trouble finding someone available to you? No   Do you have difficulty concentrating, remembering or making decisions? No       Age-appropriate Screening Schedule:  Refer to the list below for future screening recommendations based on patient's age, sex and/or medical conditions. Orders for these recommended tests are listed in the plan section. The patient has been provided with a written plan.    Health Maintenance   Topic Date Due   • TDAP/TD VACCINES (1 - Tdap) Never done   • ZOSTER VACCINE (1 of 2) Never done   • DIABETIC EYE EXAM  09/03/2019   • URINE MICROALBUMIN  07/27/2021   • HEMOGLOBIN A1C  08/08/2023   • LIPID PANEL  02/08/2024   • INFLUENZA VACCINE  Completed                CMS Preventative Services Quick Reference  Risk Factors Identified During Encounter:    Dental Screening Recommended  Vision Screening Recommended    The above risks/problems have been discussed with the patient.  Pertinent information has been shared with the patient in the After Visit Summary.    Diagnoses and all orders for this visit:    1. Medicare annual wellness visit, subsequent (Primary)    2. Chronic bilateral low back pain without sciatica  -     traMADol (ULTRAM) 50 MG  tablet; Take 1 tablet by mouth Every 6 (Six) Hours As Needed for Moderate Pain.  Dispense: 120 tablet; Refill: 0        Follow Up:   Next Medicare Wellness visit to be scheduled in 1 year.      An After Visit Summary and PPPS were made available to the patient.

## 2023-02-15 PROCEDURE — 90677 PCV20 VACCINE IM: CPT | Performed by: FAMILY MEDICINE

## 2023-03-25 RX ORDER — LOSARTAN POTASSIUM 50 MG/1
TABLET ORAL
Qty: 90 TABLET | Refills: 3 | Status: SHIPPED | OUTPATIENT
Start: 2023-03-25

## 2023-04-03 DIAGNOSIS — R41.3 MEMORY CHANGE: ICD-10-CM

## 2023-04-03 DIAGNOSIS — G89.29 CHRONIC BILATERAL LOW BACK PAIN WITHOUT SCIATICA: ICD-10-CM

## 2023-04-03 DIAGNOSIS — M54.50 CHRONIC BILATERAL LOW BACK PAIN WITHOUT SCIATICA: ICD-10-CM

## 2023-04-03 RX ORDER — DONEPEZIL HYDROCHLORIDE 5 MG/1
TABLET, FILM COATED ORAL
Qty: 90 TABLET | Refills: 3 | Status: SHIPPED | OUTPATIENT
Start: 2023-04-03

## 2023-04-03 RX ORDER — LEVOTHYROXINE SODIUM 0.03 MG/1
TABLET ORAL
Qty: 90 TABLET | Refills: 3 | Status: SHIPPED | OUTPATIENT
Start: 2023-04-03

## 2023-04-03 RX ORDER — TRAMADOL HYDROCHLORIDE 50 MG/1
TABLET ORAL
Qty: 120 TABLET | Refills: 0 | Status: SHIPPED | OUTPATIENT
Start: 2023-04-03

## 2023-04-03 RX ORDER — SIMVASTATIN 80 MG
TABLET ORAL
Qty: 45 TABLET | Refills: 7 | Status: SHIPPED | OUTPATIENT
Start: 2023-04-03

## 2023-06-07 ENCOUNTER — APPOINTMENT (OUTPATIENT)
Dept: GENERAL RADIOLOGY | Facility: HOSPITAL | Age: 83
DRG: 872 | End: 2023-06-07
Payer: MEDICARE

## 2023-06-07 ENCOUNTER — APPOINTMENT (OUTPATIENT)
Dept: CT IMAGING | Facility: HOSPITAL | Age: 83
DRG: 872 | End: 2023-06-07
Payer: MEDICARE

## 2023-06-07 ENCOUNTER — OFFICE VISIT (OUTPATIENT)
Dept: FAMILY MEDICINE CLINIC | Facility: CLINIC | Age: 83
End: 2023-06-07
Payer: MEDICARE

## 2023-06-07 ENCOUNTER — HOSPITAL ENCOUNTER (INPATIENT)
Facility: HOSPITAL | Age: 83
LOS: 4 days | Discharge: NURSING, REHAB OR LONG-TERM CARE | DRG: 872 | End: 2023-06-12
Attending: EMERGENCY MEDICINE | Admitting: FAMILY MEDICINE
Payer: MEDICARE

## 2023-06-07 VITALS
DIASTOLIC BLOOD PRESSURE: 72 MMHG | HEART RATE: 108 BPM | TEMPERATURE: 103.1 F | SYSTOLIC BLOOD PRESSURE: 148 MMHG | OXYGEN SATURATION: 93 %

## 2023-06-07 DIAGNOSIS — D69.6 THROMBOCYTOPENIA: ICD-10-CM

## 2023-06-07 DIAGNOSIS — E86.0 DEHYDRATION: ICD-10-CM

## 2023-06-07 DIAGNOSIS — R50.81 FEVER IN OTHER DISEASES: Primary | ICD-10-CM

## 2023-06-07 DIAGNOSIS — N17.9 ACUTE KIDNEY INJURY: ICD-10-CM

## 2023-06-07 DIAGNOSIS — A41.9 SEPSIS, DUE TO UNSPECIFIED ORGANISM, UNSPECIFIED WHETHER ACUTE ORGAN DYSFUNCTION PRESENT: Primary | ICD-10-CM

## 2023-06-07 DIAGNOSIS — R41.0 CONFUSION: ICD-10-CM

## 2023-06-07 DIAGNOSIS — D72.825 BANDEMIA: ICD-10-CM

## 2023-06-07 DIAGNOSIS — J18.9 PNEUMONIA DUE TO INFECTIOUS ORGANISM, UNSPECIFIED LATERALITY, UNSPECIFIED PART OF LUNG: ICD-10-CM

## 2023-06-07 DIAGNOSIS — R06.00 DYSPNEA, UNSPECIFIED TYPE: ICD-10-CM

## 2023-06-07 PROBLEM — R50.9 FEBRILE ILLNESS: Status: ACTIVE | Noted: 2023-06-07

## 2023-06-07 PROBLEM — R50.9 FEVER: Status: ACTIVE | Noted: 2023-06-07

## 2023-06-07 PROBLEM — R74.01 ELEVATED LIVER TRANSAMINASE LEVEL: Status: ACTIVE | Noted: 2023-06-07

## 2023-06-07 PROBLEM — R13.10 DYSPHAGIA: Status: ACTIVE | Noted: 2023-06-07

## 2023-06-07 PROBLEM — Z85.46 HISTORY OF PROSTATE CANCER: Status: ACTIVE | Noted: 2023-06-07

## 2023-06-07 PROBLEM — R50.9 FEVER: Status: RESOLVED | Noted: 2023-06-07 | Resolved: 2023-06-07

## 2023-06-07 LAB
ALBUMIN SERPL-MCNC: 3.7 G/DL (ref 3.5–5.2)
ALBUMIN/GLOB SERPL: 1.3 G/DL
ALP SERPL-CCNC: 56 U/L (ref 39–117)
ALT SERPL W P-5'-P-CCNC: 53 U/L (ref 1–41)
ANION GAP SERPL CALCULATED.3IONS-SCNC: 12 MMOL/L (ref 5–15)
AST SERPL-CCNC: 222 U/L (ref 1–40)
B PARAPERT DNA SPEC QL NAA+PROBE: NOT DETECTED
B PERT DNA SPEC QL NAA+PROBE: NOT DETECTED
BACTERIA UR QL AUTO: ABNORMAL /HPF
BILIRUB SERPL-MCNC: 0.5 MG/DL (ref 0–1.2)
BILIRUB UR QL STRIP: ABNORMAL
BUN SERPL-MCNC: 27 MG/DL (ref 8–23)
BUN/CREAT SERPL: 20.9 (ref 7–25)
C PNEUM DNA NPH QL NAA+NON-PROBE: NOT DETECTED
CALCIUM SPEC-SCNC: 8.6 MG/DL (ref 8.6–10.5)
CHLORIDE SERPL-SCNC: 97 MMOL/L (ref 98–107)
CLARITY UR: CLEAR
CO2 SERPL-SCNC: 26 MMOL/L (ref 22–29)
COLOR UR: ABNORMAL
CREAT SERPL-MCNC: 1.29 MG/DL (ref 0.76–1.27)
D-LACTATE SERPL-SCNC: 1 MMOL/L (ref 0.3–2)
DEPRECATED RDW RBC AUTO: 45.5 FL (ref 37–54)
EGFRCR SERPLBLD CKD-EPI 2021: 56.1 ML/MIN/1.73
ERYTHROCYTE [DISTWIDTH] IN BLOOD BY AUTOMATED COUNT: 13.5 % (ref 12.3–15.4)
FLUAV SUBTYP SPEC NAA+PROBE: NOT DETECTED
FLUBV RNA ISLT QL NAA+PROBE: NOT DETECTED
GLOBULIN UR ELPH-MCNC: 2.9 GM/DL
GLUCOSE BLDC GLUCOMTR-MCNC: 105 MG/DL (ref 70–105)
GLUCOSE SERPL-MCNC: 124 MG/DL (ref 65–99)
GLUCOSE UR STRIP-MCNC: NEGATIVE MG/DL
GRAN CASTS URNS QL MICRO: ABNORMAL /LPF
HADV DNA SPEC NAA+PROBE: NOT DETECTED
HCOV 229E RNA SPEC QL NAA+PROBE: NOT DETECTED
HCOV HKU1 RNA SPEC QL NAA+PROBE: NOT DETECTED
HCOV NL63 RNA SPEC QL NAA+PROBE: NOT DETECTED
HCOV OC43 RNA SPEC QL NAA+PROBE: NOT DETECTED
HCT VFR BLD AUTO: 44.1 % (ref 37.5–51)
HGB BLD-MCNC: 15 G/DL (ref 13–17.7)
HGB UR QL STRIP.AUTO: ABNORMAL
HMPV RNA NPH QL NAA+NON-PROBE: NOT DETECTED
HOLD SPECIMEN: NORMAL
HPIV1 RNA ISLT QL NAA+PROBE: NOT DETECTED
HPIV2 RNA SPEC QL NAA+PROBE: NOT DETECTED
HPIV3 RNA NPH QL NAA+PROBE: NOT DETECTED
HPIV4 P GENE NPH QL NAA+PROBE: NOT DETECTED
HYALINE CASTS UR QL AUTO: ABNORMAL /LPF
INR PPP: 1.13 (ref 0.93–1.1)
KETONES UR QL STRIP: ABNORMAL
LARGE PLATELETS: NORMAL
LEUKOCYTE ESTERASE UR QL STRIP.AUTO: ABNORMAL
LYMPHOCYTES # BLD MANUAL: 0.23 10*3/MM3 (ref 0.7–3.1)
LYMPHOCYTES NFR BLD MANUAL: 10 % (ref 5–12)
M PNEUMO IGG SER IA-ACNC: NOT DETECTED
MCH RBC QN AUTO: 31.2 PG (ref 26.6–33)
MCHC RBC AUTO-ENTMCNC: 33.9 G/DL (ref 31.5–35.7)
MCV RBC AUTO: 91.8 FL (ref 79–97)
METAMYELOCYTES NFR BLD MANUAL: 1 % (ref 0–0)
MONOCYTES # BLD: 0.39 10*3/MM3 (ref 0.1–0.9)
NEUTROPHILS # BLD AUTO: 3.2 10*3/MM3 (ref 1.7–7)
NEUTROPHILS NFR BLD MANUAL: 60 % (ref 42.7–76)
NEUTS BAND NFR BLD MANUAL: 22 % (ref 0–5)
NEUTS VAC BLD QL SMEAR: NORMAL
NITRITE UR QL STRIP: NEGATIVE
PH UR STRIP.AUTO: 5.5 [PH] (ref 5–8)
PLASMA CELL PREC NFR BLD MANUAL: 1 % (ref 0–0)
PLAT MORPH BLD: NORMAL
PLATELET # BLD AUTO: 43 10*3/MM3 (ref 140–450)
PMV BLD AUTO: 8.7 FL (ref 6–12)
POTASSIUM SERPL-SCNC: 3.8 MMOL/L (ref 3.5–5.2)
PROT SERPL-MCNC: 6.6 G/DL (ref 6–8.5)
PROT UR QL STRIP: ABNORMAL
PROTHROMBIN TIME: 12 SECONDS (ref 9.6–11.7)
RBC # BLD AUTO: 4.8 10*6/MM3 (ref 4.14–5.8)
RBC # UR STRIP: ABNORMAL /HPF
RBC MORPH BLD: NORMAL
REF LAB TEST METHOD: ABNORMAL
RHINOVIRUS RNA SPEC NAA+PROBE: NOT DETECTED
RSV RNA NPH QL NAA+NON-PROBE: NOT DETECTED
SARS-COV-2 RNA NPH QL NAA+NON-PROBE: NOT DETECTED
SCAN SLIDE: NORMAL
SMALL PLATELETS BLD QL SMEAR: NORMAL
SODIUM SERPL-SCNC: 135 MMOL/L (ref 136–145)
SP GR UR STRIP: 1.02 (ref 1–1.03)
SQUAMOUS #/AREA URNS HPF: ABNORMAL /HPF
TOXIC GRANULATION: NORMAL
UROBILINOGEN UR QL STRIP: ABNORMAL
VARIANT LYMPHS NFR BLD MANUAL: 1 % (ref 0–5)
VARIANT LYMPHS NFR BLD MANUAL: 5 % (ref 19.6–45.3)
WBC # UR STRIP: ABNORMAL /HPF
WBC MORPH BLD: NORMAL
WBC NRBC COR # BLD: 3.9 10*3/MM3 (ref 3.4–10.8)
WHOLE BLOOD HOLD COAG: NORMAL
WHOLE BLOOD HOLD SPECIMEN: NORMAL

## 2023-06-07 PROCEDURE — 99213 OFFICE O/P EST LOW 20 MIN: CPT | Performed by: FAMILY MEDICINE

## 2023-06-07 PROCEDURE — P9612 CATHETERIZE FOR URINE SPEC: HCPCS

## 2023-06-07 PROCEDURE — 82077 ASSAY SPEC XCP UR&BREATH IA: CPT | Performed by: NURSE PRACTITIONER

## 2023-06-07 PROCEDURE — 86663 EPSTEIN-BARR ANTIBODY: CPT | Performed by: NURSE PRACTITIONER

## 2023-06-07 PROCEDURE — 0202U NFCT DS 22 TRGT SARS-COV-2: CPT | Performed by: EMERGENCY MEDICINE

## 2023-06-07 PROCEDURE — 3078F DIAST BP <80 MM HG: CPT | Performed by: FAMILY MEDICINE

## 2023-06-07 PROCEDURE — 85025 COMPLETE CBC W/AUTO DIFF WBC: CPT | Performed by: EMERGENCY MEDICINE

## 2023-06-07 PROCEDURE — 3077F SYST BP >= 140 MM HG: CPT | Performed by: FAMILY MEDICINE

## 2023-06-07 PROCEDURE — 25010000002 VANCOMYCIN 10 G RECONSTITUTED SOLUTION: Performed by: EMERGENCY MEDICINE

## 2023-06-07 PROCEDURE — 94761 N-INVAS EAR/PLS OXIMETRY MLT: CPT

## 2023-06-07 PROCEDURE — 83605 ASSAY OF LACTIC ACID: CPT

## 2023-06-07 PROCEDURE — 86664 EPSTEIN-BARR NUCLEAR ANTIGEN: CPT | Performed by: NURSE PRACTITIONER

## 2023-06-07 PROCEDURE — 86644 CMV ANTIBODY: CPT | Performed by: NURSE PRACTITIONER

## 2023-06-07 PROCEDURE — 83615 LACTATE (LD) (LDH) ENZYME: CPT | Performed by: NURSE PRACTITIONER

## 2023-06-07 PROCEDURE — 25010000002 PIPERACILLIN SOD-TAZOBACTAM PER 1 G: Performed by: NURSE PRACTITIONER

## 2023-06-07 PROCEDURE — G0378 HOSPITAL OBSERVATION PER HR: HCPCS

## 2023-06-07 PROCEDURE — 86645 CMV ANTIBODY IGM: CPT | Performed by: NURSE PRACTITIONER

## 2023-06-07 PROCEDURE — 87040 BLOOD CULTURE FOR BACTERIA: CPT | Performed by: EMERGENCY MEDICINE

## 2023-06-07 PROCEDURE — 87799 DETECT AGENT NOS DNA QUANT: CPT | Performed by: NURSE PRACTITIONER

## 2023-06-07 PROCEDURE — 80053 COMPREHEN METABOLIC PANEL: CPT | Performed by: NURSE PRACTITIONER

## 2023-06-07 PROCEDURE — 70450 CT HEAD/BRAIN W/O DYE: CPT

## 2023-06-07 PROCEDURE — 81001 URINALYSIS AUTO W/SCOPE: CPT | Performed by: EMERGENCY MEDICINE

## 2023-06-07 PROCEDURE — 86665 EPSTEIN-BARR CAPSID VCA: CPT | Performed by: NURSE PRACTITIONER

## 2023-06-07 PROCEDURE — 74176 CT ABD & PELVIS W/O CONTRAST: CPT

## 2023-06-07 PROCEDURE — 85025 COMPLETE CBC W/AUTO DIFF WBC: CPT | Performed by: NURSE PRACTITIONER

## 2023-06-07 PROCEDURE — 94640 AIRWAY INHALATION TREATMENT: CPT

## 2023-06-07 PROCEDURE — 85045 AUTOMATED RETICULOCYTE COUNT: CPT | Performed by: NURSE PRACTITIONER

## 2023-06-07 PROCEDURE — 0 CEFEPIME PER 500 MG: Performed by: EMERGENCY MEDICINE

## 2023-06-07 PROCEDURE — 36415 COLL VENOUS BLD VENIPUNCTURE: CPT | Performed by: NURSE PRACTITIONER

## 2023-06-07 PROCEDURE — 86757 RICKETTSIA ANTIBODY: CPT | Performed by: NURSE PRACTITIONER

## 2023-06-07 PROCEDURE — 80053 COMPREHEN METABOLIC PANEL: CPT | Performed by: EMERGENCY MEDICINE

## 2023-06-07 PROCEDURE — 94799 UNLISTED PULMONARY SVC/PX: CPT

## 2023-06-07 PROCEDURE — 85610 PROTHROMBIN TIME: CPT | Performed by: NURSE PRACTITIONER

## 2023-06-07 PROCEDURE — 87468 ANAPLSMA PHGCYTOPHLM AMP PRB: CPT | Performed by: NURSE PRACTITIONER

## 2023-06-07 PROCEDURE — 87484 EHRLICHA CHAFFEENSIS AMP PRB: CPT | Performed by: NURSE PRACTITIONER

## 2023-06-07 PROCEDURE — 82948 REAGENT STRIP/BLOOD GLUCOSE: CPT

## 2023-06-07 PROCEDURE — 99285 EMERGENCY DEPT VISIT HI MDM: CPT

## 2023-06-07 PROCEDURE — 85007 BL SMEAR W/DIFF WBC COUNT: CPT | Performed by: EMERGENCY MEDICINE

## 2023-06-07 PROCEDURE — 71045 X-RAY EXAM CHEST 1 VIEW: CPT

## 2023-06-07 PROCEDURE — 86618 LYME DISEASE ANTIBODY: CPT | Performed by: NURSE PRACTITIONER

## 2023-06-07 PROCEDURE — 85007 BL SMEAR W/DIFF WBC COUNT: CPT | Performed by: NURSE PRACTITIONER

## 2023-06-07 RX ORDER — SODIUM CHLORIDE 9 MG/ML
40 INJECTION, SOLUTION INTRAVENOUS AS NEEDED
Status: DISCONTINUED | OUTPATIENT
Start: 2023-06-07 | End: 2023-06-12 | Stop reason: HOSPADM

## 2023-06-07 RX ORDER — IBUPROFEN 600 MG/1
1 TABLET ORAL
Status: DISCONTINUED | OUTPATIENT
Start: 2023-06-07 | End: 2023-06-12 | Stop reason: HOSPADM

## 2023-06-07 RX ORDER — SODIUM CHLORIDE 9 MG/ML
100 INJECTION, SOLUTION INTRAVENOUS CONTINUOUS
Status: DISCONTINUED | OUTPATIENT
Start: 2023-06-07 | End: 2023-06-11

## 2023-06-07 RX ORDER — PROMETHAZINE HYDROCHLORIDE 25 MG/1
25 TABLET ORAL EVERY 6 HOURS PRN
COMMUNITY

## 2023-06-07 RX ORDER — INSULIN LISPRO 100 [IU]/ML
2-7 INJECTION, SOLUTION INTRAVENOUS; SUBCUTANEOUS
Status: DISCONTINUED | OUTPATIENT
Start: 2023-06-07 | End: 2023-06-12 | Stop reason: HOSPADM

## 2023-06-07 RX ORDER — ACETAMINOPHEN 160 MG/5ML
650 SOLUTION ORAL ONCE
Status: DISCONTINUED | OUTPATIENT
Start: 2023-06-07 | End: 2023-06-08

## 2023-06-07 RX ORDER — ONDANSETRON 2 MG/ML
4 INJECTION INTRAMUSCULAR; INTRAVENOUS EVERY 6 HOURS PRN
Status: DISCONTINUED | OUTPATIENT
Start: 2023-06-07 | End: 2023-06-10

## 2023-06-07 RX ORDER — ACETAMINOPHEN 650 MG/1
650 SUPPOSITORY RECTAL ONCE
Status: DISCONTINUED | OUTPATIENT
Start: 2023-06-07 | End: 2023-06-07

## 2023-06-07 RX ORDER — SODIUM CHLORIDE 0.9 % (FLUSH) 0.9 %
10 SYRINGE (ML) INJECTION EVERY 12 HOURS SCHEDULED
Status: DISCONTINUED | OUTPATIENT
Start: 2023-06-07 | End: 2023-06-12 | Stop reason: HOSPADM

## 2023-06-07 RX ORDER — DEXTROSE MONOHYDRATE 25 G/50ML
25 INJECTION, SOLUTION INTRAVENOUS
Status: DISCONTINUED | OUTPATIENT
Start: 2023-06-07 | End: 2023-06-12 | Stop reason: HOSPADM

## 2023-06-07 RX ORDER — SODIUM CHLORIDE 0.9 % (FLUSH) 0.9 %
10 SYRINGE (ML) INJECTION AS NEEDED
Status: DISCONTINUED | OUTPATIENT
Start: 2023-06-07 | End: 2023-06-12 | Stop reason: HOSPADM

## 2023-06-07 RX ORDER — ACETAMINOPHEN 650 MG/1
650 SUPPOSITORY RECTAL ONCE
Status: COMPLETED | OUTPATIENT
Start: 2023-06-07 | End: 2023-06-07

## 2023-06-07 RX ORDER — POLYETHYLENE GLYCOL 3350 17 G/17G
17 POWDER, FOR SOLUTION ORAL 2 TIMES DAILY PRN
Status: DISCONTINUED | OUTPATIENT
Start: 2023-06-07 | End: 2023-06-12 | Stop reason: HOSPADM

## 2023-06-07 RX ORDER — HEPARIN SODIUM 5000 [USP'U]/ML
5000 INJECTION, SOLUTION INTRAVENOUS; SUBCUTANEOUS EVERY 12 HOURS SCHEDULED
Status: DISCONTINUED | OUTPATIENT
Start: 2023-06-07 | End: 2023-06-07

## 2023-06-07 RX ORDER — IPRATROPIUM BROMIDE AND ALBUTEROL SULFATE 2.5; .5 MG/3ML; MG/3ML
3 SOLUTION RESPIRATORY (INHALATION) ONCE
Status: COMPLETED | OUTPATIENT
Start: 2023-06-07 | End: 2023-06-07

## 2023-06-07 RX ORDER — NICOTINE POLACRILEX 4 MG
15 LOZENGE BUCCAL
Status: DISCONTINUED | OUTPATIENT
Start: 2023-06-07 | End: 2023-06-12 | Stop reason: HOSPADM

## 2023-06-07 RX ADMIN — Medication 10 ML: at 21:56

## 2023-06-07 RX ADMIN — DOXYCYCLINE 100 MG: 100 INJECTION, POWDER, LYOPHILIZED, FOR SOLUTION INTRAVENOUS at 21:56

## 2023-06-07 RX ADMIN — SODIUM CHLORIDE 2721 ML: 9 INJECTION, SOLUTION INTRAVENOUS at 18:23

## 2023-06-07 RX ADMIN — IPRATROPIUM BROMIDE AND ALBUTEROL SULFATE 3 ML: .5; 3 SOLUTION RESPIRATORY (INHALATION) at 16:20

## 2023-06-07 RX ADMIN — CEFEPIME 2 G: 2 INJECTION, POWDER, FOR SOLUTION INTRAVENOUS at 16:42

## 2023-06-07 RX ADMIN — ACETAMINOPHEN 650 MG: 650 SUPPOSITORY RECTAL at 22:15

## 2023-06-07 RX ADMIN — VANCOMYCIN HYDROCHLORIDE 1500 MG: 10 INJECTION, POWDER, LYOPHILIZED, FOR SOLUTION INTRAVENOUS at 18:52

## 2023-06-07 RX ADMIN — SODIUM CHLORIDE 100 ML/HR: 9 INJECTION, SOLUTION INTRAVENOUS at 21:55

## 2023-06-07 NOTE — ED NOTES
Pt. Presents with wife for c/o loose stool, weakness, fever, nausea that began approximately 5 days ago. Family reports that he has fallen during this time.

## 2023-06-07 NOTE — CASE MANAGEMENT/SOCIAL WORK
"Discharge Planning Assessment   Du     Patient Name: Mark Marie  MRN: [id number]  Today's Date: 6/7/2023    Admit Date: 6/7/2023    Plan: Home with wife   Discharge Needs Assessment       Row Name 06/07/23 1907       Living Environment    People in Home spouse    Current Living Arrangements home    Potentially Unsafe Housing Conditions none    Provides Primary Care For no one    Able to Return to Prior Arrangements yes       Resource/Environmental Concerns    Resource/Environmental Concerns none    Transportation Concerns none       Food Insecurity    Within the past 12 months, you worried that your food would run out before you got the money to buy more. Never true    Within the past 12 months, the food you bought just didn't last and you didn't have money to get more. Never true       Transition Planning    Patient/Family Anticipates Transition to home with family    Patient/Family Anticipated Services at Transition none    Transportation Anticipated family or friend will provide       Discharge Needs Assessment    Readmission Within the Last 30 Days no previous admission in last 30 days    Equipment Currently Used at Home bp cuff    Concerns to be Addressed denies needs/concerns at this time    Anticipated Changes Related to Illness none    Equipment Needed After Discharge none                   Discharge Plan       Row Name 06/07/23 1907       Plan    Plan Home with wife    Plan Comments Met with Patient at bedside Lives at home with wife. IADL\"s normally drives self but wife can drive home. PCP and Pharmacy verified, able to afford medications. D/C barriers: IV ATB                  Continued Care and Services - Admitted Since 6/7/2023    Coordination has not been started for this encounter.          Demographic Summary       Row Name 06/07/23 1906       General Information    Admission Type observation    Arrived From emergency department    Referral Source admission list    Reason for Consult discharge " planning    Preferred Language English                   Functional Status       Row Name 06/07/23 1906       Functional Status    Usual Activity Tolerance good    Current Activity Tolerance good       Functional Status, IADL    Medications independent    Meal Preparation independent    Housekeeping independent    Laundry independent    Shopping independent       Mental Status    General Appearance WDL WDL       Mental Status Summary    Recent Changes in Mental Status/Cognitive Functioning no changes                      Addis Ennis, RN

## 2023-06-07 NOTE — ED PROVIDER NOTES
Subjective   History of Present Illness  82-year-old male complaining of fever starting on Friday.  The patient reports that he had a little bit of a cough.  He states on the weekend he was nauseated and vomited once on Saturday.  He states that he then felt somewhat better but in the last 24 hours has had the return of fever and shaking chills.  The patient reports that he has had no melena hematemesis hematochezia.  He reports that he has had some frequency of urination and increased feelings of retention.  He reports no meatal discharge or hematuria.  He reports no skin breakdown or sinus complaints  He reports he had some loose stools over the weekend  Review of Systems   Constitutional:  Positive for chills, fatigue and fever.   HENT:  Negative for sore throat.    Eyes:  Negative for discharge.   Respiratory:  Positive for cough, chest tightness and shortness of breath.    Cardiovascular:  Negative for palpitations and leg swelling.   Gastrointestinal:  Positive for diarrhea, nausea and vomiting.   Genitourinary:  Positive for decreased urine volume and frequency.   Hematological:  Does not bruise/bleed easily.     Past Medical History:   Diagnosis Date    Cataract removed 16 years ago    Diabetes mellitus     HL (hearing loss) have worn hearing aids for 16 years    Hyperlipidemia     Hypertension     Hypothyroidism     Joint pain     Low back pain     Prostate cancer 2011    Squamous cell carcinoma of skin     right temple       Allergies   Allergen Reactions    Ramipril Swelling     Leg swelling       Past Surgical History:   Procedure Laterality Date    HEMORROIDECTOMY      HERNIA REPAIR      inguinal and umbilical    KNEE SURGERY Bilateral     trimmed cartilage    VASECTOMY  50 years ago       Family History   Problem Relation Age of Onset    Heart disease Father     Hypertension Father     Hypertension Mother     Other Mother         Alzheimers    Cancer Brother         prostate    Cancer Brother          prostate    Hypertension Brother     Cancer Daughter        Social History     Socioeconomic History    Marital status:    Tobacco Use    Smoking status: Never    Smokeless tobacco: Never   Substance and Sexual Activity    Alcohol use: Not Currently     Comment: rarely;  caffeine 1c qd    Drug use: No       Reports no known tick exposure no recent unusual food water travel or activity    Objective   Physical Exam  Alert [place] Coma Scale 15   HEENT: Pupils equal and reactive to light. Conjunctivae are not injected. Normal tympanic membranes. Oropharynx and nares are normal.   Neck: Supple. Midline trachea. No JVD. No goiter.   Chest: Scattered rhonchi bilaterally and equal breath sounds bilaterally, regular rate and rhythm without murmur or rub.   Abdomen: Positive bowel sounds, suprapubic discomfort is noted, nondistended. No rebound or peritoneal signs. No CVA tenderness.   Extremities no clubbing. cyanosis or edema. Motor sensory exam is normal. The full range of motion is intact   Skin: Warm and dry, no rashes or petechia.  No definite cellulitis  Lymphatic: No regional lymphadenopathy. No calf pain, swelling or Homans sign    Procedures           ED Course      Labs Reviewed   COMPREHENSIVE METABOLIC PANEL - Abnormal; Notable for the following components:       Result Value    Glucose 124 (*)     BUN 27 (*)     Creatinine 1.29 (*)     Sodium 135 (*)     Chloride 97 (*)     ALT (SGPT) 53 (*)     AST (SGOT) 222 (*)     eGFR 56.1 (*)     All other components within normal limits    Narrative:     GFR Normal >60  Chronic Kidney Disease <60  Kidney Failure <15    The GFR formula is only valid for adults with stable renal function between ages 18 and 70.   CBC WITH AUTO DIFFERENTIAL - Abnormal; Notable for the following components:    Platelets 43 (*)     All other components within normal limits   URINALYSIS W/ CULTURE IF INDICATED - Abnormal; Notable for the following components:    Color, UA Dark Yellow  (*)     Ketones, UA 15 mg/dL (1+) (*)     Bilirubin, UA Small (1+) (*)     Blood, UA Large (3+) (*)     Protein, UA >=300 mg/dL (3+) (*)     Leuk Esterase, UA Trace (*)     All other components within normal limits    Narrative:     In absence of clinical symptoms, the presence of pyuria, bacteria, and/or nitrites on the urinalysis result does not correlate with infection.   MANUAL DIFFERENTIAL - Abnormal; Notable for the following components:    Lymphocyte % 5.0 (*)     Bands %  22.0 (*)     Metamyelocyte % 1.0 (*)     Plasma Cells % 1.0 (*)     Lymphocytes Absolute 0.23 (*)     All other components within normal limits   URINALYSIS, MICROSCOPIC ONLY - Abnormal; Notable for the following components:    RBC, UA 0-2 (*)     WBC, UA 0-2 (*)     Squamous Epithelial Cells, UA 3-6 (*)     All other components within normal limits   RESPIRATORY PANEL PCR W/ COVID-19 (SARS-COV-2) [facility]UPINDER/NADEEM/CAMPOS/PAD/COR/MAD/ROBERTO IN-HOUSE, NP SWAB IN [facility], 3-4 HR TAT - Normal    Narrative:     In the setting of a positive respiratory panel with a viral infection PLUS a negative procalcitonin without other underlying concern for bacterial infection, consider observing off antibiotics or discontinuation of antibiotics and continue supportive care. If the respiratory panel is positive for atypical bacterial infection (Bordetella pertussis, Chlamydophila pneumoniae, or Mycoplasma pneumoniae), consider antibiotic de-escalation to target atypical bacterial infection.   POC LACTATE - Normal   BLOOD CULTURE   BLOOD CULTURE   RAINBOW DRAW    Narrative:     The following orders were created for panel order [place] Draw.  Procedure                               Abnormality         Status                     ---------                               -----------         ------                     Green Top (Gel)[[id number]]                                                             Lavender Top[[id number]]                                     Final result                Gold Top - SST[[id number]]                                   Final result               Light Blue Top[[id number]]                                   Final result                 Please view results for these tests on the individual orders.   SCAN SLIDE   POC LACTATE   POC LACTATE   CBC AND DIFFERENTIAL    Narrative:     The following orders were created for panel order CBC & Differential.  Procedure                               Abnormality         Status                     ---------                               -----------         ------                     CBC Auto Differential[[id number]]        Abnormal            Final result               Scan Slide[[id number]]                                       Final result                 Please view results for these tests on the individual orders.   LAVENDER TOP   GOLD TOP - SST   LIGHT BLUE TOP     Medications   sodium chloride 0.9 % flush 10 mL (has no administration in time range)   sodium chloride 0.9 % bolus 2,721 mL (2,721 mL Intravenous New Bag 6/7/23 1823)   vancomycin 1500 mg/500 mL 0.9% NS IVPB ([facility]) (has no administration in time range)   ipratropium-albuterol (DUO-NEB) nebulizer solution 3 mL (3 mL Nebulization Given 6/7/23 1620)   cefepime 2 gm IVPB in 100 ml NS (MBP) (0 g Intravenous Stopped 6/7/23 1724)     CT Head Without Contrast    Result Date: 6/7/2023  Impression: 1.No acute intracranial abnormality on head CT. Age-appropriate volume loss. Mild to moderate amount of low-density periventricular and subcortical white matter is most commonly seen with chronic small vessel ischemic change. 2.MRI is more sensitive to evaluate for acute or subacute infarct. Electronically Signed: Norma Hathaway  6/7/2023 5:19 PM EDT  Workstation ID: [id number]    XR Chest 1 View    Result Date: 6/7/2023  Impression: 1. No acute cardiopulmonary disease Electronically Signed: Joey Mckenzie  6/7/2023 4:22 PM EDT  Workstation ID: [id number]                                         Medical Decision Making  The patient mental status normalized after defervesced since.  [place] Coma Scale was 15 NIH was 0 the patient was given a fluid bolus and started on cefepime and also vancomycin.  The patient will be admitted and the case discussed the hospitalist service.  He and his family were agreeable to this plan of treatment CT abdomen pelvis results are pending at time of dictation    Problems Addressed:  Acute kidney injury: complicated acute illness or injury  Bandemia: complicated acute illness or injury  Dehydration: complicated acute illness or injury  Sepsis, due to unspecified organism, unspecified whether acute organ dysfunction present: complicated acute illness or injury  Thrombocytopenia: complicated acute illness or injury    Amount and/or Complexity of Data Reviewed  Independent Historian: spouse     Details: Family  External Data Reviewed: notes.  Labs: ordered. Decision-making details documented in ED Course.  Radiology: ordered and independent interpretation performed.  ECG/medicine tests: ordered and independent interpretation performed.     Details: Tracing done for altered mental status showed no ST segment elevation or depression  Discussion of management or test interpretation with external provider(s): Case discussed with hospitalist service    Risk  OTC drugs.  Prescription drug management.  Decision regarding hospitalization.  Risk Details: Risk of progression of sepsis.  Risk of progression of renal failure and renal injury    The patient is apparently a blood donor and has had no problems with platelet counts in the past.  No tick exposure was noted by the patient    Final diagnoses:   Sepsis, due to unspecified organism, unspecified whether acute organ dysfunction present   Bandemia   Dehydration   Acute kidney injury   Thrombocytopenia       ED Disposition  ED Disposition       ED Disposition   Decision to Admit    Condition   --    Comment   --               No  follow-up provider specified.       Medication List      No changes were made to your prescriptions during this visit.            Yo Glynn MD  06/07/23 1827       oY Glynn MD  06/07/23 6192

## 2023-06-07 NOTE — ASSESSMENT & PLAN NOTE
Negative Flu and COVID tests here.  Patient advised to go to the ER now.  [facility] ER notified.

## 2023-06-07 NOTE — PROGRESS NOTES
"Chief Complaint  Balance Issues, Fatigue (Weakness  x 1 week ), Spasms (Abdominal area- causes SOA ), and Vomiting (At times- hardly eating )    Subjective        Mark Marie presents to [facility]  History of Present Illness  Fever, diarrhea, emesis, nausea, hiccups, shortness of breath.  Tolerating a little jello and oatmeal. Promethazine helped a little. Feels weak.  He fell yesterday in the yard. He fell in to his pond about 3 weeks ago.   Fatigue  This is a new problem. The current episode started in the past 7 days. The problem occurs constantly. The problem has been rapidly worsening. Associated symptoms include chills, congestion, coughing, fatigue and vomiting. Pertinent negatives include no chest pain. The symptoms are aggravated by exertion.   Vomiting   Associated symptoms include chills and coughing. Pertinent negatives include no chest pain.   Objective   Vital Signs:  /72 (BP Location: Left arm, Patient Position: Sitting, Cuff Size: Large Adult)   Pulse 108   Temp (!) 103.1 °F (39.5 °C) (Infrared)   SpO2 93%   Estimated body mass index is 34.35 kg/m² as calculated from the following:    Height as of 2/14/23: 162.6 cm (64\").    Weight as of 2/14/23: 90.8 kg (200 lb 1.6 oz).             Physical Exam  Vitals and nursing note reviewed.   Constitutional:       General: He is not in acute distress.     Appearance: He is well-developed. He is ill-appearing.      Comments: In wheelchair   HENT:      Head: Normocephalic.   Eyes:      General: Lids are normal.   Neck:      Thyroid: No thyroid mass or thyromegaly.      Trachea: Trachea normal.   Cardiovascular:      Rate and Rhythm: Normal rate and regular rhythm.      Heart sounds: Normal heart sounds.   Pulmonary:      Effort: Pulmonary effort is normal.      Breath sounds: Rhonchi present.   Abdominal:      Palpations: Abdomen is soft.   Musculoskeletal:      Cervical back: Normal range of motion.      Right lower " leg: No edema.      Left lower leg: No edema.   Lymphadenopathy:      Cervical: No cervical adenopathy.   Skin:     General: Skin is warm and dry.   Neurological:      Mental Status: He is alert and oriented to person, place, and time.   Psychiatric:         Attention and Perception: He is attentive.         Mood and Affect: Mood normal.         Speech: Speech normal.         Behavior: Behavior normal.      Result Review :  The following data was reviewed by: Diana Linares MD on 06/07/2023:  Common labs          7/13/2022    10:42 2/8/2023    11:11   Common Labs   Glucose 113  102    BUN 16  13    Creatinine 0.86  0.85    Sodium 141  140    Potassium 4.5  4.6    Chloride 105  102    Calcium 9.0  9.8    Albumin 3.90  4.1    Total Bilirubin 0.3  0.4    Alkaline Phosphatase 54  62    AST (SGOT) 16  15    ALT (SGPT) 12  13    WBC 4.14  5.01    Hemoglobin 14.9  15.2    Hematocrit 45.2  44.1    Platelets 143  264    Total Cholesterol 159  195    Triglycerides 148  151    HDL Cholesterol 54  63    LDL Cholesterol  79  106    Hemoglobin A1C 5.8  5.6                   Assessment and Plan   Diagnoses and all orders for this visit:    1. Fever in other diseases (Primary)  Assessment & Plan:  Negative Flu and COVID tests here.  Patient advised to go to the ER now.  [facility] ER notified.        2. Confusion    3. Dyspnea, unspecified type    4. Pneumonia due to infectious organism, unspecified laterality, unspecified part of lung             Follow Up   No follow-ups on file.  Patient was given instructions and counseling regarding his condition or for health maintenance advice. Please see specific information pulled into the AVS if appropriate.

## 2023-06-07 NOTE — H&P
[facility]   HISTORY AND PHYSICAL    Patient Name: Mark Marie  : 1940  MRN: [id number]  Primary Care Physician:  Diana Linares MD  Date of admission: 2023    Subjective   Subjective     Chief Complaint: Fever, chills, weakness, falls, nausea, vomiting, diarrhea difficulty swallowing    History of Present Illness    This is a 82-year-old male who presents to the hospital on 2023 with complaints of worsening fever, chills, weakness, falls.  Patient says he has been feeling poorly for over a week but really started feeling bad on Friday.  Complains of an episode of nausea and diarrhea on Saturday.  Now having periodic episodes of difficulty breathing with the inability to swallow, hiccups and some congestion.  Has general abdominal discomfort but no overt abdominal pain.  Denies urinary symptoms.  [place] not aware of any recent tick bites but did recently take a tick off of his wife.  Denies any recent travel or sick contacts.  Reported confusion at home and when first admitted.   Does have a history of early dementia    Review of Systems   Constitutional:  Positive for chills, fatigue and fever.   HENT:  Positive for congestion and trouble swallowing.    Eyes: Negative.    Respiratory:  Positive for choking and shortness of breath.    Cardiovascular: Negative.    Gastrointestinal:  Positive for diarrhea, nausea and vomiting.   Endocrine: Negative.    Genitourinary: Negative.    Musculoskeletal:  Positive for arthralgias.   Skin: Negative.    Neurological:  Positive for weakness.        Fall at home   Psychiatric/Behavioral:  Positive for confusion.    All other systems reviewed and are negative.     Personal History     Past Medical History:   Diagnosis Date    Cataract removed 16 years ago    Diabetes mellitus     HL (hearing loss) have worn hearing aids for 16 years    Hyperlipidemia     Hypertension     Hypothyroidism     Joint pain     Low back pain      Prostate cancer 2011    Squamous cell carcinoma of skin     right temple       Past Surgical History:   Procedure Laterality Date    HEMORROIDECTOMY      HERNIA REPAIR      inguinal and umbilical    KNEE SURGERY Bilateral     trimmed cartilage    VASECTOMY  50 years ago       Family History: family history includes Cancer in his brother, brother, and daughter; Heart disease in his father; Hypertension in his brother, father, and mother; Other in his mother. Otherwise pertinent FHx was reviewed and not pertinent to current issue.    Social History:  reports that he has never smoked. He has never used smokeless tobacco. He reports that he does not currently use alcohol. He reports that he does not use drugs.    Home Medications:  donepezil, levothyroxine, losartan, metFORMIN, promethazine, simvastatin, tamsulosin, and traMADol    Allergies:  Allergies   Allergen Reactions    Ramipril Swelling     Leg swelling       Objective    Objective     Vitals:   Temp:  [100.3 °F (37.9 °C)-103.2 °F (39.6 °C)] 100.3 °F (37.9 °C)  Heart Rate:  [102-109] 103  Resp:  [18-26] 18  BP: (134-148)/(59-72) 142/70    Physical Exam  Vitals and nursing note reviewed.   Constitutional:       Appearance: He is normal weight. He is ill-appearing.   HENT:      Head: Normocephalic and atraumatic.      Mouth/Throat:      Mouth: Mucous membranes are dry.      Comments: White coating on tongue  Eyes:      Extraocular Movements: Extraocular movements intact.      Conjunctiva/sclera: Conjunctivae normal.      Pupils: Pupils are equal, round, and reactive to light.   Cardiovascular:      Rate and Rhythm: Tachycardia present.   Pulmonary:      Effort: Pulmonary effort is normal.      Breath sounds: Rhonchi present.      Comments: Patient had an episode of tachypnea and difficulty swallowing during my assessment which lasted for approximately 10 seconds    Abdominal:      General: Bowel sounds are normal. There is no distension.      Palpations: Abdomen  is soft.      Tenderness: There is abdominal tenderness.   Musculoskeletal:         General: Normal range of motion.      Cervical back: Neck supple.      Right lower leg: No edema.      Left lower leg: No edema.   Skin:     Coloration: Skin is pale.   Neurological:      Mental Status: He is alert and oriented to person, place, and time.       Result Review    Result Review:  I have personally reviewed the results from the time of this admission to 6/7/2023 19:42 EDT and agree with these findings:  [x]  Laboratory list / accordion  [x]  Microbiology  [x]  Radiology  []  EKG/Telemetry   []  Cardiology/Vascular   []  Pathology  []  Old records  []  Other:  Most notable findings include: Fever as high as 103.2 degrees.  Creatinine of 1.27, BUN 27, AST of 222 and ALT of 53, left skeletal count of 3.9 and platelets 43.  Bands of 22.  Urinalysis was unremarkable.  COVID-19 screen respiratory viral DNA panel are negative      Assessment & Plan   Assessment / Plan     Brief Patient Summary:  Mark Marie is a 82 y.o. male who presents to the hospital on 6/7/2023 with complaints of fever, chills, weakness, falls at home nausea vomiting diarrhea with episodes of shortness of breath and choking.  Is not aware of a tick bite [place] has had family members with tick bites.    Active Hospital Problems:  Active Hospital Problems    Diagnosis     **Sepsis, due to unspecified organism, unspecified whether acute organ dysfunction present     Febrile illness     Thrombocytopenia     Elevated liver transaminase level     Dysphagia     Acute kidney injury     Confusion     History of prostate cancer     Memory change     Hyperlipidemia     Hypertension     Hypothyroidism      Plan:     Febrile illness with complications of elevated liver transaminase and thrombocytopenia.  -Differentials include tickborne illness versus viral illness such as cytomegalovirus or Shobha-Barr virus  -Patient is unaware of recurrent  tick bite but has had tick bites in the past [place] and has family members with recent tick bites  -COVID 19 screen and respiratory viral DNA panel are negative  -Chest x-ray was unremarkable  -Blood cultures are pending  -ED has ordered a CT of the abdomen pelvis which is pending since patient did have significant bandemia  -Start IV Zosyn waiting on CT and work-up  -IV doxycycline to cover for tickborne illness  -Ehrlichia PCR and antibody panel  -Eloy mounted spotted fever antibody panel  -Lyme antibody panel  -CMV PCR and IgM  -Shobha-Barr virus PCR and antibody panel    Episodic periods of dysphagia with shortness of breath that last several seconds to several minutes  -1 episode witnessed during my assessment lasted approximately 10 seconds  -Keep patient n.p.o. until speech therapy can see the patient    Acute kidney injury  -Possible dehydration due to decreased oral intake  -Gentle hydration    Confusion at at home and on admission, along with weakness and falls  -Resolved  -The head CT did not show any acute findings    History of early dementia  -Continue donepezil when patient has been cleared by speech therapy    Type 2 diabetes  -Continue metformin when patient is cleared by speech therapy  -Sliding scale insulin    Hyperlipidemia   -Continue simvastatin when patient is cleared by speech therapy    Hypertension  -Continue losartan when patient is cleared by speech therapy    Hypothyroidism  -Continue levothyroxine when patient is cleared by speech therapy    History of prostate cancer  -Continue tamsulosin when patient is cleared by speech therapy    DVT prophylaxis:  Mechanical DVT prophylaxis orders are present.    CODE STATUS:       Admission Status:  I believe this patient meets observation status.    Radha Hutson, APRN

## 2023-06-08 LAB
ALBUMIN SERPL-MCNC: 2.9 G/DL (ref 3.5–5.2)
ALBUMIN/GLOB SERPL: 1.2 G/DL
ALP SERPL-CCNC: 43 U/L (ref 39–117)
ALT SERPL W P-5'-P-CCNC: 49 U/L (ref 1–41)
ANION GAP SERPL CALCULATED.3IONS-SCNC: 10 MMOL/L (ref 5–15)
AST SERPL-CCNC: 220 U/L (ref 1–40)
BILIRUB SERPL-MCNC: 0.4 MG/DL (ref 0–1.2)
BUN SERPL-MCNC: 21 MG/DL (ref 8–23)
BUN/CREAT SERPL: 20.8 (ref 7–25)
C3 FRG RBC-MCNC: ABNORMAL
CALCIUM SPEC-SCNC: 7.2 MG/DL (ref 8.6–10.5)
CHLORIDE SERPL-SCNC: 106 MMOL/L (ref 98–107)
CK SERPL-CCNC: 6193 U/L (ref 20–200)
CO2 SERPL-SCNC: 22 MMOL/L (ref 22–29)
CREAT SERPL-MCNC: 1.01 MG/DL (ref 0.76–1.27)
DACRYOCYTES BLD QL SMEAR: ABNORMAL
DACRYOCYTES BLD QL SMEAR: ABNORMAL
DEPRECATED RDW RBC AUTO: 42.4 FL (ref 37–54)
DEPRECATED RDW RBC AUTO: 43.3 FL (ref 37–54)
EGFRCR SERPLBLD CKD-EPI 2021: 75.2 ML/MIN/1.73
ERYTHROCYTE [DISTWIDTH] IN BLOOD BY AUTOMATED COUNT: 13.1 % (ref 12.3–15.4)
ERYTHROCYTE [DISTWIDTH] IN BLOOD BY AUTOMATED COUNT: 13.4 % (ref 12.3–15.4)
ETHANOL UR QL: <0.01 %
FIBRINOGEN PPP-MCNC: 253 MG/DL (ref 210–450)
FOLATE SERPL-MCNC: >20 NG/ML (ref 4.78–24.2)
GLOBULIN UR ELPH-MCNC: 2.4 GM/DL
GLUCOSE BLDC GLUCOMTR-MCNC: 148 MG/DL (ref 70–105)
GLUCOSE BLDC GLUCOMTR-MCNC: 160 MG/DL (ref 70–105)
GLUCOSE BLDC GLUCOMTR-MCNC: 169 MG/DL (ref 70–105)
GLUCOSE BLDC GLUCOMTR-MCNC: 90 MG/DL (ref 70–105)
GLUCOSE BLDC GLUCOMTR-MCNC: 92 MG/DL (ref 70–105)
GLUCOSE SERPL-MCNC: 108 MG/DL (ref 65–99)
HAPTOGLOB SERPL-MCNC: 267 MG/DL (ref 30–200)
HCT VFR BLD AUTO: 37.9 % (ref 37.5–51)
HCT VFR BLD AUTO: 38.3 % (ref 37.5–51)
HGB BLD-MCNC: 12.7 G/DL (ref 13–17.7)
HGB BLD-MCNC: 12.8 G/DL (ref 13–17.7)
LDH SERPL-CCNC: 904 U/L (ref 135–225)
LYMPHOCYTES # BLD MANUAL: 0.11 10*3/MM3 (ref 0.7–3.1)
LYMPHOCYTES # BLD MANUAL: 0.17 10*3/MM3 (ref 0.7–3.1)
LYMPHOCYTES NFR BLD MANUAL: 1 % (ref 5–12)
LYMPHOCYTES NFR BLD MANUAL: 2 % (ref 5–12)
MCH RBC QN AUTO: 30.9 PG (ref 26.6–33)
MCH RBC QN AUTO: 31.1 PG (ref 26.6–33)
MCHC RBC AUTO-ENTMCNC: 33.6 G/DL (ref 31.5–35.7)
MCHC RBC AUTO-ENTMCNC: 33.6 G/DL (ref 31.5–35.7)
MCV RBC AUTO: 92 FL (ref 79–97)
MCV RBC AUTO: 92.6 FL (ref 79–97)
METAMYELOCYTES NFR BLD MANUAL: 2 % (ref 0–0)
METAMYELOCYTES NFR BLD MANUAL: 8 % (ref 0–0)
MONOCYTES # BLD: 0.03 10*3/MM3 (ref 0.1–0.9)
MONOCYTES # BLD: 0.06 10*3/MM3 (ref 0.1–0.9)
NEUTROPHILS # BLD AUTO: 2.35 10*3/MM3 (ref 1.7–7)
NEUTROPHILS # BLD AUTO: 2.61 10*3/MM3 (ref 1.7–7)
NEUTROPHILS NFR BLD MANUAL: 66 % (ref 42.7–76)
NEUTROPHILS NFR BLD MANUAL: 67 % (ref 42.7–76)
NEUTS BAND NFR BLD MANUAL: 18 % (ref 0–5)
NEUTS BAND NFR BLD MANUAL: 23 % (ref 0–5)
NRBC SPEC MANUAL: 1 /100 WBC (ref 0–0.2)
PATHOLOGY REVIEW: YES
PLASMA CELL PREC NFR BLD MANUAL: 3 % (ref 0–0)
PLATELET # BLD AUTO: 36 10*3/MM3 (ref 140–450)
PLATELET # BLD AUTO: 38 10*3/MM3 (ref 140–450)
PMV BLD AUTO: 8.8 FL (ref 6–12)
PMV BLD AUTO: 9.3 FL (ref 6–12)
POIKILOCYTOSIS BLD QL SMEAR: ABNORMAL
POIKILOCYTOSIS BLD QL SMEAR: ABNORMAL
POTASSIUM SERPL-SCNC: 3.6 MMOL/L (ref 3.5–5.2)
PROT SERPL-MCNC: 5.3 G/DL (ref 6–8.5)
RBC # BLD AUTO: 4.12 10*6/MM3 (ref 4.14–5.8)
RBC # BLD AUTO: 4.13 10*6/MM3 (ref 4.14–5.8)
RETICS # AUTO: 0.02 10*6/MM3 (ref 0.02–0.13)
RETICS/RBC NFR AUTO: 0.44 % (ref 0.7–1.9)
SCAN SLIDE: NORMAL
SCAN SLIDE: NORMAL
SMALL PLATELETS BLD QL SMEAR: ABNORMAL
SMALL PLATELETS BLD QL SMEAR: ABNORMAL
SODIUM SERPL-SCNC: 138 MMOL/L (ref 136–145)
VARIANT LYMPHS NFR BLD MANUAL: 4 % (ref 19.6–45.3)
VARIANT LYMPHS NFR BLD MANUAL: 6 % (ref 19.6–45.3)
VIT B12 BLD-MCNC: >2000 PG/ML (ref 211–946)
WBC MORPH BLD: NORMAL
WBC MORPH BLD: NORMAL
WBC NRBC COR # BLD: 2.8 10*3/MM3 (ref 3.4–10.8)
WBC NRBC COR # BLD: 2.9 10*3/MM3 (ref 3.4–10.8)

## 2023-06-08 PROCEDURE — 63710000001 INSULIN LISPRO (HUMAN) PER 5 UNITS: Performed by: NURSE PRACTITIONER

## 2023-06-08 PROCEDURE — 82784 ASSAY IGA/IGD/IGG/IGM EACH: CPT | Performed by: NURSE PRACTITIONER

## 2023-06-08 PROCEDURE — 85025 COMPLETE CBC W/AUTO DIFF WBC: CPT | Performed by: NURSE PRACTITIONER

## 2023-06-08 PROCEDURE — 82607 VITAMIN B-12: CPT | Performed by: NURSE PRACTITIONER

## 2023-06-08 PROCEDURE — 82948 REAGENT STRIP/BLOOD GLUCOSE: CPT

## 2023-06-08 PROCEDURE — 99222 1ST HOSP IP/OBS MODERATE 55: CPT | Performed by: NURSE PRACTITIONER

## 2023-06-08 PROCEDURE — 83921 ORGANIC ACID SINGLE QUANT: CPT | Performed by: NURSE PRACTITIONER

## 2023-06-08 PROCEDURE — 86334 IMMUNOFIX E-PHORESIS SERUM: CPT | Performed by: NURSE PRACTITIONER

## 2023-06-08 PROCEDURE — 85384 FIBRINOGEN ACTIVITY: CPT | Performed by: NURSE PRACTITIONER

## 2023-06-08 PROCEDURE — 85007 BL SMEAR W/DIFF WBC COUNT: CPT | Performed by: NURSE PRACTITIONER

## 2023-06-08 PROCEDURE — 97162 PT EVAL MOD COMPLEX 30 MIN: CPT

## 2023-06-08 PROCEDURE — 25010000002 PIPERACILLIN SOD-TAZOBACTAM PER 1 G: Performed by: NURSE PRACTITIONER

## 2023-06-08 PROCEDURE — 84155 ASSAY OF PROTEIN SERUM: CPT | Performed by: NURSE PRACTITIONER

## 2023-06-08 PROCEDURE — 83010 ASSAY OF HAPTOGLOBIN QUANT: CPT | Performed by: NURSE PRACTITIONER

## 2023-06-08 PROCEDURE — 84165 PROTEIN E-PHORESIS SERUM: CPT | Performed by: NURSE PRACTITIONER

## 2023-06-08 PROCEDURE — 82746 ASSAY OF FOLIC ACID SERUM: CPT | Performed by: NURSE PRACTITIONER

## 2023-06-08 PROCEDURE — 82550 ASSAY OF CK (CPK): CPT | Performed by: FAMILY MEDICINE

## 2023-06-08 PROCEDURE — 92610 EVALUATE SWALLOWING FUNCTION: CPT

## 2023-06-08 RX ORDER — ACETAMINOPHEN 650 MG/1
650 SUPPOSITORY RECTAL ONCE
Status: COMPLETED | OUTPATIENT
Start: 2023-06-08 | End: 2023-06-08

## 2023-06-08 RX ORDER — ACETAMINOPHEN 325 MG/1
650 TABLET ORAL EVERY 6 HOURS PRN
Status: DISCONTINUED | OUTPATIENT
Start: 2023-06-08 | End: 2023-06-12 | Stop reason: HOSPADM

## 2023-06-08 RX ADMIN — Medication 10 ML: at 08:05

## 2023-06-08 RX ADMIN — SODIUM CHLORIDE 100 ML/HR: 9 INJECTION, SOLUTION INTRAVENOUS at 22:30

## 2023-06-08 RX ADMIN — SODIUM CHLORIDE 100 ML/HR: 9 INJECTION, SOLUTION INTRAVENOUS at 13:40

## 2023-06-08 RX ADMIN — PIPERACILLIN SODIUM AND TAZOBACTAM SODIUM 3.38 G: 3; .375 INJECTION, POWDER, LYOPHILIZED, FOR SOLUTION INTRAVENOUS at 06:06

## 2023-06-08 RX ADMIN — ACETAMINOPHEN 650 MG: 650 SUPPOSITORY RECTAL at 11:37

## 2023-06-08 RX ADMIN — DOXYCYCLINE 100 MG: 100 INJECTION, POWDER, LYOPHILIZED, FOR SOLUTION INTRAVENOUS at 08:05

## 2023-06-08 RX ADMIN — PIPERACILLIN SODIUM AND TAZOBACTAM SODIUM 3.38 G: 3; .375 INJECTION, POWDER, LYOPHILIZED, FOR SOLUTION INTRAVENOUS at 14:19

## 2023-06-08 RX ADMIN — PIPERACILLIN SODIUM AND TAZOBACTAM SODIUM 3.38 G: 3; .375 INJECTION, POWDER, LYOPHILIZED, FOR SOLUTION INTRAVENOUS at 22:00

## 2023-06-08 RX ADMIN — INSULIN LISPRO 2 UNITS: 100 INJECTION, SOLUTION INTRAVENOUS; SUBCUTANEOUS at 17:55

## 2023-06-08 RX ADMIN — DOXYCYCLINE 100 MG: 100 INJECTION, POWDER, LYOPHILIZED, FOR SOLUTION INTRAVENOUS at 21:00

## 2023-06-08 NOTE — THERAPY EVALUATION
Patient Name: Mark Marie  : 1940    MRN: [id number]                              Today's Date: 2023       Admit Date: 2023    Visit Dx:     ICD-10-CM ICD-9-CM   1. Sepsis, due to unspecified organism, unspecified whether acute organ dysfunction present  A41.9 038.9     995.91   2. Bandemia  D72.825 288.66   3. Dehydration  E86.0 276.51   4. Acute kidney injury  N17.9 584.9   5. Thrombocytopenia  D69.6 287.5     Patient Active Problem List   Diagnosis    Right calf pain    Disorder associated with type 2 diabetes mellitus    Hyperlipidemia    Hypertension    Hypothyroidism    Wellness examination    Chronic bilateral low back pain without sciatica    Memory change    Medicare annual wellness visit, subsequent    Clinical diagnosis of COVID-19    Lumbar radiculopathy    Basal cell carcinoma, face    Crushing injury of right middle finger    Fatigue    Need for hepatitis C screening test    Pneumonia due to infectious organism    Dyspnea    Confusion    Sepsis, due to unspecified organism, unspecified whether acute organ dysfunction present    Febrile illness    Thrombocytopenia    Elevated liver transaminase level    Dysphagia    Acute kidney injury    History of prostate cancer     Past Medical History:   Diagnosis Date    Cataract removed 16 years ago    Diabetes mellitus     HL (hearing loss) have worn hearing aids for 16 years    Hyperlipidemia     Hypertension     Hypothyroidism     Joint pain     Low back pain     Prostate cancer 2011    Squamous cell carcinoma of skin     right temple     Past Surgical History:   Procedure Laterality Date    HEMORROIDECTOMY      HERNIA REPAIR      inguinal and umbilical    KNEE SURGERY Bilateral     trimmed cartilage    VASECTOMY  50 years ago      General Information       Row Name 23 1419          Physical Therapy Time and Intention    Document Type evaluation  -BR     Mode of Treatment physical therapy  -BR       Row Name 23 1419           General Information    Patient Profile Reviewed yes  -BR     Prior Level of Function independent:;community mobility;driving;all household mobility  -BR     Existing Precautions/Restrictions fall;swallow precautions  -BR       Row Name 06/08/23 1419          Living Environment    People in Home spouse  Pt's spouse has sternal precautions from recent CABG and thus is unable to physically assist pt.  -BR       Row Name 06/08/23 1419          Home Main Entrance    Number of Stairs, Main Entrance one  -BR       Row Name 06/08/23 1419          Stairs Within Home, Primary    Number of Stairs, Within Home, Primary none  -BR       Row Name 06/08/23 1419          Cognition    Orientation Status (Cognition) oriented x 4  -BR       Row Name 06/08/23 1419          Safety Issues, Functional Mobility    Impairments Affecting Function (Mobility) balance;endurance/activity tolerance;strength;range of motion (ROM);postural/trunk control;pain;shortness of breath  -BR               User Key  (r) = Recorded By, (t) = Taken By, (c) = Cosigned By      Initials Name Provider Type    BR Evelyn Mancilla, PT Physical Therapist                   Mobility       Row Name 06/08/23 1426          Bed Mobility    Bed Mobility supine-sit-supine  -BR     Supine-Sit-Supine [place] (Bed Mobility) contact guard  -BR     Assistive Device (Bed Mobility) head of bed elevated;bed rails  -BR       Row Name 06/08/23 1426          Sit-Stand Transfer    Sit-Stand [place] (Transfers) minimum assist (75% patient effort)  -BR     Assistive Device (Sit-Stand Transfers) walker, front-wheeled  -BR     Comment, (Sit-Stand Transfer) Pt required more than one attempt to achieve standing.  -BR       Row Name 06/08/23 1426          Gait/Stairs (Locomotion)    [place] Level (Gait) minimum assist (75% patient effort);1 person assist  -BR     Assistive Device (Gait) walker, front-wheeled  -BR     Distance in Feet (Gait) 6 feet of lateral sidestepping from  middle of bed to head of bed with pt feeling as ik knees were about to buckle.  -BR     Deviations/Abnormal Patterns (Gait) other (see comments);dariel decreased  decreased foot clearance BLE  -BR     Bilateral Gait Deviations heel strike decreased  -BR               User Key  (r) = Recorded By, (t) = Taken By, (c) = Cosigned By      Initials Name Provider Type    BR Evelyn Mancilla PT Physical Therapist                   Obj/Interventions       Row Name 06/08/23 1430          Range of Motion Comprehensive    General Range of Motion bilateral lower extremity ROM WFL  -BR       Row Name 06/08/23 1430          Strength Comprehensive (MMT)    Comment, General Manual Muscle Testing (MMT) Assessment bilateral hips 3-/5, quads 3-/5 BLE, ankle PF/DF 3/5 BLE  -BR       Row Name 06/08/23 1430          Balance    Balance Assessment sitting static balance;sitting dynamic balance;standing static balance;standing dynamic balance;sit to stand dynamic balance  -BR     Static Sitting Balance standby assist  -BR     Dynamic Sitting Balance minimal assist  -BR     Position, Sitting Balance unsupported;sitting edge of bed  -BR     Sit to Stand Dynamic Balance minimal assist  -BR     Static Standing Balance minimal assist  -BR     Dynamic Standing Balance moderate assist  -BR     Position/Device Used, Standing Balance walker, rolling  -BR       Row Name 06/08/23 1430          Sensory Assessment (Somatosensory)    Sensory Assessment (Somatosensory) LE sensation intact  -BR               User Key  (r) = Recorded By, (t) = Taken By, (c) = Cosigned By      Initials Name Provider Type    BR Evelyn Mancilla PT Physical Therapist                   Goals/Plan       Row Name 06/08/23 1443          Bed Mobility Goal 1 (PT)    Activity/Assistive Device (Bed Mobility Goal 1, PT) bed mobility activities, all  -BR     [place] Level/Cues Needed (Bed Mobility Goal 1, PT) modified independence  -BR     Time Frame (Bed Mobility Goal 1, PT)  long term goal (LTG);2 weeks  -BR       Row Name 06/08/23 1443          Transfer Goal 1 (PT)    Activity/Assistive Device (Transfer Goal 1, PT) transfers, all  -BR     [place] Level/Cues Needed (Transfer Goal 1, PT) contact guard required  -BR     Time Frame (Transfer Goal 1, PT) long term goal (LTG);2 weeks  -BR       Row Name 06/08/23 1443          Gait Training Goal 1 (PT)    Activity/Assistive Device (Gait Training Goal 1, PT) gait (walking locomotion);assistive device use  -BR     [place] Level (Gait Training Goal 1, PT) contact guard required  -BR     Distance (Gait Training Goal 1, PT) 50  -BR     Time Frame (Gait Training Goal 1, PT) long term goal (LTG);2 weeks  -BR       Row Name 06/08/23 1443          Stairs Goal 1 (PT)    Activity/Assistive Device (Stairs Goal 1, PT) stairs, all skills  -BR     [place] Level/Cues Needed (Stairs Goal 1, PT) contact guard required  -BR     Number of Stairs (Stairs Goal 1, PT) 1  -BR     Time Frame (Stairs Goal 1, PT) long term goal (LTG);2 weeks  -BR       Row Name 06/08/23 1443          Therapy Assessment/Plan (PT)    Planned Therapy Interventions (PT) balance training;bed mobility training;gait training;home exercise program;patient/family education;neuromuscular re-education;transfer training;stair training;strengthening;stretching;ROM (range of motion)  -BR               User Key  (r) = Recorded By, (t) = Taken By, (c) = Cosigned By      Initials Name Provider Type    BR Evelyn Mancilla, PT Physical Therapist                   Clinical Impression       Row Name 06/08/23 1431          Pain    Pretreatment Pain Rating 4/10  -BR     Posttreatment Pain Rating 5/10  -BR     Pain Location other (see comments)  Pt reports BLE as sore to the touch.  -BR     Pain Location - epigastrium  -BR       Row Name 06/08/23 1443 06/08/23 1431       Plan of Care Review    Plan of Care Reviewed With -- patient;sibling  -BR    Outcome Evaluation Pt presents as an 83 y/o M  "admitted to [facility] on 6/7/23 with complaints of fever, chills, weakness, falls at home, nausea vomiting, diarrhea, and episodes of shortness of breath and choking. Pt has triggered sepsis and tick borne diseases are being ruled out. Hematology is following pt. CT head negative. CXR negative. CT abdomen shows inflammation. Pt has elevated liver transaminase and thrombocytopenia. Pt c/o feeling terrible with soreness in his legs to the touch. Pt A and O x 4. Gross BLE strength of 3-/5 and early fatigue with minimal exertion noted. At baseline, pt lives with spouse who has sternal precautions from recent CABG and is beginning cardiac rehab; spouse will be unable to physically assist pt at home. Pt is driving and independent in community without AD in [facility] with one step entry. Today, pt requiring min assist for transfers and 6 feet of lateral side-stepping with rolling walker. Pt requires more than one attempt to achieve standing and feels as if his legs will \"buckle\" in standing. Pt is far below his baseline for mobility. PT will follow pt. Occupational Therapy Eval is recommended- hospitalist aware. PT recommendation at this time is Skilled Nursing Facility.  -BR --      Row Name 06/08/23 1431          Therapy Assessment/Plan (PT)    Rehab Potential (PT) good, to achieve stated therapy goals  -BR     Criteria for Skilled Interventions Met (PT) yes;meets criteria;skilled treatment is necessary  -BR     Therapy Frequency (PT) 5 times/wk  -BR     Predicted Duration of Therapy Intervention (PT) until D/C  -BR       Row Name 06/08/23 1431          Vital Signs    Pre Systolic BP Rehab 120  -BR     Pre Treatment Diastolic BP 62  -BR     Pretreatment Heart Rate (beats/min) 92  -BR     Pre SpO2 (%) 95  -BR     O2 Delivery Pre Treatment room air  -BR     Intra SpO2 (%) 95  -BR     O2 Delivery Intra Treatment room air  -BR     Post SpO2 (%) 95  -BR     O2 Delivery Post Treatment room air  -BR     Pre Patient Position Supine  -BR     " Intra Patient Position Standing  -BR     Post Patient Position Supine  -BR       Row Name 06/08/23 1431          Positioning and Restraints    Pre-Treatment Position in bed  -BR     Post Treatment Position bed  -BR     In Bed notified nsg;side rails up x3;exit alarm on;encouraged to call for assist;call light within reach;with family/caregiver;fowlers  -BR               User Key  (r) = Recorded By, (t) = Taken By, (c) = Cosigned By      Initials Name Provider Type    Evelyn Logan PT Physical Therapist                   Outcome Measures       Row Name 06/08/23 1444          How much help from another person do you currently need...    Turning from your back to your side while in flat bed without using bedrails? 4  -BR     Moving from lying on back to sitting on the side of a flat bed without bedrails? 3  -BR     Moving to and from a bed to a chair (including a wheelchair)? 3  -BR     Standing up from a chair using your arms (e.g., wheelchair, bedside chair)? 3  -BR     Climbing 3-5 steps with a railing? 2  -BR     To walk in hospital room? 2  -BR     AM-PAC 6 Clicks Score (PT) 17  -BR     Highest level of mobility 5 --> Static standing  -BR       Row Name 06/08/23 1444          Functional Assessment    Outcome Measure Options AM-PAC 6 Clicks Basic Mobility (PT)  -BR               User Key  (r) = Recorded By, (t) = Taken By, (c) = Cosigned By      Initials Name Provider Type    Evelyn Logan PT Physical Therapist                                 Physical Therapy Education       Title: PT OT SLP Therapies (Done)       Topic: Physical Therapy (Done)       Point: Mobility training (Done)       Learning Progress Summary             Patient Acceptance, E,D, VU,DU by FREDERIC at 6/8/2023 1445   Family Acceptance, E,D, VU,DU by FREDERIC at 6/8/2023 1445                         Point: Home exercise program (Done)       Learning Progress Summary             Patient Acceptance, E,D, VU,DU by FREDERIC at 6/8/2023 1445   Family  Acceptance, E,D, VU,DU by BR at 6/8/2023 1445                         Point: Body mechanics (Done)       Learning Progress Summary             Patient Acceptance, E,D, VU,DU by BR at 6/8/2023 1445   Family Acceptance, E,D, VU,DU by BR at 6/8/2023 1445                         Point: Precautions (Done)       Learning Progress Summary             Patient Acceptance, E,D, VU,DU by BR at 6/8/2023 1445   Family Acceptance, E,D, VU,DU by BR at 6/8/2023 1445                                         User Key       Initials Effective Dates Name Provider Type Discipline    BR 02/01/22 -  Evelyn Mancilla, PT Physical Therapist PT                  PT Recommendation and Plan  Planned Therapy Interventions (PT): balance training, bed mobility training, gait training, home exercise program, patient/family education, neuromuscular re-education, transfer training, stair training, strengthening, stretching, ROM (range of motion)  Plan of Care Reviewed With: patient, sibling  Outcome Evaluation: Pt presents as an 83 y/o M admitted to [facility] on 6/7/23 with complaints of fever, chills, weakness, falls at home, nausea vomiting, diarrhea, and episodes of shortness of breath and choking. Pt has triggered sepsis and tick borne diseases are being ruled out. Hematology is following pt. CT head negative. CXR negative. CT abdomen shows inflammation. Pt has elevated liver transaminase and thrombocytopenia. Pt c/o feeling terrible with soreness in his legs to the touch. Pt A and O x 4. Gross BLE strength of 3-/5 and early fatigue with minimal exertion noted. At baseline, pt lives with spouse who has sternal precautions from recent CABG and is beginning cardiac rehab; spouse will be unable to physically assist pt at home. Pt is driving and independent in community without AD in [facility] with one step entry. Today, pt requiring min assist for transfers and 6 feet of lateral side-stepping with rolling walker. Pt requires more than one attempt to achieve  "standing and feels as if his legs will \"buckle\" in standing. Pt is far below his baseline for mobility. PT will follow pt. Occupational Therapy Eval is recommended- hospitalist aware. PT recommendation at this time is Skilled Nursing Facility.     Time Calculation:    PT Charges       Row Name 06/08/23 1445             Time Calculation    Start Time 1302  -BR      Stop Time 1335  -BR      Time Calculation (min) 33 min  -BR      PT Received On 06/08/23  -BR      PT - Next Appointment 06/09/23  -BR      PT Goal Re-Cert Due Date 06/22/23  -BR         Time Calculation- PT    Total Timed Code Minutes- PT 0 minute(s)  -BR                User Key  (r) = Recorded By, (t) = Taken By, (c) = Cosigned By      Initials Name Provider Type    BR Evelyn Mancilla PT Physical Therapist                  Therapy Charges for Today       Code Description Service Date Service Provider Modifiers Qty    27012284723 HC PT EVAL MOD COMPLEXITY 4 6/8/2023 Evelyn Mancilla, FÁTIMA GP 1            PT G-Codes  Outcome Measure Options: AM-PAC 6 Clicks Basic Mobility (PT)  AM-PAC 6 Clicks Score (PT): 17  PT Discharge Summary  Anticipated Discharge Disposition (PT): skilled nursing facility    Evelyn Mancilla PT  6/8/2023    "

## 2023-06-08 NOTE — THERAPY EVALUATION
Acute Care - Speech Language Pathology   Swallow Initial Evaluation  Du     Patient Name: Mark Marie  : 1940  MRN: [id number]  Today's Date: 2023               Admit Date: 2023    Visit Dx:     ICD-10-CM ICD-9-CM   1. Sepsis, due to unspecified organism, unspecified whether acute organ dysfunction present  A41.9 038.9     995.91   2. Bandemia  D72.825 288.66   3. Dehydration  E86.0 276.51   4. Acute kidney injury  N17.9 584.9   5. Thrombocytopenia  D69.6 287.5     Patient Active Problem List   Diagnosis    Right calf pain    Disorder associated with type 2 diabetes mellitus    Hyperlipidemia    Hypertension    Hypothyroidism    Wellness examination    Chronic bilateral low back pain without sciatica    Memory change    Medicare annual wellness visit, subsequent    Clinical diagnosis of COVID-19    Lumbar radiculopathy    Basal cell carcinoma, face    Crushing injury of right middle finger    Fatigue    Need for hepatitis C screening test    Pneumonia due to infectious organism    Dyspnea    Confusion    Sepsis, due to unspecified organism, unspecified whether acute organ dysfunction present    Febrile illness    Thrombocytopenia    Elevated liver transaminase level    Dysphagia    Acute kidney injury    History of prostate cancer     Past Medical History:   Diagnosis Date    Cataract removed 16 years ago    Diabetes mellitus     HL (hearing loss) have worn hearing aids for 16 years    Hyperlipidemia     Hypertension     Hypothyroidism     Joint pain     Low back pain     Prostate cancer     Squamous cell carcinoma of skin     right temple     Past Surgical History:   Procedure Laterality Date    HEMORROIDECTOMY      HERNIA REPAIR      inguinal and umbilical    KNEE SURGERY Bilateral     trimmed cartilage    VASECTOMY  50 years ago       SLP Recommendation and Plan  SLP Swallowing Diagnosis: functional oral phase, R/O pharyngeal dysphagia (23 4147)  SLP Diet Recommendation: regular  "textures, thin liquids (06/08/23 1427)  Recommended Precautions and Strategies: upright posture during/after eating, small bites of food and sips of liquid, general aspiration precautions, fatigue precautions, reflux precautions (06/08/23 1427)  SLP Rec. for Method of Medication Administration: as tolerated (06/08/23 1427)     Monitor for Signs of Aspiration: yes, notify SLP if any concerns (06/08/23 1427)     Swallow Criteria for Skilled Therapeutic Interventions Met: demonstrates skilled criteria (06/08/23 1427)     Rehab Potential/Prognosis, Swallowing: good, to achieve stated therapy goals (06/08/23 1427)  Therapy Frequency (Swallow): PRN (06/08/23 1427)  Predicted Duration Therapy Intervention (Days): until discharge (06/08/23 1427)          SWALLOW EVALUATION (last 72 hours)       SLP Adult Swallow Evaluation       Row Name 06/08/23 1427       Rehab Evaluation    Document Type evaluation  -LF    Subjective Information no complaints  -LF    Patient Observations alert;cooperative;agree to therapy  -LF    Patient Effort good  -LF    Symptoms Noted During/After Treatment none  -LF       General Information    Patient Profile Reviewed yes  -LF    Pertinent History Of Current Problem Pt is an 83 y/o male who presents to [facility] d/t fever, chills, weakness, falls at home, nausea, vomiting, diarrhea, SOB, and dysphagia. CT of head and CXR negative. At bedside, pt awake and alert and reports dysphagia episodes as \"spasms\" and points to his abdomen He denies oropharyngeal or esophageal dysphagia s/s.  -LF    Current Method of Nutrition NPO  -LF    Precautions/Limitations, Vision WFL;for purposes of eval  -LF    Precautions/Limitations, Hearing hearing impairment, bilaterally;other (see comments)  hearing aides not available  -LF    Prior Level of Function-Swallowing no diet consistency restrictions  -LF    Plans/Goals Discussed with patient  -LF       Oral Motor Structure and Function    Dentition Assessment natural, " present and adequate  -LF    Secretion Management WNL/WFL  -LF    Mucosal Quality moist, healthy  -       Oral Musculature and Cranial Nerve Assessment    Oral Motor General Assessment WFL  -LF       General Eating/Swallowing Observations    Respiratory Support Currently in Use room air  -    Eating/Swallowing Skills self-fed;appropriate self-feeding skills observed  -    Positioning During Eating upright 90 degree;upright in bed  -    Utensils Used spoon;straw  -    Consistencies Trialed regular textures;mixed consistency;pureed;thin liquids  -       Clinical Swallow Eval    Clinical Swallow Evaluation Summary Pt observed with trials of water by straw, puree, soft to chew, and regular solids. Pt self fed all trials and independently took small bites/sips.Oral phase characterized by adequate mastication of soft solids. Slow but functional mastication of regular solids. Adequate labial seal with no anterior loss. Timely oral transit. Pharyngeal phase characterized by a suspected timely swallow with no overt s/s of aspiration observed at any time. Mild oral stasis following regular solids, which pt independently clears w/ a lingual sweep or liquid wash. Recommend pt initiate a regular and thin liquid diet w/ safe swallow and aspiration precautions. ST will continue to follow to ensure diet tolerance and make further recs as indicated.  -       Swallowing Quality of Life Assessment    Education and counseling provided Signs of aspiration;Risks of aspiration;Safest diet options;Oral care recommendations and rationale;Aspiration precautions;Compensatory strategy recommendations and rationale  -       SLP Evaluation Clinical Impression    SLP Swallowing Diagnosis functional oral phase;R/O pharyngeal dysphagia  -    Rehab Potential/Prognosis, Swallowing good, to achieve stated therapy goals  -    Swallow Criteria for Skilled Therapeutic Interventions Met demonstrates skilled criteria  -        Recommendations    Therapy Frequency (Swallow) PRN  -LF    Predicted Duration Therapy Intervention (Days) until discharge  -    SLP Diet Recommendation regular textures;thin liquids  -    Recommended Precautions and Strategies upright posture during/after eating;small bites of food and sips of liquid;general aspiration precautions;fatigue precautions;reflux precautions  -    Oral Care Recommendations Oral Care BID/PRN;Oral Care before breakfast, after meals and PRN  -    SLP Rec. for Method of Medication Administration as tolerated  -    Monitor for Signs of Aspiration yes;notify SLP if any concerns  -LF       Swallow Goals (SLP)    Swallow LTGs Swallow Long Term Goal (free text)  -LF    Swallow STGs diet tolerance goal selection (SLP)  -LF    Diet Tolerance Goal Selection (SLP) Swallow Short Term Goal 1  -LF       (LTG) Swallow    (LTG) Swallow The patient will maximize swallow function for least restrictive po diet, exhibiting no complications associated with dysphagia, adequate po intake, and demonstrating independent use of safe swallow compensations.  -    Time Frame (Swallow Long Term Goal) by discharge  -LF       (STG) Swallow 1    (STG) Swallow 1 The patient will participate in a full meal assessment to determine safety and adequacy of recommended diet, independent use of safe swallow compensations, and additional goals/recommendations to follow  -LF    Time Frame (Swallow Short Term Goal 1) 1 week  -              User Key  (r) = Recorded By, (t) = Taken By, (c) = Cosigned By      Initials Name Effective Dates     Simran Chiu SLP 06/16/21 -                     EDUCATION  The patient has been educated in the following areas:   Dysphagia (Swallowing Impairment) Oral Care/Hydration.                Time Calculation:                XIMENA Johnson  6/8/2023

## 2023-06-08 NOTE — PLAN OF CARE
"Goal Outcome Evaluation:     Pt is an 81 y/o male who presents to [facility] d/t fever, chills, weakness, falls at home, nausea, vomiting, diarrhea, SOB, and dysphagia. CT of head and CXR negative. At bedside, pt awake and alert and reports dysphagia episodes as \"spasms\" and points to his abdomen. He denies oropharyngeal or esophageal dysphagia s/s.     Pt observed with trials of water by straw, puree, soft to chew, and regular solids. Pt self fed all trials and independently took small bites/sips.Oral phase characterized by adequate mastication of soft solids. Slow but functional mastication of regular solids. Adequate labial seal with no anterior loss. Timely oral transit. Pharyngeal phase characterized by a suspected timely swallow with no overt s/s of aspiration observed at any time. Mild oral stasis following regular solids, which pt independently clears w/ a lingual sweep or liquid wash. Recommend pt initiate a regular and thin liquid diet w/ safe swallow and aspiration precautions. ST will continue to follow to ensure diet tolerance and make further recs as indicated.        "

## 2023-06-08 NOTE — PLAN OF CARE
Problem: Adult Inpatient Plan of Care  Goal: Plan of Care Review  Outcome: Ongoing, Progressing  Goal: Patient-Specific Goal (Individualized)  Outcome: Ongoing, Progressing  Goal: Absence of Hospital-Acquired Illness or Injury  Outcome: Ongoing, Progressing  Intervention: Identify and Manage Fall Risk  Recent Flowsheet Documentation  Taken 6/8/2023 0200 by Nilda Steven RN  Safety Promotion/Fall Prevention:   activity supervised   clutter free environment maintained  Taken 6/8/2023 0000 by Nilda Steven RN  Safety Promotion/Fall Prevention:   activity supervised   clutter free environment maintained   fall prevention program maintained   room organization consistent   safety round/check completed   lighting adjusted  Taken 6/7/2023 2200 by Nilda Steven RN  Safety Promotion/Fall Prevention:   activity supervised   clutter free environment maintained   fall prevention program maintained   safety round/check completed  Intervention: Prevent Infection  Recent Flowsheet Documentation  Taken 6/8/2023 0200 by Nilda Steven RN  Infection Prevention: rest/sleep promoted  Taken 6/8/2023 0000 by Nilda Steven RN  Infection Prevention:   rest/sleep promoted   hand hygiene promoted  Taken 6/7/2023 2200 by Nilda Steven RN  Infection Prevention: single patient room provided  Goal: Optimal Comfort and Wellbeing  Outcome: Ongoing, Progressing  Intervention: Provide Person-Centered Care  Recent Flowsheet Documentation  Taken 6/8/2023 0352 by Nilda Steven RN  Trust Relationship/Rapport:   care explained   choices provided  Taken 6/8/2023 0000 by Nilda Steven RN  Trust Relationship/Rapport:   care explained   choices provided  Goal: Readiness for Transition of Care  Outcome: Ongoing, Progressing     Problem: Fall Injury Risk  Goal: Absence of Fall and Fall-Related Injury  Outcome: Ongoing, Progressing  Intervention: Identify and Manage Contributors  Recent Flowsheet Documentation  Taken 6/8/2023  0200 by Nilda Steven RN  Medication Review/Management: medications reviewed  Taken 6/8/2023 0000 by Nilda Steven RN  Medication Review/Management: medications reviewed  Taken 6/7/2023 2200 by Nilda Steven RN  Medication Review/Management: medications reviewed  Intervention: Promote Injury-Free Environment  Recent Flowsheet Documentation  Taken 6/8/2023 0200 by Nilda Steven RN  Safety Promotion/Fall Prevention:   activity supervised   clutter free environment maintained  Taken 6/8/2023 0000 by Nilda Steven RN  Safety Promotion/Fall Prevention:   activity supervised   clutter free environment maintained   fall prevention program maintained   room organization consistent   safety round/check completed   lighting adjusted  Taken 6/7/2023 2200 by Nilda Steven RN  Safety Promotion/Fall Prevention:   activity supervised   clutter free environment maintained   fall prevention program maintained   safety round/check completed     Problem: Adjustment to Illness (Sepsis/Septic Shock)  Goal: Optimal Coping  Outcome: Ongoing, Progressing  Intervention: Optimize Psychosocial Adjustment to Illness  Recent Flowsheet Documentation  Taken 6/8/2023 0352 by Nilda Steven RN  Family/Support System Care: self-care encouraged  Taken 6/8/2023 0000 by Nilda Steven RN  Family/Support System Care: self-care encouraged     Problem: Bleeding (Sepsis/Septic Shock)  Goal: Absence of Bleeding  Outcome: Ongoing, Progressing     Problem: Infection Progression (Sepsis/Septic Shock)  Goal: Absence of Infection Signs and Symptoms  Outcome: Ongoing, Progressing  Intervention: Initiate Sepsis Management  Recent Flowsheet Documentation  Taken 6/8/2023 0200 by Nilda Steven RN  Infection Prevention: rest/sleep promoted  Taken 6/8/2023 0000 by Nilda Steven RN  Infection Prevention:   rest/sleep promoted   hand hygiene promoted  Taken 6/7/2023 2200 by Nilda Steven RN  Infection Prevention: single patient  room provided     Problem: Glycemic Control Impaired (Sepsis/Septic Shock)  Goal: Blood Glucose Level Within Desired Range  Outcome: Ongoing, Progressing     Problem: Skin Injury Risk Increased  Goal: Skin Health and Integrity  Outcome: Ongoing, Progressing  Intervention: Optimize Skin Protection  Recent Flowsheet Documentation  Taken 6/8/2023 0352 by Nilda Steven RN  Pressure Reduction Techniques: frequent weight shift encouraged  Taken 6/8/2023 0000 by Nilda Steven RN  Pressure Reduction Techniques: frequent weight shift encouraged     Problem: Hypertension Comorbidity  Goal: Blood Pressure in Desired Range  Outcome: Ongoing, Progressing  Intervention: Maintain Blood Pressure Management  Recent Flowsheet Documentation  Taken 6/8/2023 0200 by Nilda Steven RN  Medication Review/Management: medications reviewed  Taken 6/8/2023 0000 by Nilda Steven RN  Medication Review/Management: medications reviewed  Taken 6/7/2023 2200 by Nilda Steven RN  Medication Review/Management: medications reviewed   Goal Outcome Evaluation:

## 2023-06-08 NOTE — PLAN OF CARE
"Goal Outcome Evaluation:  Plan of Care Reviewed With: patient, sibling  Pt presents as an 83 y/o M admitted to [facility] on 6/7/23 with complaints of fever, chills, weakness, falls at home, nausea vomiting, diarrhea, and episodes of shortness of breath and choking. Pt has triggered sepsis and tick borne diseases are being ruled out. Hematology is following pt. CT head negative. CXR negative. CT abdomen shows inflammation. Pt has elevated liver transaminase and thrombocytopenia. Pt c/o feeling terrible with soreness in his legs to the touch. Pt A and O x 4. Gross BLE strength of 3-/5 and early fatigue with minimal exertion noted. At baseline, pt lives with spouse who has sternal precautions from recent CABG and is beginning cardiac rehab; spouse will be unable to physically assist pt at home. Pt is driving and independent in community without AD in [facility] with one step entry. Today, pt requiring min assist for transfers and 6 feet of lateral side-stepping with rolling walker. Pt requires more than one attempt to achieve standing and feels as if his legs will \"buckle\" in standing. Pt is far below his baseline for mobility. PT will follow pt. Occupational Therapy Eval is recommended- hospitalist aware. PT recommendation at this time is Skilled Nursing Facility.                     "

## 2023-06-08 NOTE — CONSULTS
Hematology/Oncology Inpatient Consultation    Patient name: Mark Marie  : 1940  MRN: [id number]  Referring Provider: Dr. Clayton  Reason for Consultation: Thrombocytopenia    Chief complaint: Fever    History of present illness:      Mark Marie is a 82 y.o. male who presented to [facility] on 2023 at the direction of his primary care physician with complaints of fever, chills, weakness and falls.  Past medical history of type 2 diabetes mellitus, hypertension, hyperlipidemia, hypothyroidism, dementia prostate cancer in , squamous cell carcinoma of skin.  Patient reported he had been feeling poorly for over a week.  In addition to fever and chills he also reported nausea and diarrhea.  Intermittent episodes of dysphagia, difficulty breathing, hiccups and congestion.  Denied any abdominal pain or urinary symptoms.    Evaluation in the ED: WBC 3.90, hemoglobin 15 g/dL, MCV 91.8, platelets 43,000, bands 22%, lymphocytes 5%, plasma cells 1%.  BUN 27, creatinine 1.29, , ALT 53, negative respiratory panel, chest x-ray showing no acute cardiopulmonary disease, blood cultures drawn and pending, CT of the head without contrast showing no acute intracranial abnormality; age-appropriate volume loss; mild to moderate amount of low-density periventricular and subcortical white matter most commonly seen with chronic small vessel ischemic change.  UA positive for large amount of blood, trace leukocytes CT of the abdomen and pelvis without contrast showed possible enteritis, diverticulosis without diverticulitis, small hiatal hernia, hepatomegaly.  PT 12.0, INR 1.13.    23  Hematology/Oncology was consulted for acute thrombocytopenia.  Patient had platelets presented with platelet count of 43,000 with bandemia.  He also has had a febrile illness no obvious source at this time..  Patient has a history of underlying mild dementia    He/She  has a past medical history of Cataract  (removed 16 years ago), Diabetes mellitus, HL (hearing loss) (have worn hearing aids for 16 years), Hyperlipidemia, Hypertension, Hypothyroidism, Joint pain, Low back pain, Prostate cancer (2011), and Squamous cell carcinoma of skin.    PCP: Diana Linares MD    History:  Past Medical History:   Diagnosis Date    Cataract removed 16 years ago    Diabetes mellitus     HL (hearing loss) have worn hearing aids for 16 years    Hyperlipidemia     Hypertension     Hypothyroidism     Joint pain     Low back pain     Prostate cancer 2011    Squamous cell carcinoma of skin     right temple   ,   Past Surgical History:   Procedure Laterality Date    HEMORROIDECTOMY      HERNIA REPAIR      inguinal and umbilical    KNEE SURGERY Bilateral     trimmed cartilage    VASECTOMY  50 years ago   ,   Family History   Problem Relation Age of Onset    Heart disease Father     Hypertension Father     Hypertension Mother     Other Mother         Alzheimers    Cancer Brother         prostate    Cancer Brother         prostate    Hypertension Brother     Cancer Daughter    ,   Social History     Tobacco Use    Smoking status: Never    Smokeless tobacco: Never   Vaping Use    Vaping Use: Never used   Substance Use Topics    Alcohol use: Not Currently     Comment: rarely;  caffeine 1c qd    Drug use: No   ,   Medications Prior to Admission   Medication Sig Dispense Refill Last Dose    donepezil (ARICEPT) 5 MG tablet TAKE 1 TABLET BY MOUTH EVERY DAY AT NIGHT 90 tablet 3     levothyroxine (SYNTHROID, LEVOTHROID) 25 MCG tablet TAKE 1 TABLET BY MOUTH EVERY DAY 90 tablet 3     losartan (COZAAR) 50 MG tablet TAKE 1 TABLET BY MOUTH EVERY DAY 90 tablet 3     metFORMIN (GLUCOPHAGE) 500 MG tablet TAKE 1 TABLET BY MOUTH TWICE A  tablet 3     promethazine (PHENERGAN) 25 MG tablet Take 1 tablet by mouth Every 6 (Six) Hours As Needed for Nausea or Vomiting.       simvastatin (ZOCOR) 80 MG tablet TAKE 1/2 TABLET BY MOUTH EVERY DAY 45 tablet 7      tamsulosin (FLOMAX) 0.4 MG capsule 24 hr capsule Take 1 capsule by mouth Daily.       traMADol (ULTRAM) 50 MG tablet TAKE 1 TABLET BY MOUTH EVERY 6 HOURS AS NEEDED FOR MODERATE PAIN 120 tablet 0    , Scheduled Meds:  acetaminophen, 650 mg, Oral, Once  doxycycline, 100 mg, Intravenous, Q12H  insulin lispro, 2-7 Units, Subcutaneous, 4x Daily AC & at Bedtime  piperacillin-tazobactam, 3.375 g, Intravenous, Once  piperacillin-tazobactam, 3.375 g, Intravenous, Q8H  sodium chloride, 10 mL, Intravenous, Q12H    , Continuous Infusions:  sodium chloride, 100 mL/hr, Last Rate: 100 mL/hr (06/08/23 1340)    , PRN Meds:    dextrose    dextrose    glucagon (human recombinant)    ondansetron    polyethylene glycol    sodium chloride    sodium chloride    sodium chloride   Allergies:  Ramipril    Subjective     ROS:  Review of Systems   Constitutional:  Positive for fatigue. Negative for chills and fever.   HENT:  Negative for congestion, drooling, ear discharge, rhinorrhea, sinus pressure and tinnitus.    Eyes:  Negative for photophobia, pain and discharge.   Respiratory:  Negative for apnea, choking and stridor.    Cardiovascular:  Negative for palpitations.   Gastrointestinal:  Negative for abdominal distention, abdominal pain and anal bleeding.   Endocrine: Negative for polydipsia and polyphagia.   Genitourinary:  Negative for decreased urine volume, flank pain and genital sores.   Musculoskeletal:  Negative for gait problem, neck pain and neck stiffness.   Skin:  Negative for color change, rash and wound.   Neurological:  Negative for tremors, seizures, syncope, facial asymmetry and speech difficulty.   Hematological:  Negative for adenopathy.   Psychiatric/Behavioral:  Negative for agitation, confusion, hallucinations and self-injury. The patient is not hyperactive.       Objective   Vital Signs:   /62 (BP Location: Left arm, Patient Position: Lying)   Pulse 92   Temp 100.4 °F (38 °C) (Oral)   Resp 18   Ht 162.6 cm  "(64\")   Wt 85.2 kg (187 lb 13.3 oz)   SpO2 95%   BMI 32.24 kg/m²     Physical Exam: (performed by MD)  Physical Exam  Vitals and nursing note reviewed.   Constitutional:       General: He is in acute distress.      Appearance: He is ill-appearing. He is not diaphoretic.   HENT:      Head: Normocephalic and atraumatic.   Eyes:      General: No scleral icterus.        Right eye: No discharge.         Left eye: No discharge.      Conjunctiva/sclera: Conjunctivae normal.   Neck:      Thyroid: No thyromegaly.   Cardiovascular:      Rate and Rhythm: Normal rate and regular rhythm.      Heart sounds: Normal heart sounds.     No friction rub. No gallop.   Pulmonary:      Effort: Pulmonary effort is normal. No respiratory distress.      Breath sounds: No stridor. No wheezing.   Abdominal:      General: Bowel sounds are normal.      Palpations: Abdomen is soft. There is no mass.      Tenderness: There is no abdominal tenderness. There is no guarding or rebound.   Musculoskeletal:         General: No tenderness. Normal range of motion.      Cervical back: Normal range of motion and neck supple.   Lymphadenopathy:      Cervical: No cervical adenopathy.   Skin:     General: Skin is warm.      Findings: Bruising present. No erythema or rash.   Neurological:      Mental Status: He is alert and oriented to person, place, and time.      Motor: No abnormal muscle tone.   Psychiatric:         Behavior: Behavior normal.       Results Review:  Lab Results (last 48 hours)       Procedure Component Value Units Date/Time    POC Glucose Once [[id number]]  (Normal) Collected: 06/08/23 0718    Specimen: Blood Updated: 06/08/23 0719     Glucose 90 mg/dL      Comment: Serial Number: [id number]:  [id number]       POC Glucose Once [[id number]]  (Normal) Collected: 06/08/23 0611    Specimen: Blood Updated: 06/08/23 0613     Glucose 92 mg/dL      Comment: Serial Number: [id number]:  [id number]       Manual Differential [[id number]]  " (Abnormal) Collected: 06/07/23 2246    Specimen: Blood Updated: 06/08/23 0108     Neutrophil % 67.0 %      Lymphocyte % 6.0 %      Monocyte % 2.0 %      Bands %  23.0 %      Metamyelocyte % 2.0 %      Neutrophils Absolute 2.61 10*3/mm3      Lymphocytes Absolute 0.17 10*3/mm3      Monocytes Absolute 0.06 10*3/mm3      nRBC 1.0 /100 WBC      Dacrocytes Slight/1+     Poikilocytes Slight/1+     RBC Fragments Slight/1+     WBC Morphology Normal     Platelet Estimate Decreased    Scan Slide [[id number]] Collected: 06/07/23 2246    Specimen: Blood Updated: 06/08/23 0108     Scan Slide --     Comment: See Manual Differential Results       CBC Auto Differential [[id number]]  (Abnormal) Collected: 06/07/23 2246    Specimen: Blood Updated: 06/08/23 0108     WBC 2.90 10*3/mm3      RBC 4.12 10*6/mm3      Hemoglobin 12.7 g/dL      Comment: Result checked           Hematocrit 37.9 %      MCV 92.0 fL      MCH 30.9 pg      MCHC 33.6 g/dL      RDW 13.1 %      RDW-SD 42.4 fl      MPV 8.8 fL      Platelets 36 10*3/mm3     Narrative:      The previously reported component NRBC is no longer being reported. Previous result was 0.0 /100 WBC (Reference Range: 0.0-0.2 /100 WBC) on 6/7/2023 at 2358 EDT.    Comprehensive Metabolic Panel [[id number]]  (Abnormal) Collected: 06/07/23 2246    Specimen: Blood Updated: 06/08/23 0015     Glucose 108 mg/dL      BUN 21 mg/dL      Creatinine 1.01 mg/dL      Sodium 138 mmol/L      Potassium 3.6 mmol/L      Chloride 106 mmol/L      CO2 22.0 mmol/L      Calcium 7.2 mg/dL      Total Protein 5.3 g/dL      Albumin 2.9 g/dL      ALT (SGPT) 49 U/L      AST (SGOT) 220 U/L      Alkaline Phosphatase 43 U/L      Total Bilirubin 0.4 mg/dL      Globulin 2.4 gm/dL      A/G Ratio 1.2 g/dL      BUN/Creatinine Ratio 20.8     Anion Gap 10.0 mmol/L      eGFR 75.2 mL/min/1.73     Narrative:      GFR Normal >60  Chronic Kidney Disease <60  Kidney Failure <15    The GFR formula is only valid for adults with stable renal function  between ages 18 and 70.    Ethanol [[id number]] Collected: 06/07/23 2246    Specimen: Blood Updated: 06/08/23 0015     Ethanol % <0.010 %     Narrative:      Plasma Ethanol Clinical Symptoms:    ETOH (%)               Clinical Symptom  .01-.05              No apparent influence  .03-.12              Euphoria, Diminished judgment and attention   .09-.25              Impaired comprehension, Muscle incoordination  .18-.30              Confusion, Staggered gait, Slurred speech  .25-.40              Markedly decreased response to stimuli, unable to stand or                        walk, vomitting, sleep or stupor  .35-.50              Comatose, Anesthesia, Subnormal body temperature        Protime-INR [[id number]]  (Abnormal) Collected: 06/07/23 2246    Specimen: Blood Updated: 06/07/23 2357     Protime 12.0 Seconds      INR 1.13    [facility] (IgG / M) [[id number]] Collected: 06/07/23 2246    Specimen: Blood Updated: 06/07/23 2341    Lyme Disease Total Antibody With Reflex to Immunoassay [[id number]] Collected: 06/07/23 2246    Specimen: Blood Updated: 06/07/23 2341    CMV IgG IgM [[id number]] Collected: 06/07/23 2246    Specimen: Blood Updated: 06/07/23 2341    CMV DNA, Quantitative, PCR [[id number]] Collected: 06/07/23 2246    Specimen: Blood Updated: 06/07/23 2341    Ehrlichia Profile DNA PCR [[id number]] Collected: 06/07/23 2246    Specimen: Blood Updated: 06/07/23 2341    Shobha-Barr Virus DNA, Quantitative [[id number]] Collected: 06/07/23 2246    Specimen: Blood Updated: 06/07/23 2341    POC Glucose Once [[id number]]  (Normal) Collected: 06/07/23 2124    Specimen: Blood Updated: 06/07/23 2125     Glucose 105 mg/dL      Comment: Serial Number: [id number]:  [id number]       Shobha-Barr Virus VCA Antibody Panel [[id number]] Collected: 06/07/23 1553    Specimen: Blood Updated: 06/07/23 1933    Urinalysis, Microscopic Only - Straight Cath [[id number]]  (Abnormal) Collected: 06/07/23 8387    Specimen: Urine from  Straight Cath Updated: 06/07/23 1753     RBC, UA 0-2 /HPF      WBC, UA 0-2 /HPF      Comment: Urine culture not indicated.        Bacteria, UA None Seen /HPF      Squamous Epithelial Cells, UA 3-6 /HPF      Hyaline Casts, UA 7-12 /LPF      Granular Casts, UA 3-6 /LPF      Methodology Manual Light Microscopy    Urinalysis With Culture If Indicated - Straight Cath [[id number]]  (Abnormal) Collected: 06/07/23 1725    Specimen: Urine from Straight Cath Updated: 06/07/23 1743     Color, UA Dark Yellow     Comment: Result checked          Appearance, UA Clear     pH, UA 5.5     Specific Gravity, UA 1.025     Glucose, UA Negative     Ketones, UA 15 mg/dL (1+)     Bilirubin, UA Small (1+)     Comment: Confirmation testing is unavailable.  A serum bilirubin is recommended for further assessment.        Blood, UA Large (3+)     Protein, UA >=300 mg/dL (3+)     Leuk Esterase, UA Trace     Nitrite, UA Negative     Urobilinogen, UA 1.0 E.U./dL    Narrative:      In absence of clinical symptoms, the presence of pyuria, bacteria, and/or nitrites on the urinalysis result does not correlate with infection.    Manual Differential [[id number]]  (Abnormal) Collected: 06/07/23 1639    Specimen: Blood from Arm, Right Updated: 06/07/23 1717     Neutrophil % 60.0 %      Lymphocyte % 5.0 %      Monocyte % 10.0 %      Bands %  22.0 %      Metamyelocyte % 1.0 %      Atypical Lymphocyte % 1.0 %      Plasma Cells % 1.0 %      Neutrophils Absolute 3.20 10*3/mm3      Lymphocytes Absolute 0.23 10*3/mm3      Monocytes Absolute 0.39 10*3/mm3      RBC Morphology Normal     WBC Morphology Normal     Platelet Morphology Normal    CBC & Differential [[id number]]  (Abnormal) Collected: 06/07/23 1639    Specimen: Blood from Arm, Right Updated: 06/07/23 1717    Narrative:      The following orders were created for panel order CBC & Differential.  Procedure                               Abnormality         Status                     ---------                                -----------         ------                     CBC Auto Differential[[id number]]        Abnormal            Final result               Scan Slide[[id number]]                                       Final result                 Please view results for these tests on the individual orders.    CBC Auto Differential [[id number]]  (Abnormal) Collected: 06/07/23 1639    Specimen: Blood from Arm, Right Updated: 06/07/23 1717     WBC 3.90 10*3/mm3      RBC 4.80 10*6/mm3      Hemoglobin 15.0 g/dL      Hematocrit 44.1 %      MCV 91.8 fL      MCH 31.2 pg      MCHC 33.9 g/dL      RDW 13.5 %      RDW-SD 45.5 fl      MPV 8.7 fL      Platelets 43 10*3/mm3      Comment: Recollected to verify result       Scan Slide [[id number]] Collected: 06/07/23 1639    Specimen: Blood from Arm, Right Updated: 06/07/23 1717     Toxic Granulation Slight/1+     Vacuolated Neutrophils Slight/1+     Platelet Estimate Decreased     Large Platelets Slight/1+     Scan Slide --     Comment: See Manual Differential Results       Respiratory Panel PCR w/COVID-19(SARS-CoV-2) BHUPINDER/NADEEM/CAMPOS/PAD/COR/MAD/ROBERTO In-House, NP Swab in UTM/VTM, 3-4 HR TAT - Swab, Nasopharynx [[id number]]  (Normal) Collected: 06/07/23 1556    Specimen: Swab from Nasopharynx Updated: 06/07/23 1702     ADENOVIRUS, PCR Not Detected     Coronavirus 229E Not Detected     Coronavirus HKU1 Not Detected     Coronavirus NL63 Not Detected     Coronavirus OC43 Not Detected     COVID19 Not Detected     Human Metapneumovirus Not Detected     Human Rhinovirus/Enterovirus Not Detected     Influenza A PCR Not Detected     Influenza B PCR Not Detected     Parainfluenza Virus 1 Not Detected     Parainfluenza Virus 2 Not Detected     Parainfluenza Virus 3 Not Detected     Parainfluenza Virus 4 Not Detected     RSV, PCR Not Detected     Bordetella pertussis pcr Not Detected     Bordetella parapertussis PCR Not Detected     Chlamydophila pneumoniae PCR Not Detected     Mycoplasma pneumo by PCR Not  Detected    Narrative:      In the setting of a positive respiratory panel with a viral infection PLUS a negative procalcitonin without other underlying concern for bacterial infection, consider observing off antibiotics or discontinuation of antibiotics and continue supportive care. If the respiratory panel is positive for atypical bacterial infection (Bordetella pertussis, Chlamydophila pneumoniae, or Mycoplasma pneumoniae), consider antibiotic de-escalation to target atypical bacterial infection.    [place] Draw [[id number]] Collected: 06/07/23 1553    Specimen: Blood Updated: 06/07/23 1701    Narrative:      The following orders were created for panel order [place] Draw.  Procedure                               Abnormality         Status                     ---------                               -----------         ------                     Green Top (Gel)[[id number]]                                                             Lavender Top[[id number]]                                     Final result               Gold Top - SST[[id number]]                                   Final result               Light Blue Top[[id number]]                                   Final result                 Please view results for these tests on the individual orders.    Lavender Top [[id number]] Collected: 06/07/23 1553    Specimen: Blood Updated: 06/07/23 1701     Extra Tube hold for add-on     Comment: Auto resulted       Light Blue Top [[id number]] Collected: 06/07/23 1553    Specimen: Blood Updated: 06/07/23 1701     Extra Tube Hold for add-ons.     Comment: Auto resulted       Blood Culture - Blood, Arm, Right [[id number]] Collected: 06/07/23 1639    Specimen: Blood from Arm, Right Updated: 06/07/23 1642    Gold Top - SST [[id number]] Collected: 06/07/23 1553    Specimen: Blood Updated: 06/07/23 1638     Extra Tube hold    Comprehensive Metabolic Panel [[id number]]  (Abnormal) Collected: 06/07/23 1553    Specimen: Blood Updated:  06/07/23 1625     Glucose 124 mg/dL      BUN 27 mg/dL      Creatinine 1.29 mg/dL      Sodium 135 mmol/L      Potassium 3.8 mmol/L      Chloride 97 mmol/L      CO2 26.0 mmol/L      Calcium 8.6 mg/dL      Total Protein 6.6 g/dL      Albumin 3.7 g/dL      ALT (SGPT) 53 U/L      AST (SGOT) 222 U/L      Alkaline Phosphatase 56 U/L      Total Bilirubin 0.5 mg/dL      Globulin 2.9 gm/dL      A/G Ratio 1.3 g/dL      BUN/Creatinine Ratio 20.9     Anion Gap 12.0 mmol/L      eGFR 56.1 mL/min/1.73     Narrative:      GFR Normal >60  Chronic Kidney Disease <60  Kidney Failure <15    The GFR formula is only valid for adults with stable renal function between ages 18 and 70.    Blood Culture - Blood, Arm, Right [[id number]] Collected: 06/07/23 1553    Specimen: Blood from Arm, Right Updated: 06/07/23 1559    POC Lactate [[id number]]  (Normal) Collected: 06/07/23 1551    Specimen: Blood Updated: 06/07/23 1553     Lactate 1.0 mmol/L      Comment: Serial Number: [id number]:  [id number]                Pending Results: MMA, vitamin B12, folate, LDH, reticulocyte, haptoglobin, SPEP/SINDY, blood cultures, tick panel, CMV/EBV    Imaging Reviewed:   CT Abdomen Pelvis Without Contrast    Result Date: 6/7/2023  1.There are some mildly dilated loops of small bowel with mild wall thickening left upper quadrant. Question enteritis. 2.Diverticulosis without diverticulitis. 3.Small hiatal hernia. 4.Hepatomegaly. Electronically Signed: Norma Hathaway  6/7/2023 8:05 PM EDT  Workstation ID: [id number]    CT Head Without Contrast    Result Date: 6/7/2023  Impression: 1.No acute intracranial abnormality on head CT. Age-appropriate volume loss. Mild to moderate amount of low-density periventricular and subcortical white matter is most commonly seen with chronic small vessel ischemic change. 2.MRI is more sensitive to evaluate for acute or subacute infarct. Electronically Signed: Norma Hathaway  6/7/2023 5:19 PM EDT  Workstation ID: [id number]    XR Chest 1  View    Result Date: 6/7/2023  Impression: 1. No acute cardiopulmonary disease Electronically Signed: Joey Mckenzie  6/7/2023 4:22 PM EDT  Workstation ID: [id number]          Assessment & Plan   ASSESSMENT  Acute thrombocytopenia: Review of chart shows platelet count of 264,000 in February 2023.  Patient with platelet count of 43,000 upon presentation and now 36,000.  Normal hemoglobin and white blood cell count upon presentation but now 2.9 and 12.7 g/dL.  No known heparin exposure.  Patient with elevated LFTs and hepatomegaly on CT.  Febrile illness at presentation.  Additional work-up ordered to evaluate for hemolysis, nutritional deficiency, hematological malignancy.  Normal fibrinogen and decreased reticulocyte count.  Febrile illness: Infectious diseases been consulted.  Evaluation for EBV/CMV/tick panel in progress.  Blood cultures pending.  IV antibiotics in place.  Acute kidney injury: On IV hydration  Confusion and falls at home: CT of the head with no acute findings.  Multiple chronic conditions: Type 2 diabetes mellitus, hyperlipidemia, hypertension, hypothyroidism  History of prostate cancer in 2011, history of squamous cell carcinoma of the skin    PLANS:    Thrombocytopenia work-up ordered  Monitor CBC daily and transfuse for platelets less than 20,000 or for signs of active bleeding      Electronically signed by MARLEE Cooper, 06/08/23, 8:01 AM EDT.    Thank you for this consult. We will be happy to follow along with you.  Had a normal platelet count in February 2023 and presents this time with platelets of 43,000.  Patient also has a febrile illness most likely contributing to his acute thrombocytopenia.  This is also accompanied by anemia and so far his work-up shows decrease reticulocyte count which suggest bone marrow suppression.  He is being evaluated for possible tickborne illness or viral illness.  He also has elevated liver function test and very high LDH level.    Physical exam  significant for mild confusion, acutely ill-appearing otherwise unremarkable  Reviewed his labs, imaging studies progress notes  Work-up for anemia thrombocytopenia ongoing.  Suspect this is most likely due to bone marrow suppression from acute illness.  He has bandemia on his differential counts with occasional plasma cells and metamyelocytes which could also be due to a highly stressed bone marrow  We will continue to monitor his counts very closely  We will continue to follow along with you        Electronically signed by Hallie Valencia MD, 06/08/23, 5:07 PM EDT.

## 2023-06-08 NOTE — PROGRESS NOTES
[facility] Medicine Services   Daily Progress Note    Patient Name: Mark Marie  : 1940  MRN: [id number]  Primary Care Physician:  Diana Linares MD  Date of admission: 2023    Subjective      Admitted for febrile illness and thrombocytopenia in the setting of known tick exposure  Did okay overnight.  Tmax 103.2 °F.  Responding well to Tylenol.  Still having diffuse muscle aches.  Cleared by speech for modified diet.  Tickborne panel is pending.  Some enteritis on the CT of his abdomen and pelvis, and he remains on room air.  Abdomen is feeling a bit better today.  Awaiting hematology consultation.  Discussed the case with the bedside nursing staff. No other acute concerns.    Objective      Vitals:   Temp:  [99.5 °F (37.5 °C)-103.2 °F (39.6 °C)] 99.5 °F (37.5 °C)  Heart Rate:  [] 96  Resp:  [15-26] 17  BP: (101-148)/(56-72) 113/62    Physical Exam   GEN: Ill-appearing elderly gentleman, no acute distress  HEENT: NCAT, PERRLA, dry mucous membranes  NECK: Supple, midline trachea  CARD: RRR, tachycardia improved, no appreciable M/R/G, no peripheral edema  PULM: Fairly CTAB, occasional ronchus that clears with cough, non-distressed  ABD: soft, slight mid-abdominal tenderness to exam, normoactive bowel sounds throughout  SKIN: Warm, dry, slight allor  NEURO: Grossly intact, non-focal exam  PSYCH: Pleasant     Result Review    I have personally reviewed the results from the time of this admission to 2023 09:08 EDT and agree with these findings:  [x]  Laboratory  [x]  Microbiology  [x]  Radiology  [x]  EKG/Telemetry   [x]  Cardiology/Vascular   []  Pathology  []  Old records  []  Other:    CT Abdomen Pelvis Without Contrast    Result Date: 2023  CT ABDOMEN PELVIS WO CONTRAST Date of Exam: 2023 7:45 PM EDT Indication: Fever, history of recent nausea and vomiting. Right-sided abdominal pain. Comparison: None available. Technique: Axial CT images were obtained of the abdomen  and pelvis without the administration of contrast. Sagittal and coronal reconstructions were performed.  Automated exposure control and iterative reconstruction methods were used. Findings: Lower Thorax: There is mild linear opacity lung bases, scarring versus subsegmental atelectasis. Peritoneum: No free air or free fluid. There is hernia mesh material in the mid abdomen. There are sutures in the low pelvis which may be related to previous hernia repair as well. Appendix: Appendix is not well seen. No inflammatory findings in the right lower quadrant.. Kidneys: No hydronephrosis. No renal calculi. No focal renal lesions. Ureters: No obstructing calculi or mass. Urinary bladder: Urinary bladder has normal appearance on CT given degree of distention. Liver: No focal hepatic lesions. Hepatomegaly. Gallbladder and bile ducts: No gallstones. No bile duct dilatation. Gallbladder has normal appearance. Spleen: Spleen is normal size.  No focal splenic lesions. Adrenal glands: Unremarkable. Pancreas: No focal masses.  No pancreatic duct dilation. No surrounding inflammation. Abdominal aorta and Vascular Structures: No aneurysmal dilation. Moderate atherosclerotic disease. Stomach and Bowel:  Small hiatal hernia. There is some mildly dilated small bowel loops in the left upper quadrant with questionable wall thickening. The measure up to 2.9 cm. No transition point is seen. No significant bowel wall thickening.  Ligament of Treitz has normal anatomic position. There are numerous diverticuli in the sigmoid: And distal descending colon. There are less numerous seen in the transverse and ascending colon. None are acutely inflamed. Reproductive Organs: There are fiducial markers within the prostate gland. Lymph nodes: No pathologically enlarged lymph nodes. Soft tissues: Unremarkable. Osseous structures: No aggressive focal lytic or sclerotic osseous lesions. Evaluation of bowel and solid organs is limited without contrast  administration.     Impression: 1.There are some mildly dilated loops of small bowel with mild wall thickening left upper quadrant. Question enteritis. 2.Diverticulosis without diverticulitis. 3.Small hiatal hernia. 4.Hepatomegaly. Electronically Signed: Norma Hathaway  6/7/2023 8:05 PM EDT  Workstation ID: [id number]     Assessment & Plan      acetaminophen, 650 mg, Oral, Once  doxycycline, 100 mg, Intravenous, Q12H  insulin lispro, 2-7 Units, Subcutaneous, 4x Daily AC & at Bedtime  piperacillin-tazobactam, 3.375 g, Intravenous, Once  piperacillin-tazobactam, 3.375 g, Intravenous, Q8H  sodium chloride, 10 mL, Intravenous, Q12H       sodium chloride, 100 mL/hr, Last Rate: 100 mL/hr (06/07/23 2155)         Active Hospital Problems    Diagnosis  POA    **Sepsis, due to unspecified organism, unspecified whether acute organ dysfunction present [A41.9]  Yes    Febrile illness [R50.9]  Unknown    Thrombocytopenia [D69.6]  Unknown    Elevated liver transaminase level [R74.01]  Unknown    Dysphagia [R13.10]  Unknown    Acute kidney injury [N17.9]  Unknown    Confusion [R41.0]  Yes    History of prostate cancer [Z85.46]  Not Applicable    Memory change [R41.3]  Yes    Hyperlipidemia [E78.5]  Yes    Hypertension [I10]  Yes    Hypothyroidism [E03.9]  Yes      Resolved Hospital Problems   No resolved problems to display.       Plan:   Febrile illness with complications of elevated liver transaminase and thrombocytopenia.  -Differentials include tickborne illness versus viral illness such as cytomegalovirus or Shobha-Barr virus  -Patient did end up recalling taking one or two ticks off a couple of weeks ago.   -COVID 19 screen and respiratory viral DNA panel are negative  -Chest x-ray was unremarkable  -Blood cultures are negative thus far  -CT abdomen pelvis showing possible enteritis. I don't this is the primary source of all of his presenting symptoms/findings but certainly worth watching.  -Continue IV Zosyn in the interim  -IV  doxycycline to cover for tickborne illness  -Ehrlichia PCR and antibody panel  -Eloy mounted spotted fever antibody panel  -Lyme antibody panel  -CMV PCR and IgM  -Shobha-Barr virus PCR and antibody panel  -Add serum CK  -ESR/CRP/CK/CMP/Mg in the morning     Episodic periods of dysphagia with shortness of breath that last several seconds to several minutes  -Modified diet as per ST     Acute kidney injury  -Possible dehydration due to decreased oral intake  -Gentle hydration     Confusion at at home and on admission, along with weakness and falls  -Resolved  -The head CT did not show any acute findings     History of early dementia  -Continue donepezil when patient has been cleared by speech therapy     Type 2 diabetes  -Continue metformin   -Sliding scale insulin     Hyperlipidemia   -Continue simvastatin     Hypertension  -Continue losartan     Hypothyroidism  -Continue levothyroxine      History of prostate cancer  -Continue tamsulosin     PT/OT    DVT prophylaxis:  Mechanical DVT prophylaxis orders are present.    CODE STATUS:    Level Of Support Discussed With: Patient  Code Status (Patient has no pulse and is not breathing): CPR (Attempt to Resuscitate)  Medical Interventions (Patient has pulse or is breathing): Full Support  Release to patient: Routine Release    Disposition:  Meets in patient criteria. I would expect patient to be discharged 2-3 days from now at a minimum. May need rehab depending on therapy evaluations and hospital course.     Electronically signed by Thang Clayton MD, 06/08/23, 09:08 EDT.  [facility] Hospitalist Team

## 2023-06-09 PROBLEM — M62.82 NON-TRAUMATIC RHABDOMYOLYSIS: Status: ACTIVE | Noted: 2023-06-09

## 2023-06-09 LAB
ALBUMIN SERPL ELPH-MCNC: 2.4 G/DL (ref 2.9–4.4)
ALBUMIN SERPL-MCNC: 2.2 G/DL (ref 3.5–5.2)
ALBUMIN/GLOB SERPL: 0.8 G/DL
ALBUMIN/GLOB SERPL: 0.9 {RATIO} (ref 0.7–1.7)
ALP SERPL-CCNC: 42 U/L (ref 39–117)
ALPHA1 GLOB SERPL ELPH-MCNC: 0.3 G/DL (ref 0–0.4)
ALPHA2 GLOB SERPL ELPH-MCNC: 0.7 G/DL (ref 0.4–1)
ALT SERPL W P-5'-P-CCNC: 48 U/L (ref 1–41)
ANION GAP SERPL CALCULATED.3IONS-SCNC: 8 MMOL/L (ref 5–15)
AST SERPL-CCNC: 195 U/L (ref 1–40)
B BURGDOR IGG+IGM SER QL IA: NEGATIVE
B-GLOBULIN SERPL ELPH-MCNC: 0.7 G/DL (ref 0.7–1.3)
BILIRUB SERPL-MCNC: 0.3 MG/DL (ref 0–1.2)
BUN SERPL-MCNC: 13 MG/DL (ref 8–23)
BUN/CREAT SERPL: 14.8 (ref 7–25)
CALCIUM SPEC-SCNC: 7.1 MG/DL (ref 8.6–10.5)
CHLORIDE SERPL-SCNC: 108 MMOL/L (ref 98–107)
CK SERPL-CCNC: 3767 U/L (ref 20–200)
CMV IGG SERPL IA-ACNC: <0.6 U/ML (ref 0–0.59)
CMV IGM SERPL IA-ACNC: <30 AU/ML (ref 0–29.9)
CO2 SERPL-SCNC: 19 MMOL/L (ref 22–29)
CREAT SERPL-MCNC: 0.88 MG/DL (ref 0.76–1.27)
CRP SERPL-MCNC: 3.8 MG/DL (ref 0–0.5)
DEPRECATED RDW RBC AUTO: 45.1 FL (ref 37–54)
EBV EARLY AG: NEGATIVE
EBV NUCLEAR AG: POSITIVE
EBV VCA IGG: POSITIVE
EBV VCA IGM: NEGATIVE
EGFRCR SERPLBLD CKD-EPI 2021: 86.9 ML/MIN/1.73
EOSINOPHIL # BLD MANUAL: 0.02 10*3/MM3 (ref 0–0.4)
EOSINOPHIL NFR BLD MANUAL: 1 % (ref 0.3–6.2)
ERYTHROCYTE [DISTWIDTH] IN BLOOD BY AUTOMATED COUNT: 13.5 % (ref 12.3–15.4)
ERYTHROCYTE [SEDIMENTATION RATE] IN BLOOD: 8 MM/HR (ref 0–20)
GAMMA GLOB SERPL ELPH-MCNC: 0.9 G/DL (ref 0.4–1.8)
GLOBULIN SER CALC-MCNC: 2.6 G/DL (ref 2.2–3.9)
GLOBULIN UR ELPH-MCNC: 2.6 GM/DL
GLUCOSE BLDC GLUCOMTR-MCNC: 112 MG/DL (ref 70–105)
GLUCOSE BLDC GLUCOMTR-MCNC: 116 MG/DL (ref 70–105)
GLUCOSE BLDC GLUCOMTR-MCNC: 141 MG/DL (ref 70–105)
GLUCOSE BLDC GLUCOMTR-MCNC: 179 MG/DL (ref 70–105)
GLUCOSE SERPL-MCNC: 146 MG/DL (ref 65–99)
HCT VFR BLD AUTO: 35 % (ref 37.5–51)
HGB BLD-MCNC: 12 G/DL (ref 13–17.7)
IGA SERPL-MCNC: 417 MG/DL (ref 61–437)
IGG SERPL-MCNC: 814 MG/DL (ref 603–1613)
IGM SERPL-MCNC: 181 MG/DL (ref 15–143)
LAB AP CASE REPORT: NORMAL
LABORATORY COMMENT REPORT: ABNORMAL
LYMPHOCYTES # BLD MANUAL: 0.08 10*3/MM3 (ref 0.7–3.1)
LYMPHOCYTES NFR BLD MANUAL: 8 % (ref 5–12)
M PROTEIN SERPL ELPH-MCNC: ABNORMAL G/DL
MAGNESIUM SERPL-MCNC: 2 MG/DL (ref 1.6–2.4)
MCH RBC QN AUTO: 31 PG (ref 26.6–33)
MCHC RBC AUTO-ENTMCNC: 34.3 G/DL (ref 31.5–35.7)
MCV RBC AUTO: 90.1 FL (ref 79–97)
MONOCYTES # BLD: 0.17 10*3/MM3 (ref 0.1–0.9)
NEUTROPHILS # BLD AUTO: 1.79 10*3/MM3 (ref 1.7–7)
NEUTROPHILS NFR BLD MANUAL: 67 % (ref 42.7–76)
NEUTS BAND NFR BLD MANUAL: 18 % (ref 0–5)
PATH REPORT.FINAL DX SPEC: NORMAL
PLASMA CELL PREC NFR BLD MANUAL: 2 % (ref 0–0)
PLATELET # BLD AUTO: 46 10*3/MM3 (ref 140–450)
PMV BLD AUTO: 9.8 FL (ref 6–12)
POTASSIUM SERPL-SCNC: 3.2 MMOL/L (ref 3.5–5.2)
PROT PATTERN SERPL IFE-IMP: ABNORMAL
PROT SERPL-MCNC: 4.8 G/DL (ref 6–8.5)
PROT SERPL-MCNC: 5 G/DL (ref 6–8.5)
RBC # BLD AUTO: 3.88 10*6/MM3 (ref 4.14–5.8)
RBC MORPH BLD: NORMAL
SCAN SLIDE: NORMAL
SMALL PLATELETS BLD QL SMEAR: ABNORMAL
SODIUM SERPL-SCNC: 135 MMOL/L (ref 136–145)
VARIANT LYMPHS NFR BLD MANUAL: 1 % (ref 0–5)
VARIANT LYMPHS NFR BLD MANUAL: 3 % (ref 19.6–45.3)
WBC MORPH BLD: NORMAL
WBC NRBC COR # BLD: 2.1 10*3/MM3 (ref 3.4–10.8)

## 2023-06-09 PROCEDURE — 97530 THERAPEUTIC ACTIVITIES: CPT

## 2023-06-09 PROCEDURE — 97165 OT EVAL LOW COMPLEX 30 MIN: CPT

## 2023-06-09 PROCEDURE — 25010000002 ONDANSETRON PER 1 MG: Performed by: NURSE PRACTITIONER

## 2023-06-09 PROCEDURE — 86140 C-REACTIVE PROTEIN: CPT | Performed by: FAMILY MEDICINE

## 2023-06-09 PROCEDURE — 85652 RBC SED RATE AUTOMATED: CPT | Performed by: FAMILY MEDICINE

## 2023-06-09 PROCEDURE — 25010000002 PIPERACILLIN SOD-TAZOBACTAM PER 1 G: Performed by: NURSE PRACTITIONER

## 2023-06-09 PROCEDURE — 99231 SBSQ HOSP IP/OBS SF/LOW 25: CPT | Performed by: INTERNAL MEDICINE

## 2023-06-09 PROCEDURE — 63710000001 INSULIN LISPRO (HUMAN) PER 5 UNITS: Performed by: NURSE PRACTITIONER

## 2023-06-09 PROCEDURE — 83735 ASSAY OF MAGNESIUM: CPT | Performed by: FAMILY MEDICINE

## 2023-06-09 PROCEDURE — 82550 ASSAY OF CK (CPK): CPT | Performed by: FAMILY MEDICINE

## 2023-06-09 PROCEDURE — 82948 REAGENT STRIP/BLOOD GLUCOSE: CPT

## 2023-06-09 PROCEDURE — 97116 GAIT TRAINING THERAPY: CPT

## 2023-06-09 PROCEDURE — 85007 BL SMEAR W/DIFF WBC COUNT: CPT | Performed by: NURSE PRACTITIONER

## 2023-06-09 PROCEDURE — 80053 COMPREHEN METABOLIC PANEL: CPT | Performed by: FAMILY MEDICINE

## 2023-06-09 PROCEDURE — 85025 COMPLETE CBC W/AUTO DIFF WBC: CPT | Performed by: NURSE PRACTITIONER

## 2023-06-09 RX ORDER — FAMOTIDINE 20 MG/1
20 TABLET, FILM COATED ORAL
Status: DISCONTINUED | OUTPATIENT
Start: 2023-06-09 | End: 2023-06-12 | Stop reason: HOSPADM

## 2023-06-09 RX ORDER — POTASSIUM CHLORIDE 20 MEQ/1
40 TABLET, EXTENDED RELEASE ORAL EVERY 4 HOURS
Status: COMPLETED | OUTPATIENT
Start: 2023-06-09 | End: 2023-06-09

## 2023-06-09 RX ORDER — LORAZEPAM 1 MG/1
1 TABLET ORAL 2 TIMES DAILY
Status: DISCONTINUED | OUTPATIENT
Start: 2023-06-09 | End: 2023-06-10

## 2023-06-09 RX ADMIN — SODIUM CHLORIDE 100 ML/HR: 9 INJECTION, SOLUTION INTRAVENOUS at 20:31

## 2023-06-09 RX ADMIN — ONDANSETRON 4 MG: 2 INJECTION INTRAMUSCULAR; INTRAVENOUS at 00:56

## 2023-06-09 RX ADMIN — ONDANSETRON 4 MG: 2 INJECTION INTRAMUSCULAR; INTRAVENOUS at 09:06

## 2023-06-09 RX ADMIN — DOXYCYCLINE 100 MG: 100 INJECTION, POWDER, LYOPHILIZED, FOR SOLUTION INTRAVENOUS at 09:05

## 2023-06-09 RX ADMIN — PIPERACILLIN SODIUM AND TAZOBACTAM SODIUM 3.38 G: 3; .375 INJECTION, POWDER, LYOPHILIZED, FOR SOLUTION INTRAVENOUS at 20:32

## 2023-06-09 RX ADMIN — INSULIN LISPRO 2 UNITS: 100 INJECTION, SOLUTION INTRAVENOUS; SUBCUTANEOUS at 16:50

## 2023-06-09 RX ADMIN — PIPERACILLIN SODIUM AND TAZOBACTAM SODIUM 3.38 G: 3; .375 INJECTION, POWDER, LYOPHILIZED, FOR SOLUTION INTRAVENOUS at 12:13

## 2023-06-09 RX ADMIN — LORAZEPAM 1 MG: 1 TABLET ORAL at 13:37

## 2023-06-09 RX ADMIN — FAMOTIDINE 20 MG: 20 TABLET, FILM COATED ORAL at 09:06

## 2023-06-09 RX ADMIN — Medication 10 ML: at 20:32

## 2023-06-09 RX ADMIN — ACETAMINOPHEN 650 MG: 325 TABLET, FILM COATED ORAL at 00:56

## 2023-06-09 RX ADMIN — FAMOTIDINE 20 MG: 20 TABLET, FILM COATED ORAL at 16:50

## 2023-06-09 RX ADMIN — PIPERACILLIN SODIUM AND TAZOBACTAM SODIUM 3.38 G: 3; .375 INJECTION, POWDER, LYOPHILIZED, FOR SOLUTION INTRAVENOUS at 05:50

## 2023-06-09 RX ADMIN — POTASSIUM CHLORIDE 40 MEQ: 1500 TABLET, EXTENDED RELEASE ORAL at 11:27

## 2023-06-09 RX ADMIN — LORAZEPAM 1 MG: 1 TABLET ORAL at 20:45

## 2023-06-09 RX ADMIN — DOXYCYCLINE 100 MG: 100 INJECTION, POWDER, LYOPHILIZED, FOR SOLUTION INTRAVENOUS at 20:31

## 2023-06-09 RX ADMIN — POTASSIUM CHLORIDE 40 MEQ: 1500 TABLET, EXTENDED RELEASE ORAL at 06:28

## 2023-06-09 RX ADMIN — Medication 10 ML: at 09:06

## 2023-06-09 NOTE — PLAN OF CARE
Problem: Adult Inpatient Plan of Care  Goal: Absence of Hospital-Acquired Illness or Injury  Intervention: Prevent Infection  Description: Maintain skin and mucous membrane integrity; promote hand, oral and pulmonary hygiene.  Optimize fluid balance, nutrition, sleep and glycemic control to maximize infection resistance.  Identify potential sources of infection early to prevent or mitigate progression of infection (e.g., wound, lines, devices).  Evaluate ongoing need for invasive devices; remove promptly when no longer indicated.  Recent Flowsheet Documentation  Taken 6/8/2023 2035 by Octavio Manzo LPN  Infection Prevention:   visitors restricted/screened   single patient room provided   rest/sleep promoted   personal protective equipment utilized   equipment surfaces disinfected   hand hygiene promoted     Problem: Adjustment to Illness (Sepsis/Septic Shock)  Goal: Optimal Coping  Intervention: Optimize Psychosocial Adjustment to Illness  Description: Acknowledge, normalize, validate intensity and complexity of patient and support system response to situation.  Provide opportunity for expression of thoughts, feelings and concerns; respond with compassion and reassurance.  Decrease stress and anxiety by providing information about patient’s status and treatment.  Facilitate support system presence and participation in care; consider providing a diary in intensive care situation.  Support coping by recognizing current coping strategies; provide aid in developing new strategies.  Acknowledge and normalize difficulty in managing life-long lifestyle changes and expectations.  Assess and monitor for signs and symptoms of psychologic distress, anxiety and depression.  Consider palliative care consult for goals of care conversation, if the condition is worsening despite treatment.  Recent Flowsheet Documentation  Taken 6/8/2023 2035 by Octavio Manzo LPN  Supportive Measures:   active listening utilized    verbalization of feelings encouraged     Problem: Infection Progression (Sepsis/Septic Shock)  Goal: Absence of Infection Signs and Symptoms  Intervention: Initiate Sepsis Management  Description: Provide fluid therapy, such as crystalloid or albumin, to increase intravascular volume, organ perfusion and oxygen delivery.  Provide respiratory support, such as oxygen therapy, noninvasive or invasive positive pressure ventilation, to achieve oxygenation and ventilation goal; avoid hyperoxemia.  Obtain cultures prior to initiating antimicrobial therapy when possible. Do not delay for laboratory results in the presence of high suspicion or clinical indicators.  Administer intravenous broad-spectrum antimicrobial therapy promptly.  Implement hemodynamic monitoring to guide intravascular support based on individual targeted parameters.  Determine and address underlying source of infection aggressively; implement transmission-based precautions and isolation, as indicated.  Recent Flowsheet Documentation  Taken 6/8/2023 2035 by Octavio Manzo LPN  Infection Prevention:   visitors restricted/screened   single patient room provided   rest/sleep promoted   personal protective equipment utilized   equipment surfaces disinfected   hand hygiene promoted     Problem: Skin Injury Risk Increased  Goal: Skin Health and Integrity  Intervention: Optimize Skin Protection  Description: Perform a full pressure injury risk assessment, as indicated by screening, upon admission to care unit.  Reassess skin (injury risk, full inspection) frequently (e.g., scheduled interval, with change in condition) to provide optimal early detection and prevention.  Maintain adequate tissue perfusion (e.g., encourage fluid balance; avoid crossing legs, constrictive clothing or devices) to promote tissue oxygenation.  Maintain head of bed at lowest degree of elevation tolerated, considering medical condition and other restrictions.  Avoid positioning  onto an area that remains reddened.  Minimize incontinence and moisture (e.g., toileting schedule; moisture-wicking pad, diaper or incontinence collection device; skin moisture barrier).  Cleanse skin promptly and gently when soiled utilizing a pH-balanced cleanser.  Relieve and redistribute pressure (e.g., scheduled position changes, weight shifts, use of support surface, medical device repositioning, protective dressing application, use of positioning device, microclimate control, use of pressure-injury-monitor  Encourage increased activity, such as sitting in a chair at the bedside or early mobilization, when able to tolerate.  Recent Flowsheet Documentation  Taken 6/8/2023 2035 by Octavio Manzo LPN  Pressure Reduction Techniques: frequent weight shift encouraged  Pressure Reduction Devices: pressure-redistributing mattress utilized   Goal Outcome Evaluation:  Plan of care review , noted pt having period of upper gastric discomfort, per pt been on going , tylenol and Zofran administer helping with discomfort awaiting tick panel results , up with assist x1-2 to [facility]    Will monitor

## 2023-06-09 NOTE — PLAN OF CARE
Problem: Adult Inpatient Plan of Care  Goal: Absence of Hospital-Acquired Illness or Injury  Intervention: Prevent Infection  Description: Maintain skin and mucous membrane integrity; promote hand, oral and pulmonary hygiene.  Optimize fluid balance, nutrition, sleep and glycemic control to maximize infection resistance.  Identify potential sources of infection early to prevent or mitigate progression of infection (e.g., wound, lines, devices).  Evaluate ongoing need for invasive devices; remove promptly when no longer indicated.  Recent Flowsheet Documentation  Taken 6/8/2023 2035 by Octavio Manzo LPN  Infection Prevention:   visitors restricted/screened   single patient room provided   rest/sleep promoted   personal protective equipment utilized   equipment surfaces disinfected   hand hygiene promoted     Problem: Adjustment to Illness (Sepsis/Septic Shock)  Goal: Optimal Coping  Intervention: Optimize Psychosocial Adjustment to Illness  Description: Acknowledge, normalize, validate intensity and complexity of patient and support system response to situation.  Provide opportunity for expression of thoughts, feelings and concerns; respond with compassion and reassurance.  Decrease stress and anxiety by providing information about patient’s status and treatment.  Facilitate support system presence and participation in care; consider providing a diary in intensive care situation.  Support coping by recognizing current coping strategies; provide aid in developing new strategies.  Acknowledge and normalize difficulty in managing life-long lifestyle changes and expectations.  Assess and monitor for signs and symptoms of psychologic distress, anxiety and depression.  Consider palliative care consult for goals of care conversation, if the condition is worsening despite treatment.  Recent Flowsheet Documentation  Taken 6/8/2023 2035 by Octavio Manzo LPN  Supportive Measures:   active listening utilized    verbalization of feelings encouraged     Problem: Infection Progression (Sepsis/Septic Shock)  Goal: Absence of Infection Signs and Symptoms  Intervention: Initiate Sepsis Management  Description: Provide fluid therapy, such as crystalloid or albumin, to increase intravascular volume, organ perfusion and oxygen delivery.  Provide respiratory support, such as oxygen therapy, noninvasive or invasive positive pressure ventilation, to achieve oxygenation and ventilation goal; avoid hyperoxemia.  Obtain cultures prior to initiating antimicrobial therapy when possible. Do not delay for laboratory results in the presence of high suspicion or clinical indicators.  Administer intravenous broad-spectrum antimicrobial therapy promptly.  Implement hemodynamic monitoring to guide intravascular support based on individual targeted parameters.  Determine and address underlying source of infection aggressively; implement transmission-based precautions and isolation, as indicated.  Recent Flowsheet Documentation  Taken 6/8/2023 2035 by Octavio Manzo LPN  Infection Prevention:   visitors restricted/screened   single patient room provided   rest/sleep promoted   personal protective equipment utilized   equipment surfaces disinfected   hand hygiene promoted     Problem: Skin Injury Risk Increased  Goal: Skin Health and Integrity  Intervention: Optimize Skin Protection  Description: Perform a full pressure injury risk assessment, as indicated by screening, upon admission to care unit.  Reassess skin (injury risk, full inspection) frequently (e.g., scheduled interval, with change in condition) to provide optimal early detection and prevention.  Maintain adequate tissue perfusion (e.g., encourage fluid balance; avoid crossing legs, constrictive clothing or devices) to promote tissue oxygenation.  Maintain head of bed at lowest degree of elevation tolerated, considering medical condition and other restrictions.  Avoid positioning  onto an area that remains reddened.  Minimize incontinence and moisture (e.g., toileting schedule; moisture-wicking pad, diaper or incontinence collection device; skin moisture barrier).  Cleanse skin promptly and gently when soiled utilizing a pH-balanced cleanser.  Relieve and redistribute pressure (e.g., scheduled position changes, weight shifts, use of support surface, medical device repositioning, protective dressing application, use of positioning device, microclimate control, use of pressure-injury-monitor  Encourage increased activity, such as sitting in a chair at the bedside or early mobilization, when able to tolerate.  Recent Flowsheet Documentation  Taken 6/8/2023 2035 by Octavio Manzo LPN  Pressure Reduction Techniques: frequent weight shift encouraged  Pressure Reduction Devices: pressure-redistributing mattress utilized   Goal Outcome Evaluation:

## 2023-06-09 NOTE — PLAN OF CARE
Problem: Adult Inpatient Plan of Care  Goal: Absence of Hospital-Acquired Illness or Injury  Intervention: Identify and Manage Fall Risk  Recent Flowsheet Documentation  Taken 6/9/2023 1624 by Sarai Luna, KATERINA  Safety Promotion/Fall Prevention:   assistive device/personal items within reach   clutter free environment maintained   fall prevention program maintained   lighting adjusted   nonskid shoes/slippers when out of bed   room organization consistent   safety round/check completed  Taken 6/9/2023 1449 by Sarai Luna, KATERINA  Safety Promotion/Fall Prevention:   assistive device/personal items within reach   clutter free environment maintained   fall prevention program maintained   lighting adjusted   nonskid shoes/slippers when out of bed   room organization consistent   safety round/check completed  Taken 6/9/2023 1414 by Sarai Luna, KATERINA  Safety Promotion/Fall Prevention:   assistive device/personal items within reach   clutter free environment maintained   fall prevention program maintained   lighting adjusted   nonskid shoes/slippers when out of bed   room organization consistent   safety round/check completed  Taken 6/9/2023 1239 by Sarai Luna, KATERINA  Safety Promotion/Fall Prevention:   assistive device/personal items within reach   clutter free environment maintained   fall prevention program maintained   lighting adjusted   nonskid shoes/slippers when out of bed   room organization consistent   safety round/check completed  Taken 6/9/2023 1230 by Sarai Luna, KATERINA  Safety Promotion/Fall Prevention:   assistive device/personal items within reach   clutter free environment maintained   fall prevention program maintained   lighting adjusted   nonskid shoes/slippers when out of bed   room organization consistent   safety round/check completed  Taken 6/9/2023 1044 by Sarai Luna, KATERINA  Safety Promotion/Fall Prevention:   assistive device/personal items within reach   clutter free environment maintained   fall  prevention program maintained   lighting adjusted   nonskid shoes/slippers when out of bed   room organization consistent   safety round/check completed  Taken 6/9/2023 0714 by Sarai Luna RN  Safety Promotion/Fall Prevention:   assistive device/personal items within reach   clutter free environment maintained   fall prevention program maintained   lighting adjusted   nonskid shoes/slippers when out of bed   room organization consistent   safety round/check completed  Intervention: Prevent Skin Injury  Recent Flowsheet Documentation  Taken 6/9/2023 1624 by Sarai Luna RN  Body Position: position changed independently  Taken 6/9/2023 1449 by Sarai Luna RN  Body Position: position changed independently  Taken 6/9/2023 1414 by Sarai Luna RN  Body Position: position changed independently  Taken 6/9/2023 1239 by Sarai Luna RN  Body Position: position changed independently  Taken 6/9/2023 1230 by Sarai Luna RN  Body Position: position changed independently  Taken 6/9/2023 1044 by Sarai Luna RN  Body Position: position changed independently  Taken 6/9/2023 0714 by Sarai Luna RN  Body Position: position changed independently  Intervention: Prevent and Manage VTE (Venous Thromboembolism) Risk  Recent Flowsheet Documentation  Taken 6/9/2023 1624 by Sarai Luna RN  Activity Management: activity encouraged  Taken 6/9/2023 1449 by Sarai Luna RN  Activity Management: activity encouraged  Taken 6/9/2023 1414 by Sarai Luna RN  Activity Management: activity encouraged  Taken 6/9/2023 1239 by Sarai Luna RN  Activity Management: activity encouraged  Taken 6/9/2023 1230 by Sarai Luna RN  Activity Management: activity encouraged  Taken 6/9/2023 1044 by aSrai Luna, RN  Activity Management: activity encouraged  Taken 6/9/2023 0714 by Sarai Luna, RN  Activity Management: activity encouraged  Intervention: Prevent Infection  Recent Flowsheet Documentation  Taken 6/9/2023 1624 by Sarai Luna  RN  Infection Prevention:   hand hygiene promoted   personal protective equipment utilized  Taken 6/9/2023 1449 by Sarai Luna RN  Infection Prevention:   personal protective equipment utilized   hand hygiene promoted  Taken 6/9/2023 1414 by Sarai Luna RN  Infection Prevention:   hand hygiene promoted   personal protective equipment utilized  Taken 6/9/2023 1239 by Sarai Luna RN  Infection Prevention:   hand hygiene promoted   personal protective equipment utilized  Taken 6/9/2023 1230 by Sarai Luna RN  Infection Prevention:   hand hygiene promoted   personal protective equipment utilized  Taken 6/9/2023 1044 by Sarai Luna RN  Infection Prevention:   hand hygiene promoted   personal protective equipment utilized  Taken 6/9/2023 0714 by Sarai Luna RN  Infection Prevention:   hand hygiene promoted   personal protective equipment utilized  Goal: Optimal Comfort and Wellbeing  Intervention: Provide Person-Centered Care  Recent Flowsheet Documentation  Taken 6/9/2023 0714 by Sarai Luna RN  Trust Relationship/Rapport: care explained     Problem: Fall Injury Risk  Goal: Absence of Fall and Fall-Related Injury  Intervention: Identify and Manage Contributors  Recent Flowsheet Documentation  Taken 6/9/2023 1624 by Sarai Luna RN  Medication Review/Management: medications reviewed  Taken 6/9/2023 1449 by Sarai Luna RN  Medication Review/Management: medications reviewed  Taken 6/9/2023 1414 by Sarai Luna RN  Medication Review/Management: medications reviewed  Taken 6/9/2023 1239 by Sarai Luna RN  Medication Review/Management: medications reviewed  Taken 6/9/2023 1230 by Sarai Luna RN  Medication Review/Management: medications reviewed  Taken 6/9/2023 1044 by Sarai Luna RN  Medication Review/Management: medications reviewed  Taken 6/9/2023 0714 by Sarai Luna RN  Medication Review/Management: medications reviewed  Intervention: Promote Injury-Free Environment  Recent Flowsheet  Documentation  Taken 6/9/2023 1624 by Sarai Luna RN  Safety Promotion/Fall Prevention:   assistive device/personal items within reach   clutter free environment maintained   fall prevention program maintained   lighting adjusted   nonskid shoes/slippers when out of bed   room organization consistent   safety round/check completed  Taken 6/9/2023 1449 by Sarai Luna RN  Safety Promotion/Fall Prevention:   assistive device/personal items within reach   clutter free environment maintained   fall prevention program maintained   lighting adjusted   nonskid shoes/slippers when out of bed   room organization consistent   safety round/check completed  Taken 6/9/2023 1414 by Sarai Luna RN  Safety Promotion/Fall Prevention:   assistive device/personal items within reach   clutter free environment maintained   fall prevention program maintained   lighting adjusted   nonskid shoes/slippers when out of bed   room organization consistent   safety round/check completed  Taken 6/9/2023 1239 by Sarai Luna RN  Safety Promotion/Fall Prevention:   assistive device/personal items within reach   clutter free environment maintained   fall prevention program maintained   lighting adjusted   nonskid shoes/slippers when out of bed   room organization consistent   safety round/check completed  Taken 6/9/2023 1230 by Sarai Luna RN  Safety Promotion/Fall Prevention:   assistive device/personal items within reach   clutter free environment maintained   fall prevention program maintained   lighting adjusted   nonskid shoes/slippers when out of bed   room organization consistent   safety round/check completed  Taken 6/9/2023 1044 by Sarai Luna RN  Safety Promotion/Fall Prevention:   assistive device/personal items within reach   clutter free environment maintained   fall prevention program maintained   lighting adjusted   nonskid shoes/slippers when out of bed   room organization consistent   safety round/check completed  Taken  6/9/2023 0714 by Sarai Luna RN  Safety Promotion/Fall Prevention:   assistive device/personal items within reach   clutter free environment maintained   fall prevention program maintained   lighting adjusted   nonskid shoes/slippers when out of bed   room organization consistent   safety round/check completed     Problem: Adjustment to Illness (Sepsis/Septic Shock)  Goal: Optimal Coping  Intervention: Optimize Psychosocial Adjustment to Illness  Recent Flowsheet Documentation  Taken 6/9/2023 0714 by Sarai Luna RN  Family/Support System Care:   support provided   self-care encouraged     Problem: Infection Progression (Sepsis/Septic Shock)  Goal: Absence of Infection Signs and Symptoms  Intervention: Initiate Sepsis Management  Recent Flowsheet Documentation  Taken 6/9/2023 1624 by Sarai Luna RN  Infection Prevention:   hand hygiene promoted   personal protective equipment utilized  Taken 6/9/2023 1449 by Sarai Luna RN  Infection Prevention:   personal protective equipment utilized   hand hygiene promoted  Taken 6/9/2023 1414 by Sarai Luna RN  Infection Prevention:   hand hygiene promoted   personal protective equipment utilized  Taken 6/9/2023 1239 by Sarai Luna RN  Infection Prevention:   hand hygiene promoted   personal protective equipment utilized  Taken 6/9/2023 1230 by Sarai Luna RN  Infection Prevention:   hand hygiene promoted   personal protective equipment utilized  Taken 6/9/2023 1044 by Sarai Luna RN  Infection Prevention:   hand hygiene promoted   personal protective equipment utilized  Taken 6/9/2023 0714 by Sarai Luna RN  Infection Prevention:   hand hygiene promoted   personal protective equipment utilized  Intervention: Promote Recovery  Recent Flowsheet Documentation  Taken 6/9/2023 1624 by Sarai Luna RN  Activity Management: activity encouraged  Taken 6/9/2023 1449 by Sarai Luna RN  Activity Management: activity encouraged  Taken 6/9/2023 1414 by Sarai Luna  RN  Activity Management: activity encouraged  Taken 6/9/2023 1239 by Sarai Luna RN  Activity Management: activity encouraged  Taken 6/9/2023 1230 by Sarai Luna RN  Activity Management: activity encouraged  Taken 6/9/2023 1044 by Sarai Luna RN  Activity Management: activity encouraged  Taken 6/9/2023 0714 by Sarai Luna RN  Activity Management: activity encouraged     Problem: Skin Injury Risk Increased  Goal: Skin Health and Integrity  Intervention: Optimize Skin Protection  Recent Flowsheet Documentation  Taken 6/9/2023 1624 by Sarai Luna RN  Head of Bed (HOB) Positioning: HOB elevated  Taken 6/9/2023 1449 by Sarai Luna RN  Head of Bed (HOB) Positioning: HOB elevated  Taken 6/9/2023 1414 by Sarai Luna RN  Head of Bed (HOB) Positioning: HOB elevated  Taken 6/9/2023 1239 by Sarai Luna RN  Head of Bed (HOB) Positioning: HOB elevated  Taken 6/9/2023 1230 by Sarai Luna RN  Head of Bed (HOB) Positioning: HOB elevated  Taken 6/9/2023 1044 by Sarai Luna RN  Head of Bed (HOB) Positioning: HOB elevated  Taken 6/9/2023 0714 by Sarai Luna RN  Pressure Reduction Techniques: frequent weight shift encouraged  Head of Bed (HOB) Positioning: HOB elevated  Pressure Reduction Devices: pressure-redistributing mattress utilized     Problem: Hypertension Comorbidity  Goal: Blood Pressure in Desired Range  Intervention: Maintain Blood Pressure Management  Recent Flowsheet Documentation  Taken 6/9/2023 1624 by Sarai Luna RN  Medication Review/Management: medications reviewed  Taken 6/9/2023 1449 by Sarai Luna RN  Medication Review/Management: medications reviewed  Taken 6/9/2023 1414 by Sarai Luna RN  Medication Review/Management: medications reviewed  Taken 6/9/2023 1239 by Sarai Luna RN  Medication Review/Management: medications reviewed  Taken 6/9/2023 1230 by Sarai Luna RN  Medication Review/Management: medications reviewed  Taken 6/9/2023 1044 by Sarai Luna, KATERINA  Medication  Review/Management: medications reviewed  Taken 6/9/2023 0714 by Sarai Luna, RN  Medication Review/Management: medications reviewed   Goal Outcome Evaluation:         Patient A&O. Ativan given for anxiety this shift. Appears effective. NO complaints noted at this time.

## 2023-06-09 NOTE — PLAN OF CARE
Goal Outcome Evaluation:  Plan of Care Reviewed With: patient        Progress: no change  Outcome Evaluation: Pt presents as an 81 y/o M admitted to [facility] on 6/7/23 with complaints of fever, chills, weakness, falls at home, nausea vomiting, diarrhea, and episodes of shortness of breath and choking. Pt has triggered sepsis and tick borne diseases are being ruled out. Hematology is following pt. CT head negative. CXR negative. CT abdomen shows inflammation. Pt has elevated liver transaminase and thrombocytopenia. Pt. states she lives at home w/ spouse who is recovering from open heart surgery, maintains I/ADL independence at baseline. Pt. requires CGA for short ambulatory transfer to and from [facility] this date w/ rolling walker support, limited standing tolerance secondary to fatigue. Max A provided for toileting/posterior hygiene. Pt. far from baseline level of functional independence and will benefit from SNF placement at d/c to address aforementioned deficits.

## 2023-06-09 NOTE — THERAPY EVALUATION
Patient Name: Mark Marie  : 1940    MRN: [id number]                              Today's Date: 2023       Admit Date: 2023    Visit Dx:     ICD-10-CM ICD-9-CM   1. Sepsis, due to unspecified organism, unspecified whether acute organ dysfunction present  A41.9 038.9     995.91   2. Bandemia  D72.825 288.66   3. Dehydration  E86.0 276.51   4. Acute kidney injury  N17.9 584.9   5. 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   • Hyperlipidemia   • Hypertension   • Hypothyroidism   • Wellness examination   • Chronic bilateral low back pain without sciatica   • Memory change   • Medicare annual wellness visit, subsequent   • Clinical diagnosis of COVID-19   • Lumbar radiculopathy   • Basal cell carcinoma, face   • Crushing injury of right middle finger   • Fatigue   • Need for hepatitis C screening test   • Pneumonia due to infectious organism   • Dyspnea   • Confusion   • Sepsis, due to unspecified organism, unspecified whether acute organ dysfunction present   • Febrile illness   • Thrombocytopenia   • Elevated liver transaminase level   • Dysphagia   • Acute kidney injury   • History of prostate cancer   • Non-traumatic rhabdomyolysis     Past Medical History:   Diagnosis Date   • Cataract removed 16 years ago   • Diabetes mellitus    • HL (hearing loss) have worn hearing aids for 16 years   • Hyperlipidemia    • Hypertension    • Hypothyroidism    • Joint pain    • Low back pain    • Non-traumatic rhabdomyolysis 2023   • Prostate cancer    • Squamous cell carcinoma of skin     right temple     Past Surgical History:   Procedure Laterality Date   • HEMORROIDECTOMY     • HERNIA REPAIR      inguinal and umbilical   • KNEE SURGERY Bilateral     trimmed cartilage   • VASECTOMY  50 years ago      General Information     Row Name 23 1651          OT Time and Intention    Document Type  evaluation  -     Mode of Treatment occupational therapy  -     Row Name 06/09/23 1651          General Information    Patient Profile Reviewed yes  -MP     Prior Level of Function independent:;ADL's  -     Existing Precautions/Restrictions fall;swallow precautions  -[facility] Name 06/09/23 1651          Living Environment    People in Home spouse  -[facility] Name 06/09/23 1651          Cognition    Orientation Status (Cognition) oriented x 4  -     Row Name 06/09/23 1651          Safety Issues, Functional Mobility    Impairments Affecting Function (Mobility) balance;endurance/activity tolerance;strength;range of motion (ROM);postural/trunk control;pain;shortness of breath  -           User Key  (r) = Recorded By, (t) = Taken By, (c) = Cosigned By    Initials Name Provider Type    Trev Villalobos OT Occupational Therapist                 Mobility/ADL's     Row Name 06/09/23 1652          Bed Mobility    Bed Mobility supine-sit-supine  -     Supine-Sit-Supine [place] (Bed Mobility) contact guard  -     Assistive Device (Bed Mobility) head of bed elevated;bed rails  -[facility] Name 06/09/23 1652          Transfers    Transfers toilet transfer  -[facility] Name 06/09/23 1652          Sit-Stand Transfer    Sit-Stand [place] (Transfers) contact guard  -     Assistive Device (Sit-Stand Transfers) walker, front-wheeled  -[facility] Name 06/09/23 1652          Toilet Transfer    Type (Toilet Transfer) sit-stand;stand-sit  -     [place] Level (Toilet Transfer) contact guard  -[facility] Name 06/09/23 1652          Functional Mobility    Functional Mobility- Ind. Level contact guard assist  -[facility] Name 06/09/23 1652          Activities of Daily Living    BADL Assessment/Intervention feeding;toileting  -[facility] Name 06/09/23 1652          Toileting Assessment/Training    [place] Level (Toileting) toileting skills;maximum assist (25% patient effort)  -           User Key   (r) = Recorded By, (t) = Taken By, (c) = Cosigned By    Initials Name Provider Type    Trev Villalobos OT Occupational Therapist               Obj/Interventions     Row Name 06/09/23 1652          Range of Motion Comprehensive    Comment, General Range of Motion BUE WFL  -MP     Row Name 06/09/23 1652          Strength Comprehensive (MMT)    Comment, General Manual Muscle Testing (MMT) Assessment BUE 4-/5  -MP     Row Name 06/09/23 1652          Balance    Balance Interventions sitting;standing;sit to stand;supported;static;dynamic  -MP           User Key  (r) = Recorded By, (t) = Taken By, (c) = Cosigned By    Initials Name Provider Type    Trev Villalobos OT Occupational Therapist               Goals/Plan     Row Name 06/09/23 1655          Bed Mobility Goal 1 (OT)    Activity/Assistive Device (Bed Mobility Goal 1, OT) bed mobility activities, all  -MP     [place] Level/Cues Needed (Bed Mobility Goal 1, OT) contact guard required  -MP     Time Frame (Bed Mobility Goal 1, OT) long term goal (LTG);2 weeks  -MP     Row Name 06/09/23 1655          Transfer Goal 1 (OT)    Activity/Assistive Device (Transfer Goal 1, OT) sit-to-stand/stand-to-sit;toilet  -MP     [place] Level/Cues Needed (Transfer Goal 1, OT) supervision required  -MP     Time Frame (Transfer Goal 1, OT) long term goal (LTG);2 weeks  -MP     Row Name 06/09/23 1655          Dressing Goal 1 (OT)    Activity/Device (Dressing Goal 1, OT) lower body dressing  -MP     [place]/Cues Needed (Dressing Goal 1, OT) supervision required  -MP     Time Frame (Dressing Goal 1, OT) long term goal (LTG);2 weeks  -MP           User Key  (r) = Recorded By, (t) = Taken By, (c) = Cosigned By    Initials Name Provider Type    Trev Villalobos OT Occupational Therapist               Clinical Impression     Row Name 06/09/23 1652          Pain Assessment    Pretreatment Pain Rating 0/10 - no pain  -MP     Posttreatment Pain Rating 0/10 - no  pain  -MP     Row Name 06/09/23 1652          Plan of Care Review    Plan of Care Reviewed With patient  -MP     Progress no change  -MP     Outcome Evaluation Pt presents as an 81 y/o M admitted to [facility] on [facility]23 with complaints of fever, chills, weakness, falls at home, nausea vomiting, diarrhea, and episodes of shortness of breath and choking. Pt has triggered sepsis and tick borne diseases are being ruled out. Hematology is following pt. CT head negative. CXR negative. CT abdomen shows inflammation. Pt has elevated liver transaminase and thrombocytopenia. Pt. states she lives at home w/ spouse who is recovering from open heart surgery, maintains I/ADL independence at baseline. Pt. requires CGA for short ambulatory [place] [facility] [place] w/ rolling walker support, limited standing tolerance secondary to fatigue. Max A provided for toileting/posterior hygiene. Pt. far from baseline level of functional independence and will benefit from SNF placement at d/c to address aforementioned deficits.  -MP     Row Name 06/09/23 1652          Therapy Assessment/Plan (OT)    Rehab Potential (OT) good, to achieve stated therapy goals  -MP     Criteria for Skilled Therapeutic Interventions Met (OT) yes;meets criteria;skilled treatment is necessary  -MP     Therapy Frequency (OT) 3 times/wk  -MP     Row Name 06/09/23 1652          Therapy Plan Review/Discharge Plan (OT)    Anticipated Discharge Disposition (OT) skilled nursing facility  -MP     Row Name 06/09/23 1652          Vital Signs    Pre Patient Position Sitting  -MP     Intra Patient Position Standing  -MP     Post Patient Position Sitting  -MP     Row Name 06/09/23 1652          Positioning and Restraints    Pre-Treatment Position bedside commode  -MP     Post Treatment Position chair  -MP     In Chair sitting;encouraged to call for assist;call light within reach;exit alarm on  -MP           User Key  (r) = Recorded By, (t) = Taken By, (c) = Cosigned By     Initials Name Provider Type    Trev Villalobos OT Occupational Therapist               Outcome Measures     Row Name 06/09/23 0714          How much help from another person do you currently need...    Turning from your back to your side while in flat bed without using bedrails? 4  -TA     Moving from lying on back to sitting on the side of a flat bed without bedrails? 4  -TA     Moving to and from a bed to a chair (including a wheelchair)? 3  -TA     Standing up from a chair using your arms (e.g., wheelchair, bedside chair)? 3  -TA     Climbing 3-5 steps with a railing? 2  -TA     To walk in hospital room? 2  -TA     AM-PAC 6 Clicks Score (PT) 18  -TA     Highest level of mobility 6 --> Walked 10 steps or more  -TA           User Key  (r) = Recorded By, (t) = Taken By, (c) = Cosigned By    Initials Name Provider Type    Sarai Rush, RN Registered Nurse                Occupational Therapy Education     Title: PT OT SLP Therapies (In Progress)     Topic: Occupational Therapy (In Progress)     Point: ADL training (Not Started)     Description:   Instruct learner(s) on proper safety adaptation and remediation techniques during self care or transfers.   Instruct in proper use of assistive devices.              Learner Progress:  Not documented in this visit.          Point: Home exercise program (Not Started)     Description:   Instruct learner(s) on appropriate technique for monitoring, assisting and/or progressing therapeutic exercises/activities.              Learner Progress:  Not documented in this visit.          Point: Precautions (Not Started)     Description:   Instruct learner(s) on prescribed precautions during self-care and functional transfers.              Learner Progress:  Not documented in this visit.          Point: Body mechanics (Done)     Description:   Instruct learner(s) on proper positioning and spine alignment during self-care, functional mobility activities and/or exercises.               Learning Progress Summary           Patient Acceptance, E,TB, VU by  at 6/9/2023 1655                               User Key     Initials Effective Dates Name Provider Type Discipline     06/16/21 -  Trev Gold OT Occupational Therapist OT              OT Recommendation and Plan  Therapy Frequency (OT): 3 times/wk  Plan of Care Review  Plan of Care Reviewed With: patient  Progress: no change  Outcome Evaluation: Pt presents as an 83 y/o M admitted to [facility] on 6/7/23 with complaints of fever, chills, weakness, falls at home, nausea vomiting, diarrhea, and episodes of shortness of breath and choking. Pt has triggered sepsis and tick borne diseases are being ruled out. Hematology is following pt. CT head negative. CXR negative. CT abdomen shows inflammation. Pt has elevated liver transaminase and thrombocytopenia. Pt. states she lives at home w/ spouse who is recovering from open heart surgery, maintains I/ADL independence at baseline. Pt. requires CGA for [place] [facility] [place] walker support, limited standing tolerance secondary to fatigue. Max A provided for toileting/posterior hygiene. Pt. far from baseline level of functional independence and will benefit from SNF placement at d/c to address aforementioned deficits.     Time Calculation:    Time Calculation- OT     Row Name 06/09/23 1656             Time Calculation- OT    OT Start Time 1204  -      OT Stop Time 1226  -      OT Time Calculation (min) 22 min  -      Total Timed Code Minutes- OT 0 minute(s)  -      OT Received On 06/09/23  -      OT - Next Appointment 06/12/23  -      OT Goal Re-Cert Due Date 06/23/23  -            User Key  (r) = Recorded By, (t) = Taken By, (c) = Cosigned By    Initials Name Provider Type    Trev Villalobos OT Occupational Therapist              Therapy Charges for Today     Code Description Service Date Service Provider Modifiers Qty    [id number]   OT EVAL LOW COMPLEXITY 4 6/9/2023 Trev Gold, TONI GO 1               Trev Gold, TONI  6/9/2023

## 2023-06-09 NOTE — PROGRESS NOTES
Hematology/Oncology Inpatient Progress Note    PATIENT NAME: Mark Marie  : 1940  MRN: [id number]    CHIEF COMPLAINT: Fever    HISTORY OF PRESENT ILLNESS:      Mark Marie is a 82 y.o. male who presented to [facility] on 2023 at the direction of his primary care physician with complaints of fever, chills, weakness and falls.  Past medical history of type 2 diabetes mellitus, hypertension, hyperlipidemia, hypothyroidism, dementia prostate cancer in , squamous cell carcinoma of skin.  Patient reported he had been feeling poorly for over a week.  In addition to fever and chills he also reported nausea and diarrhea.  Intermittent episodes of dysphagia, difficulty breathing, hiccups and congestion.  Denied any abdominal pain or urinary symptoms.     Evaluation in the ED: WBC 3.90, hemoglobin 15 g/dL, MCV 91.8, platelets 43,000, bands 22%, lymphocytes 5%, plasma cells 1%.  BUN 27, creatinine 1.29, , ALT 53, negative respiratory panel, chest x-ray showing no acute cardiopulmonary disease, blood cultures drawn and pending, CT of the head without contrast showing no acute intracranial abnormality; age-appropriate volume loss; mild to moderate amount of low-density periventricular and subcortical white matter most commonly seen with chronic small vessel ischemic change.  UA positive for large amount of blood, trace leukocytes CT of the abdomen and pelvis without contrast showed possible enteritis, diverticulosis without diverticulitis, small hiatal hernia, hepatomegaly.  PT 12.0, INR 1.13.     23  Hematology/Oncology was consulted for acute thrombocytopenia.  Patient had platelets presented with platelet count of 43,000 with bandemia.  He also has had a febrile illness no obvious source at this time..  Patient has a history of underlying mild dementia     He/She  has a past medical history of Cataract (removed 16 years ago), Diabetes mellitus, HL (hearing loss) (have worn hearing  aids for 16 years), Hyperlipidemia, Hypertension, Hypothyroidism, Joint pain, Low back pain, Prostate cancer (2011), and Squamous cell carcinoma of skin.     PCP: Diana Linares MD    Subjective      Patient has clinically improved    ROS:    Review of Systems   Constitutional: Positive for fatigue. Negative for chills and fever.   HENT: Negative for congestion, drooling, ear discharge, rhinorrhea, sinus pressure and tinnitus.    Eyes: Negative for photophobia, pain and discharge.   Respiratory: Negative for apnea, choking and stridor.    Cardiovascular: Negative for palpitations.   Gastrointestinal: Negative for abdominal distention, abdominal pain and anal bleeding.   Endocrine: Negative for polydipsia and polyphagia.   Genitourinary: Negative for decreased urine volume, flank pain and genital sores.   Musculoskeletal: Negative for gait problem, neck pain and neck stiffness.   Skin: Negative for color change, rash and wound.   Neurological: Positive for weakness. Negative for tremors, seizures, syncope, facial asymmetry and speech difficulty.   Hematological: Negative for adenopathy.   Psychiatric/Behavioral: Negative for agitation, confusion, hallucinations and self-injury. The patient is not hyperactive.         MEDICATIONS:      Scheduled Meds:  doxycycline, 100 mg, Intravenous, Q12H  famotidine, 20 mg, Oral, BID AC  insulin lispro, 2-7 Units, Subcutaneous, 4x Daily AC & at Bedtime  LORazepam, 1 mg, Oral, BID  piperacillin-tazobactam, 3.375 g, Intravenous, Once  piperacillin-tazobactam, 3.375 g, Intravenous, Q8H  sodium chloride, 10 mL, Intravenous, Q12H       Continuous Infusions:  sodium chloride, 100 mL/hr, Last Rate: 100 mL/hr (06/08/23 1200)       PRN Meds:  •  acetaminophen  •  Calcium Replacement - Follow Nurse / BPA Driven Protocol  •  dextrose  •  dextrose  •  glucagon (human recombinant)  •  Magnesium Standard Dose Replacement - Follow Nurse / BPA Driven Protocol  •  ondansetron  •  Phosphorus  "Replacement - Follow Nurse / BPA Driven Protocol  •  polyethylene glycol  •  Potassium Replacement - Follow Nurse / BPA Driven Protocol  •  sodium chloride  •  sodium chloride  •  sodium chloride     ALLERGIES:    Allergies   Allergen Reactions   • Ramipril Swelling     Leg swelling       Objective    VITALS:   /74 (BP Location: Left arm, Patient Position: Lying)   Pulse 78   Temp 98 °F (36.7 °C) (Oral)   Resp 18   Ht 162.6 cm (64\")   Wt 85.2 kg (187 lb 13.3 oz)   SpO2 96%   BMI 32.24 kg/m²     PHYSICAL EXAM:     Physical Exam  Vitals and nursing note reviewed.   Constitutional:       General: He is not in acute distress.     Appearance: He is not diaphoretic.   HENT:      Head: Normocephalic and atraumatic.   Eyes:      General: No scleral icterus.        Right eye: No discharge.         Left eye: No discharge.      Conjunctiva/sclera: Conjunctivae normal.   Neck:      Thyroid: No thyromegaly.   Cardiovascular:      Rate and Rhythm: Normal rate and regular rhythm.      Heart sounds: Normal heart sounds.     No friction rub. No gallop.   Pulmonary:      Effort: Pulmonary effort is normal. No respiratory distress.      Breath sounds: No stridor. No wheezing.   Abdominal:      General: Bowel sounds are normal.      Palpations: Abdomen is soft. There is no mass.      Tenderness: There is no abdominal tenderness. There is no guarding or rebound.   Musculoskeletal:         General: No tenderness. Normal range of motion.      Cervical back: Normal range of motion and neck supple.   Lymphadenopathy:      Cervical: No cervical adenopathy.   Skin:     General: Skin is warm.      Findings: No erythema or rash.   Neurological:      Mental Status: He is alert and oriented to person, place, and time.      Motor: No abnormal muscle tone.   Psychiatric:         Behavior: Behavior normal.           RECENT LABS:  Lab Results (last 24 hours)     Procedure Component Value Units Date/Time    POC Glucose Once [[id number]]  " (Abnormal) Collected: 06/09/23 1636    Specimen: Blood Updated: 06/09/23 1639     Glucose 179 mg/dL      Comment: Serial Number: [id number]:  [id number]       Immunofixation, Serum [[id number]]  (Abnormal) Collected: 06/08/23 1219    Specimen: Blood Updated: 06/09/23 1612     Immunofixation Result, Serum Comment     Comment: Immunofixation shows IgM monoclonal protein with lambda light chain  specificity.        IgG 814 mg/dL      IgA 417 mg/dL      IgM 181 mg/dL     Narrative:      Performed at:  [address]  [id number]  : David Lee PhD, Phone:  [phone]    Blood Culture - Blood, Arm, Right [[id number]]  (Normal) Collected: 06/07/23 1553    Specimen: Blood from Arm, Right Updated: 06/09/23 1601     Blood Culture No growth at 2 days    Protein Electrophoresis, Total [[id number]]  (Abnormal) Collected: 06/08/23 1218    Specimen: Blood Updated: 06/09/23 1510     Total Protein 5.0 g/dL      Albumin 2.4 g/dL      Alpha-1-Globulin 0.3 g/dL      Alpha-2-Globulin 0.7 g/dL      Beta Globulin 0.7 g/dL      Gamma Globulin 0.9 g/dL      M-Rick Not Observed g/dL      Globulin 2.6 g/dL      A/G Ratio 0.9     Please note Comment     Comment: Protein electrophoresis scan will follow via computer, mail, or   delivery.       Narrative:      Performed at:  [address] [place] [address]  [id number]  : David Lee PhD, Phone:  [phone]    Pathology Consultation [[id number]] Collected: 06/08/23 1218    Specimen: Blood, Venous Line Updated: 06/09/23 1442     Final Diagnosis --     Pancytopenia  No definite blasts identified       Case Report --     Surgical Pathology Report                         Case: [id number]                                  Authorizing Provider:  Cleo Gabriel APRN Collected:           06/08/2023 12:18 PM          Ordering Location:     [address]    Received:            06/09/2023 07:30  AM                                 MEDICAL INPATIENT                                                            Pathologist:           Genanro Haywood MD                                                            Specimen:    Blood, Venous Line                                                                         Lyme Disease Total Antibody With Reflex to Immunoassay [[id number]] Collected: 06/07/23 2246    Specimen: Blood Updated: 06/09/23 1411     Lyme Total Antibody EIA Negative     Comment: Lyme antibodies not detected. Reflex testing is not indicated.  No laboratory evidence of infection with B. burgdorferi (Lyme disease).  Negative results may occur in patients recently infected (less than  or equal to 14 days) with B. burgdorferi.  If recent infection is  suspected, repeat testing on a new sample collected in 7 to 14 days is  recommended.       Narrative:      Performed at:  [address] [place] [address]  [id number]  : David Lee PhD, Phone:  [phone]    POC Glucose Once [[id number]]  (Abnormal) Collected: 06/09/23 1221    Specimen: Blood Updated: 06/09/23 1223     Glucose 116 mg/dL      Comment: Serial Number: [id number]:  [id number]       Shobha-Barr Virus VCA Antibody Panel [[id number]]  (Abnormal) Collected: 06/07/23 1553    Specimen: Blood Updated: 06/09/23 1024     EBV VCA IgM Negative     EBV VCA IgG Positive     EBV Nuclear Ag Positive     EBV Early Ag Negative    POC Glucose Once [[id number]]  (Abnormal) Collected: 06/09/23 0843    Specimen: Blood Updated: 06/09/23 0845     Glucose 112 mg/dL      Comment: Serial Number: [id number]:  [id number]       CMV IgG IgM [[id number]] Collected: 06/07/23 2246    Specimen: Blood Updated: 06/09/23 0509     CMV IgG <0.60 U/mL      Comment:                                Negative          <0.60                                 Equivocal   0.60 - 0.69                                 Positive          >0.69        CMV  IgM <30.0 AU/mL      Comment:                                 Negative         <30.0                                  Equivocal  30.0 - 34.9                                  Positive         >34.9  A positive result is generally indicative of acute  infection, reactivation or persistent IgM production.       Narrative:      Performed at:  [address] [place] [address]  [id number]  : David Lee PhD, Phone:  [phone]    Manual Differential [[id number]]  (Abnormal) Collected: 06/09/23 0203    Specimen: Blood Updated: 06/09/23 0340     Neutrophil % 67.0 %      Lymphocyte % 3.0 %      Monocyte % 8.0 %      Eosinophil % 1.0 %      Bands %  18.0 %      Atypical Lymphocyte % 1.0 %      Plasma Cells % 2.0 %      Neutrophils Absolute 1.79 10*3/mm3      Lymphocytes Absolute 0.08 10*3/mm3      Monocytes Absolute 0.17 10*3/mm3      Eosinophils Absolute 0.02 10*3/mm3      RBC Morphology Normal     WBC Morphology Normal     Platelet Estimate Decreased    CBC & Differential [[id number]]  (Abnormal) Collected: 06/09/23 0203    Specimen: Blood Updated: 06/09/23 0340    Narrative:      The following orders were created for panel order CBC & Differential.  Procedure                               Abnormality         Status                     ---------                               -----------         ------                     CBC Auto Differential[[id number]]        Abnormal            Final result               Scan Slide[[id number]]                                       Final result                 Please view results for these tests on the individual orders.    Scan Slide [[id number]] Collected: 06/09/23 0203    Specimen: Blood Updated: 06/09/23 0340     Scan Slide --     Comment: See Manual Differential Results       CBC Auto Differential [[id number]]  (Abnormal) Collected: 06/09/23 0203    Specimen: Blood Updated: 06/09/23 0340     WBC 2.10 10*3/mm3      RBC 3.88 10*6/mm3      Hemoglobin 12.0  g/dL      Hematocrit 35.0 %      MCV 90.1 fL      MCH 31.0 pg      MCHC 34.3 g/dL      RDW 13.5 %      RDW-SD 45.1 fl      MPV 9.8 fL      Platelets 46 10*3/mm3     Narrative:      The previously reported component NRBC is no longer being reported. Previous result was 0.0 /100 WBC (Reference Range: 0.0-0.2 /100 WBC) on 6/9/2023 at 0259 EDT.    Sedimentation Rate [[id number]]  (Normal) Collected: 06/09/23 0203    Specimen: Blood Updated: 06/09/23 0331     Sed Rate 8 mm/hr     CK [[id number]]  (Abnormal) Collected: 06/09/23 0203    Specimen: Blood Updated: 06/09/23 0330     Creatine Kinase 3,767 U/L     Magnesium [[id number]]  (Normal) Collected: 06/09/23 0203    Specimen: Blood Updated: 06/09/23 0318     Magnesium 2.0 mg/dL     Comprehensive Metabolic Panel [[id number]]  (Abnormal) Collected: 06/09/23 0203    Specimen: Blood Updated: 06/09/23 0318     Glucose 146 mg/dL      BUN 13 mg/dL      Creatinine 0.88 mg/dL      Sodium 135 mmol/L      Potassium 3.2 mmol/L      Chloride 108 mmol/L      CO2 19.0 mmol/L      Calcium 7.1 mg/dL      Total Protein 4.8 g/dL      Albumin 2.2 g/dL      ALT (SGPT) 48 U/L      AST (SGOT) 195 U/L      Alkaline Phosphatase 42 U/L      Total Bilirubin 0.3 mg/dL      Globulin 2.6 gm/dL      A/G Ratio 0.8 g/dL      BUN/Creatinine Ratio 14.8     Anion Gap 8.0 mmol/L      eGFR 86.9 mL/min/1.73     Narrative:      GFR Normal >60  Chronic Kidney Disease <60  Kidney Failure <15    The GFR formula is only valid for adults with stable renal function between ages 18 and 70.    C-reactive Protein [[id number]]  (Abnormal) Collected: 06/09/23 0203    Specimen: Blood Updated: 06/09/23 0318     C-Reactive Protein 3.80 mg/dL     POC Glucose Once [[id number]]  (Abnormal) Collected: 06/08/23 2105    Specimen: Blood Updated: 06/08/23 2107     Glucose 160 mg/dL      Comment: Serial Number: [id number]:  [id number]        [[id number]]  (Abnormal) Collected: 06/08/23 1218    Specimen: Blood Updated: 06/08/23 1827      Creatine Kinase 6,193 U/L     POC Glucose Once [[id number]]  (Abnormal) Collected: 06/08/23 1736    Specimen: Blood Updated: 06/08/23 1738     Glucose 169 mg/dL      Comment: Serial Number: [id number]:  [id number]       Vitamin B12 [[id number]]  (Abnormal) Collected: 06/08/23 1218    Specimen: Blood Updated: 06/08/23 1654     Vitamin B-12 >2,000 pg/mL     Narrative:      Results may be falsely increased if patient taking Biotin.      Folate [[id number]]  (Normal) Collected: 06/08/23 1218    Specimen: Blood Updated: 06/08/23 1654     Folate >20.00 ng/mL     Narrative:      Results may be falsely increased if patient taking Biotin.      Blood Culture - Blood, Arm, Right [[id number]]  (Normal) Collected: 06/07/23 1639    Specimen: Blood from Arm, Right Updated: 06/08/23 1645     Blood Culture No growth at 24 hours    Narrative:      Less than seven (7) mL's of blood was collected.  Insufficient quantity may yield false negative results.    Haptoglobin [[id number]]  (Abnormal) Collected: 06/08/23 1218    Specimen: Blood Updated: 06/08/23 1641     Haptoglobin 267 mg/dL           PENDING RESULTS:     IMAGING REVIEWED:  CT Abdomen Pelvis Without Contrast    Result Date: 6/7/2023  1.There are some mildly dilated loops of small bowel with mild wall thickening left upper quadrant. Question enteritis. 2.Diverticulosis without diverticulitis. 3.Small hiatal hernia. 4.Hepatomegaly. Electronically Signed: Norma Hathaway  6/7/2023 8:05 PM EDT  Workstation ID: [id number]    CT Head Without Contrast    Result Date: 6/7/2023  Impression: 1.No acute intracranial abnormality on head CT. Age-appropriate volume loss. Mild to moderate amount of low-density periventricular and subcortical white matter is most commonly seen with chronic small vessel ischemic change. 2.MRI is more sensitive to evaluate for acute or subacute infarct. Electronically Signed: Norma Hathaway  6/7/2023 5:19 PM EDT  Workstation ID: [id number]      Assessment & Plan    ASSESSMENT:  1. Acute thrombocytopenia: Review of chart shows platelet count of 264,000 in February 2023.  Patient with platelet count of 43,000 upon presentation and now currently improved 46,000.  Normal hemoglobin and white blood cell count upon presentation but currently decreased to 2.10 and 12.0 g/dL.  No known heparin exposure.  Patient with elevated LFTs and hepatomegaly on the CT.  Febrile illness of presentation.  Additional work-up ordered to evaluate for hemolysis, nutritional deficiency, hematological malignancy.  Thus far no sign of nutritional deficiency or hemolysis.  Has a very elevated LDH and a low reticulocyte count suggesting bone marrow suppression.  He is also being evaluated for viral and tickborne illnesses.  Peripheral smear negative for blasts.  No M spike noted on SPEP.  2. Febrile illness: On IV doxycycline.  Blood cultures pending.  EBV/CMV not consistent with active infection.  Tick panel in progress but negative for Lyme.  3. Acute kidney injury: On IV hydration  4. Confusion and falls at home: CT of the head with no acute findings.  5. Multiple chronic conditions: Type 2 diabetes mellitus, hyperlipidemia, hypertension, hypothyroidism.  6. History of prostate cancer in 2011, history of squamous cell carcinoma of the skin.    PLAN:  1. Thrombocytopenia work-up in progress  2. Monitor CBC daily and transfuse for platelets less than 20,000 or signs of active bleeding.        Thank you for this consult. We will be happy to follow along with you.  We will continue to follow along with you      Had a normal platelet count in February 2023 and presents this time with platelets of 43,000.  Patient also has a febrile illness most likely contributing to his acute thrombocytopenia.  This is also accompanied by anemia and so far his work-up shows decrease reticulocyte count which suggest bone marrow suppression.  He is being evaluated for possible tickborne illness or viral illness.  He also  has elevated liver function test and very high LDH level.     Physical exam significant for mild confusion, acutely ill-appearing otherwise unremarkable  Reviewed his labs, imaging studies progress notes  Work-up for anemia thrombocytopenia ongoing.  Suspect this is most likely due to bone marrow suppression from acute illness.  He has bandemia on his differential counts with occasional plasma cells and metamyelocytes which could also be due to a highly stressed bone marrow    Clinically patient has improved.  Also he has had slight improvement in his CBCs as well  His physical exam has not changed  His appetite has improved  Continue daily CBCs  Mental status has also improved  So far there is now evidence of nutritional deficiencies.  Has a low reticulocyte count suggesting bone marrow suppression  We will continue to monitor his counts very closely        Electronically signed by Hallie Valencia MD, 06/10/23, 12:02 PM EDT.

## 2023-06-09 NOTE — CASE MANAGEMENT/SOCIAL WORK
Continued Stay Note  KASIE Sandy     Patient Name: Mark Marie  MRN: [id number]  Today's Date: 6/9/2023    Admit Date: 6/7/2023    Plan: DC Plan: Accepted at [facility].  Bed available Monday 6/12/23.  No precert needed.  PASRR approved.   Discharge Plan       Row Name 06/09/23 1114       Plan    Plan DC Plan: Accepted at [facility].  Bed available Monday 6/12/23.  No precert needed.  PASRR approved.    Patient/Family in Agreement with Plan yes    Plan Comments Met with patient at bedside. Discussed PT reccomendations for SNF.  List given.  Patient chose [facility].  Referral sent in basket and faxed.  Spoke with Fernando who said they will have bed available on Monday 6/12/23.  Barriers to discharge: IV abx.  IV fluids.             Expected Discharge Date and Time       Expected Discharge Date Expected Discharge Time    Jun 11, 2023         Rand Wayne RN     Office Phone [phone]  Office Cell [phone]

## 2023-06-09 NOTE — PROGRESS NOTES
[facility] Medicine Services   Daily Progress Note    Patient Name: Mark Marie  : 1940  MRN: [id number]  Primary Care Physician:  Diana Linares MD  Date of admission: 2023    Subjective      Admitted for febrile illness and thrombocytopenia in the setting of known tick exposure    Did okay overnight.  Seems to have defervesced.  Still having diffuse muscle aches, but his leg pain is improving.  He has continued to have some episodes of epigastric pain with related shortness of breath.  These are brief and self-limited.  He does have a hiatal hernia on his admission imaging.  CRP is elevated at 3.8.  His sed rate is normal.  CK was over 6000 on admission, down to 3700 and some change today.  Hemoglobin is holding steady at 12.  Platelet count is a little bit improved at 46.  Transaminases are still mildly elevated.  Potassium is a bit low at 3.2.  He continues to work with therapy.  Tolerating his diet fairly well.  Hematology is following.  All of his tickborne illness labs are still pending of course.  Discussed the case with the bedside nursing staff. No other acute concerns.    Objective      Vitals:   Temp:  [97.7 °F (36.5 °C)-99 °F (37.2 °C)] 97.7 °F (36.5 °C)  Heart Rate:  [78-94] 79  Resp:  [18-26] 20  BP: ()/(51-74) 98/51    Physical Exam   GEN: Ill-appearing elderly gentleman, no acute distress  HEENT: NCAT, PERRLA, dry mucous membranes  NECK: Supple, midline trachea  CARD: RRR, no appreciable M/R/G, no peripheral edema  PULM: Fairly CTAB, occasional ronchus that clears with cough, non-distressed  ABD: soft, slight mid-abdominal tenderness to exam, normoactive bowel sounds throughout  SKIN: Warm, dry, slight pallor  NEURO: Grossly intact, non-focal exam  PSYCH: Pleasant     Result Review    I have personally reviewed the results from the time of this admission to 2023 17:23 EDT and agree with these findings:  [x]  Laboratory  [x]  Microbiology  [x]  Radiology  [x]   EKG/Telemetry   [x]  Cardiology/Vascular   []  Pathology  []  Old records  []  Other:    CT Abdomen Pelvis Without Contrast    Result Date: 6/7/2023  CT ABDOMEN PELVIS WO CONTRAST Date of Exam: 6/7/2023 7:45 PM EDT Indication: Fever, history of recent nausea and vomiting. Right-sided abdominal pain. Comparison: None available. Technique: Axial CT images were obtained of the abdomen and pelvis without the administration of contrast. Sagittal and coronal reconstructions were performed.  Automated exposure control and iterative reconstruction methods were used. Findings: Lower Thorax: There is mild linear opacity lung bases, scarring versus subsegmental atelectasis. Peritoneum: No free air or free fluid. There is hernia mesh material in the mid abdomen. There are sutures in the low pelvis which may be related to previous hernia repair as well. Appendix: Appendix is not well seen. No inflammatory findings in the right lower quadrant.. Kidneys: No hydronephrosis. No renal calculi. No focal renal lesions. Ureters: No obstructing calculi or mass. Urinary bladder: Urinary bladder has normal appearance on CT given degree of distention. Liver: No focal hepatic lesions. Hepatomegaly. Gallbladder and bile ducts: No gallstones. No bile duct dilatation. Gallbladder has normal appearance. Spleen: Spleen is normal size.  No focal splenic lesions. Adrenal glands: Unremarkable. Pancreas: No focal masses.  No pancreatic duct dilation. No surrounding inflammation. Abdominal aorta and Vascular Structures: No aneurysmal dilation. Moderate atherosclerotic disease. Stomach and Bowel:  Small hiatal hernia. There is some mildly dilated small bowel loops in the left upper quadrant with questionable wall thickening. The measure up to 2.9 cm. No transition point is seen. No significant bowel wall thickening.  Ligament of Treitz has normal anatomic position. There are numerous diverticuli in the sigmoid: And distal descending colon. There are  less numerous seen in the transverse and ascending colon. None are acutely inflamed. Reproductive Organs: There are fiducial markers within the prostate gland. Lymph nodes: No pathologically enlarged lymph nodes. Soft tissues: Unremarkable. Osseous structures: No aggressive focal lytic or sclerotic osseous lesions. Evaluation of bowel and solid organs is limited without contrast administration.     Impression: 1.There are some mildly dilated loops of small bowel with mild wall thickening left upper quadrant. Question enteritis. 2.Diverticulosis without diverticulitis. 3.Small hiatal hernia. 4.Hepatomegaly. Electronically Signed: Normalili Hathaway  6/7/2023 8:05 PM EDT  Workstation ID: [id number]      Assessment & Plan      doxycycline, 100 mg, Intravenous, Q12H  famotidine, 20 mg, Oral, BID AC  insulin lispro, 2-7 Units, Subcutaneous, 4x Daily AC & at Bedtime  LORazepam, 1 mg, Oral, BID  piperacillin-tazobactam, 3.375 g, Intravenous, Once  piperacillin-tazobactam, 3.375 g, Intravenous, Q8H  sodium chloride, 10 mL, Intravenous, Q12H       sodium chloride, 100 mL/hr, Last Rate: 100 mL/hr (06/08/23 2230)         Active Hospital Problems    Diagnosis  POA   • **Sepsis, due to unspecified organism, unspecified whether acute organ dysfunction present [A41.9]  Yes   • Non-traumatic rhabdomyolysis [M62.82]  Yes   • Febrile illness [R50.9]  Yes   • Thrombocytopenia [D69.6]  Yes   • Elevated liver transaminase level [R74.01]  Yes   • Dysphagia [R13.10]  Yes   • Acute kidney injury [N17.9]  Yes   • Confusion [R41.0]  Yes   • History of prostate cancer [Z85.46]  Not Applicable   • Memory change [R41.3]  Yes   • Hyperlipidemia [E78.5]  Yes   • Hypertension [I10]  Yes   • Hypothyroidism [E03.9]  Yes      Resolved Hospital Problems   No resolved problems to display.       Plan:   Febrile illness with complications of elevated liver transaminase and thrombocytopenia.    Atraumatic rhabdomyolysis.  Epigastric pain  -Differentials include  tickborne illness versus viral illness such as cytomegalovirus or Shobha-Barr virus  -Patient did end up recalling taking one or two ticks off a couple of weeks ago.   -COVID 19 screen and respiratory viral DNA panel are negative  -Chest x-ray was unremarkable  -Blood cultures are negative thus far  -CT abdomen pelvis showing possible enteritis. I don't this is the primary source of all of his presenting symptoms/findings but certainly worth watching.  -Continue IV Zosyn in the interim  -IV doxycycline to cover for tickborne illness  -Ehrlichia PCR and antibody panel  -Eloy mounted spotted fever antibody panel  -Lyme antibody panel  -CMV PCR and IgM  -Shobha-Barr virus PCR and antibody panel  -Monitor serum CK  -Monitor renal function and electrolytes daily      Episodic periods of dysphagia with shortness of breath that last several seconds to several minutes  -Modified diet as per ST     Acute kidney injury  -Possible dehydration due to decreased oral intake  -Gentle hydration     Confusion at at home and on admission, along with weakness and falls  -Resolved  -The head CT did not show any acute findings     History of early dementia  -Continue donepezil when patient has been cleared by speech therapy     Type 2 diabetes  -Continue metformin   -Sliding scale insulin     Hyperlipidemia   -Continue simvastatin     Hypertension  -Continue losartan     Hypothyroidism  -Continue levothyroxine      History of prostate cancer  -Continue tamsulosin     I believe some of his epigastric pain episodes are due to some anxiety.  He may also be having some esophageal spasm or sliding hiatal hernia concerns.  We will start some Ativan to see if this helps.    PT/OT    DVT prophylaxis:  Mechanical DVT prophylaxis orders are present.    CODE STATUS:    Level Of Support Discussed With: Patient  Code Status (Patient has no pulse and is not breathing): CPR (Attempt to Resuscitate)  Medical Interventions (Patient has pulse or is  breathing): Full Support  Release to patient: Routine Release    Disposition: Referral for skilled rehab at discharge.  He needs 3 midnights, and he has a bed available at [facility] on Monday.      Electronically signed by Thang Clayton MD, 06/09/23, 17:23 EDT.  [facility]

## 2023-06-09 NOTE — DISCHARGE PLACEMENT REQUEST
"Kayden Marley (82 y.o. Male)       Date of Birth   1940    Social Security Number       Address   [address] [place] [address]    Home Phone   [phone]    MRN   [id number]       Buddhist   [employer]    Marital Status                               Admission Date   6/7/23    Admission Type   Emergency    Admitting Provider   Thang Clayton MD    Attending Provider   Thang Clayton MD    Department, Room/Bed   [facility] 2C MEDICAL INPATIENT, 251/1       Discharge Date       Discharge Disposition       Discharge Destination                                 Attending Provider: Thang Clayton MD    Allergies: Ramipril    Isolation: None   Infection: None   Code Status: CPR    Ht: 162.6 cm (64\")   Wt: 85.2 kg (187 lb 13.3 oz)    Admission Cmt: None   Principal Problem: Sepsis, due to unspecified organism, unspecified whether acute organ dysfunction present [A41.9]                   Active Insurance as of 6/7/2023       Primary Coverage       Payor Plan Insurance Group Employer/Plan Group    MEDICARE MEDICARE A & B        Payor Plan Address Payor Plan Phone Number Payor Plan Fax Number Effective Dates    [address] [phone]  7/1/2007 - None Entered    [facility] [id number]         Subscriber Name Subscriber Birth Date Member ID       KAYDEN MARLEY 1940 [id number]               Secondary Coverage       Payor Plan Insurance Group Employer/Plan Group    [employer] COMMERCIAL [employer]  SUP               Payor Plan Address Payor Plan Phone Number Payor Plan Fax Number Effective Dates    [address] [address]   12/1/2015 - None Entered    [facility] [phone]         Subscriber Name Subscriber Birth Date Member ID       KAYDEN MARLEY 1940 [id number]                     Emergency Contacts        (Rel.) Home Phone Work Phone Mobile Phone    BRIANA MARLEY (Spouse) [phone] -- --    JOHANNAOZZIE (Daughter) [phone] -- [phone] "             Results    Collected Updated Procedure    06/07/2023 1556 06/07/2023 1702 Respiratory Panel PCR w/COVID-19(SARS-CoV-2) BHUPINDER/NADEEM/CAMPOS/PAD/COR/MAD/ROBERTO In-House, NP Swab in UTM/VTM, 3-4 HR TAT - Swab, Nasopharynx [[id number]]    Swab from Nasopharynx    Component Value   ADENOVIRUS, PCR Not Detected   Coronavirus 229E Not Detected   Coronavirus HKU1 Not Detected   Coronavirus NL63 Not Detected   Coronavirus OC43 Not Detected   COVID19 Not Detected   Human Metapneumovirus Not Detected   Human Rhinovirus/Enterovirus Not Detected   Influenza A PCR Not Detected   Influenza B PCR Not Detected   Parainfluenza Virus 1 Not Detected   Parainfluenza Virus 2 Not Detected   Parainfluenza Virus 3 Not Detected   Parainfluenza Virus 4 Not Detected   RSV, PCR Not Detected   Bordetella pertussis pcr Not Detected   Bordetella parapertussis PCR Not Detected   Chlamydophila pneumoniae PCR Not Detected   Mycoplasma pneumo by PCR Not Detected

## 2023-06-09 NOTE — DISCHARGE PLACEMENT REQUEST
"Kayden Marley (82 y.o. Male)       Date of Birth   1940    Social Security Number       Address   [address] [place] [address]    Home Phone   [phone]    MRN   [id number]       [employer]ology   [employer]    Marital Status                               Admission Date   6/7/23    Admission Type   Emergency    Admitting Provider   Thang Clayton MD    Attending Provider   Thang Clayton MD    Department, Room/Bed   [facility] 2C MEDICAL INPATIENT, 251/1       Discharge Date       Discharge Disposition       Discharge Destination                                 Attending Provider: Thang Clayton MD    Allergies: Ramipril    Isolation: None   Infection: None   Code Status: CPR    Ht: 162.6 cm (64\")   Wt: 85.2 kg (187 lb 13.3 oz)    Admission Cmt: None   Principal Problem: Sepsis, due to unspecified organism, unspecified whether acute organ dysfunction present [A41.9]                   Active Insurance as of 6/7/2023       Primary Coverage       Payor Plan Insurance Group Employer/Plan Group    MEDICARE MEDICARE A & B        Payor Plan Address Payor Plan Phone Number Payor Plan Fax Number Effective Dates    [address] [phone]  7/1/2007 - None Entered    [facility] [id number]         Subscriber Name Subscriber Birth Date Member ID       KAYDEN MARLEY 1940 [id number]               Secondary Coverage       Payor Plan Insurance Group Employer/Plan Group    [employer] COMMERCIAL A[employer] HE[employer] AND LIFE  SUP               Payor Plan Address Payor Plan Phone Number Payor Plan Fax Number Effective Dates    [address] [address]   12/1/2015 - None Entered    [facility] [id number]         Subscriber Name Subscriber Birth Date Member ID       KAYDEN MARLEY 1940 [id number]                     Emergency Contacts        (Rel.) Home Phone Work Phone Mobile Phone    BRIANA MARLEY (Spouse) [phone] -- --    JOHANNAOZZIE (Daughter) [phone] -- [phone] "                 History & Physical        Radha Hutson APRN at 23 1918       Attestation signed by Thang Clayton MD at 23 1729    I have personally evaluated/examined the patient on the day of service and staffed the case in detail with the advanced care practitioner.  I have also personally reviewed all medical data and imaging studies.  I agree with the history, physical exam, assessment, and plan of care as documented with the following additions, if any.    Physical Exam:  GEN: Ill-appearing elderly gentleman, no acute distress  HEENT: NCAT, PERRLA, dry mucous membranes  NECK: Supple, midline trachea  CARD: RRR, mildly tachycardic, no appreciable M/R/G, no peripheral edema  PULM: Fairly CTAB, occasional ronchus that clears with cough, non-distressed  ABD: soft, slight mid-abdominal tenderness to exam, normoactive bowel sounds throughout  SKIN: Warm, dry, slight allor  NEURO: Grossly intact, non-focal exam  PSYCH: Pleasant     Agree with admission for antibiotics pending further diagnostics.  This does look a lot like ehrlichiosis. He does endorse taking off a tick a could of weeks ago after further discussion. Supportive care in the interim. HEME consult for thrombocytopenia. May need to consider rehab depending on how he does over the next couple of days.                 [facility]   HISTORY AND PHYSICAL    Patient Name: Mark Marie  : 1940  MRN: [id number]  Primary Care Physician:  Diana Linares MD  Date of admission: 2023    Subjective  Subjective     Chief Complaint: Fever, chills, weakness, falls, nausea, vomiting, diarrhea difficulty swallowing    History of Present Illness    This is a 82-year-old male who presents to the hospital on 2023 with complaints of worsening fever, chills, weakness, falls.  Patient says he has been feeling poorly for over a week but really started feeling bad on Friday.  Complains of an episode of nausea and diarrhea on Saturday.   Now having periodic episodes of difficulty breathing with the inability to swallow, hiccups and some congestion.  Has general abdominal discomfort but no overt abdominal pain.  Denies urinary symptoms.  [place] not aware of any recent tick bites but did recently take a tick off of his wife.  Denies any recent travel or sick contacts.  Reported confusion at home and when first admitted.   Does have a history of early dementia    Review of Systems   Constitutional:  Positive for chills, fatigue and fever.   HENT:  Positive for congestion and trouble swallowing.    Eyes: Negative.    Respiratory:  Positive for choking and shortness of breath.    Cardiovascular: Negative.    Gastrointestinal:  Positive for diarrhea, nausea and vomiting.   Endocrine: Negative.    Genitourinary: Negative.    Musculoskeletal:  Positive for arthralgias.   Skin: Negative.    Neurological:  Positive for weakness.        Fall at home   Psychiatric/Behavioral:  Positive for confusion.    All other systems reviewed and are negative.     Personal History     Past Medical History:   Diagnosis Date    Cataract removed 16 years ago    Diabetes mellitus     HL (hearing loss) have worn hearing aids for 16 years    Hyperlipidemia     Hypertension     Hypothyroidism     Joint pain     Low back pain     Prostate cancer 2011    Squamous cell carcinoma of skin     right temple       Past Surgical History:   Procedure Laterality Date    HEMORROIDECTOMY      HERNIA REPAIR      inguinal and umbilical    KNEE SURGERY Bilateral     trimmed cartilage    VASECTOMY  50 years ago       Family History: family history includes Cancer in his brother, brother, and daughter; Heart disease in his father; Hypertension in his brother, father, and mother; Other in his mother. Otherwise pertinent FHx was reviewed and not pertinent to current issue.    Social History:  reports that he has never smoked. He has never used smokeless tobacco. He  reports that he does not currently use alcohol. He reports that he does not use drugs.    Home Medications:  donepezil, levothyroxine, losartan, metFORMIN, promethazine, simvastatin, tamsulosin, and traMADol    Allergies:  Allergies   Allergen Reactions    Ramipril Swelling     Leg swelling       Objective   Objective     Vitals:   Temp:  [100.3 °F (37.9 °C)-103.2 °F (39.6 °C)] 100.3 °F (37.9 °C)  Heart Rate:  [102-109] 103  Resp:  [18-26] 18  BP: (134-148)/(59-72) 142/70    Physical Exam  Vitals and nursing note reviewed.   Constitutional:       Appearance: He is normal weight. He is ill-appearing.   HENT:      Head: Normocephalic and atraumatic.      Mouth/Throat:      Mouth: Mucous membranes are dry.      Comments: White coating on tongue  Eyes:      Extraocular Movements: Extraocular movements intact.      Conjunctiva/sclera: Conjunctivae normal.      Pupils: Pupils are equal, round, and reactive to light.   Cardiovascular:      Rate and Rhythm: Tachycardia present.   Pulmonary:      Effort: Pulmonary effort is normal.      Breath sounds: Rhonchi present.      Comments: Patient had an episode of tachypnea and difficulty swallowing during my assessment which lasted for approximately 10 seconds    Abdominal:      General: Bowel sounds are normal. There is no distension.      Palpations: Abdomen is soft.      Tenderness: There is abdominal tenderness.   Musculoskeletal:         General: Normal range of motion.      Cervical back: Neck supple.      Right lower leg: No edema.      Left lower leg: No edema.   Skin:     Coloration: Skin is pale.   Neurological:      Mental Status: He is alert and oriented to person, place, and time.       Result Review   Result Review:  I have personally reviewed the results from the time of this admission to 6/7/2023 19:42 EDT and agree with these findings:  [x]  Laboratory list / accordion  [x]  Microbiology  [x]  Radiology  []  EKG/Telemetry   []  Cardiology/Vascular   []   Pathology  []  Old records  []  Other:  Most notable findings include: Fever as high as 103.2 degrees.  Creatinine of 1.27, BUN 27, AST of 222 and ALT of 53, left skeletal count of 3.9 and platelets 43.  Bands of 22.  Urinalysis was unremarkable.  COVID-19 screen respiratory viral DNA panel are negative      Assessment & Plan  Assessment / Plan     Brief Patient Summary:  Mark Marie is a 82 y.o. male who presents to the hospital on 6/7/2023 with complaints of fever, chills, weakness, falls at home nausea vomiting diarrhea with episodes of shortness of breath and choking.  Is not aware of a tick bite [place] family members with tick bites.    Active Hospital Problems:  Active Hospital Problems    Diagnosis     **Sepsis, due to unspecified organism, unspecified whether acute organ dysfunction present     Febrile illness     Thrombocytopenia     Elevated liver transaminase level     Dysphagia     Acute kidney injury     Confusion     History of prostate cancer     Memory change     Hyperlipidemia     Hypertension     Hypothyroidism      Plan:     Febrile illness with complications of elevated liver transaminase and thrombocytopenia.  -Differentials include tickborne illness versus viral illness such as cytomegalovirus or Shobha-Barr virus  -Patient is unaware of recurrent tick bite but has had tick bites in the [place] and has family members with recent tick bites  -COVID 19 screen and respiratory viral DNA panel are negative  -Chest x-ray was unremarkable  -Blood cultures are pending  -ED has ordered a CT of the abdomen pelvis which is pending since patient did have significant bandemia  -Start IV Zosyn waiting on CT and work-up  -IV doxycycline to cover for tickborne illness  -Ehrlichia PCR and antibody panel  -Eloy mounted spotted fever antibody panel  -Lyme antibody panel  -CMV PCR and IgM  -Shobha-Barr virus PCR and antibody panel    Episodic periods of  dysphagia with shortness of breath that last several seconds to several minutes  -1 episode witnessed during my assessment lasted approximately 10 seconds  -Keep patient n.p.o. until speech therapy can see the patient    Acute kidney injury  -Possible dehydration due to decreased oral intake  -Gentle hydration    Confusion at at home and on admission, along with weakness and falls  -Resolved  -The head CT did not show any acute findings    History of early dementia  -Continue donepezil when patient has been cleared by speech therapy    Type 2 diabetes  -Continue metformin when patient is cleared by speech therapy  -Sliding scale insulin    Hyperlipidemia   -Continue simvastatin when patient is cleared by speech therapy    Hypertension  -Continue losartan when patient is cleared by speech therapy    Hypothyroidism  -Continue levothyroxine when patient is cleared by speech therapy    History of prostate cancer  -Continue tamsulosin when patient is cleared by speech therapy    DVT prophylaxis:  Mechanical DVT prophylaxis orders are present.    CODE STATUS:       Admission Status:  I believe this patient meets observation status.    MARLEE Montiel    Electronically signed by Thang Clayton MD at 06/08/23 1729       Physician Progress Notes (last 24 hours)  Notes from 06/08/23 1047 through 06/09/23 1047   No notes of this type exist for this encounter.       Consult Notes (last 24 hours)  Notes from 06/08/23 1047 through 06/09/23 1047   No notes of this type exist for this encounter.          Physical Therapy Notes (last 24 hours)        Evelyn Mancilla PT at 06/08/23 1433  Version 1 of 1         Goal Outcome Evaluation:  Plan of Care Reviewed With: patient, sibling  Pt presents as an 81 y/o M admitted to [facility] on 6/7/23 with complaints of fever, chills, weakness, falls at home, nausea vomiting, diarrhea, and episodes of shortness of breath and choking. Pt has triggered sepsis and tick borne diseases are  "being ruled out. Hematology is following pt. CT head negative. CXR negative. CT abdomen shows inflammation. Pt has elevated liver transaminase and thrombocytopenia. Pt c/o feeling terrible with soreness in his legs to the touch. Pt A and O x 4. Gross BLE strength of 3-/5 and early fatigue with minimal exertion noted. At baseline, pt lives with spouse who has sternal precautions from recent CABG and is beginning cardiac rehab; spouse will be unable to physically assist pt at home. Pt is driving and independent in community without AD in [facility] with one step entry. Today, pt requiring min assist for transfers and 6 feet of lateral side-stepping with rolling walker. Pt requires more than one attempt to achieve standing and feels as if his legs will \"buckle\" in standing. Pt is far below his baseline for mobility. PT will follow pt. Occupational Therapy Eval is recommended- hospitalist aware. PT recommendation at this time is Skilled Nursing Facility.                       Electronically signed by Evelyn Mancilla, PT at 23 1443       Evelyn Mancilla, PT at 23 1446  Version 1 of 1         Patient Name: Mark Marie  : 1940    MRN: [id number]                              Today's Date: 2023       Admit Date: 2023    Visit Dx:     ICD-10-CM ICD-9-CM   1. Sepsis, due to unspecified organism, unspecified whether acute organ dysfunction present  A41.9 038.9     995.91   2. Bandemia  D72.825 288.66   3. Dehydration  E86.0 276.51   4. Acute kidney injury  N17.9 584.9   5. Thrombocytopenia  D69.6 287.5     Patient Active Problem List   Diagnosis    Right calf pain    Disorder associated with type 2 diabetes mellitus    Hyperlipidemia    Hypertension    Hypothyroidism    Wellness examination    Chronic bilateral low back pain without sciatica    Memory change    Medicare annual wellness visit, subsequent    Clinical diagnosis of COVID-19    Lumbar radiculopathy    Basal cell carcinoma, face    Crushing " injury of right middle finger    Fatigue    Need for hepatitis C screening test    Pneumonia due to infectious organism    Dyspnea    Confusion    Sepsis, due to unspecified organism, unspecified whether acute organ dysfunction present    Febrile illness    Thrombocytopenia    Elevated liver transaminase level    Dysphagia    Acute kidney injury    History of prostate cancer     Past Medical History:   Diagnosis Date    Cataract removed 16 years ago    Diabetes mellitus     HL (hearing loss) have worn hearing aids for 16 years    Hyperlipidemia     Hypertension     Hypothyroidism     Joint pain     Low back pain     Prostate cancer 2011    Squamous cell carcinoma of skin     right temple     Past Surgical History:   Procedure Laterality Date    HEMORROIDECTOMY      HERNIA REPAIR      inguinal and umbilical    KNEE SURGERY Bilateral     trimmed cartilage    VASECTOMY  50 years ago      General Information       Row Name 06/08/23 1419          Physical Therapy Time and Intention    Document Type evaluation  -BR     Mode of Treatment physical therapy  -BR       Row Name 06/08/23 1419          General Information    Patient Profile Reviewed yes  -BR     Prior Level of Function independent:;community mobility;driving;all household mobility  -BR     Existing Precautions/Restrictions fall;swallow precautions  -BR       Row Name 06/08/23 1419          Living Environment    People in Home spouse  Pt's spouse has sternal precautions from recent CABG and thus is unable to physically assist pt.  -BR       Row Name 06/08/23 1419          Home Main Entrance    Number of Stairs, Main Entrance one  -BR       Row Name 06/08/23 1419          Stairs Within Home, Primary    Number of Stairs, Within Home, Primary none  -BR       Row Name 06/08/23 1419          Cognition    Orientation Status (Cognition) oriented x 4  -BR       Row Name 06/08/23 1419          Safety Issues, Functional Mobility    Impairments Affecting Function (Mobility)  balance;endurance/activity tolerance;strength;range of motion (ROM);postural/trunk control;pain;shortness of breath  -BR               User Key  (r) = Recorded By, (t) = Taken By, (c) = Cosigned By      Initials Name Provider Type    Evelyn Logan PT Physical Therapist                   Mobility       Row Name 06/08/23 1426          Bed Mobility    Bed Mobility supine-sit-supine  -BR     Supine-Sit-Supine Richardson (Bed Mobility) contact guard  -BR     Assistive Device (Bed Mobility) head of bed elevated;bed rails  -BR       Row Name 06/08/23 1426          Sit-Stand Transfer    Sit-Stand Richardson (Transfers) minimum assist (75% patient effort)  -BR     Assistive Device (Sit-Stand Transfers) walker, front-wheeled  -BR     Comment, (Sit-Stand Transfer) Pt required more than one attempt to achieve standing.  -BR       Row Name 06/08/23 1426          Gait/Stairs (Locomotion)    Richardson Level (Gait) minimum assist (75% patient effort);1 person assist  -BR     Assistive Device (Gait) walker, front-wheeled  -BR     Distance in Feet (Gait) 6 feet of lateral sidestepping from middle of bed to head of bed with pt feeling as ik knees were about to buckle.  -BR     Deviations/Abnormal Patterns (Gait) other (see comments);dariel decreased  decreased foot clearance BLE  -BR     Bilateral Gait Deviations heel strike decreased  -BR               User Key  (r) = Recorded By, (t) = Taken By, (c) = Cosigned By      Initials Name Provider Type    Evelyn Logan PT Physical Therapist                   Obj/Interventions       Row Name 06/08/23 1430          Range of Motion Comprehensive    General Range of Motion bilateral lower extremity ROM WFL  -BR       Row Name 06/08/23 1430          Strength Comprehensive (MMT)    Comment, General Manual Muscle Testing (MMT) Assessment bilateral hips 3-/5, quads 3-/5 BLE, ankle PF/DF 3/5 BLE  -BR       Row Name 06/08/23 1430          Balance    Balance Assessment sitting  static balance;sitting dynamic balance;standing static balance;standing dynamic balance;sit to stand dynamic balance  -BR     Static Sitting Balance standby assist  -BR     Dynamic Sitting Balance minimal assist  -BR     Position, Sitting Balance unsupported;sitting edge of bed  -BR     Sit to Stand Dynamic Balance minimal assist  -BR     Static Standing Balance minimal assist  -BR     Dynamic Standing Balance moderate assist  -BR     Position/Device Used, Standing Balance walker, rolling  -BR       Row Name 06/08/23 1430          Sensory Assessment (Somatosensory)    Sensory Assessment (Somatosensory) LE sensation intact  -BR               User Key  (r) = Recorded By, (t) = Taken By, (c) = Cosigned By      Initials Name Provider Type    BR Evelyn Mancilla, PT Physical Therapist                   Goals/Plan       Row Name 06/08/23 1443          Bed Mobility Goal 1 (PT)    Activity/Assistive Device (Bed Mobility Goal 1, PT) bed mobility activities, all  -BR     [place] Level/Cues Needed (Bed Mobility Goal 1, PT) modified independence  -BR     Time Frame (Bed Mobility Goal 1, PT) long term goal (LTG);2 weeks  -BR       Row Name 06/08/23 1443          Transfer Goal 1 (PT)    Activity/Assistive Device (Transfer Goal 1, PT) transfers, all  -BR     [place] Level/Cues Needed (Transfer Goal 1, PT) contact guard required  -BR     Time Frame (Transfer Goal 1, PT) long term goal (LTG);2 weeks  -BR       Row Name 06/08/23 1443          Gait Training Goal 1 (PT)    Activity/Assistive Device (Gait Training Goal 1, PT) gait (walking locomotion);assistive device use  -BR     [place] Level (Gait Training Goal 1, PT) contact guard required  -BR     Distance (Gait Training Goal 1, PT) 50  -BR     Time Frame (Gait Training Goal 1, PT) long term goal (LTG);2 weeks  -BR       Row Name 06/08/23 1443          Stairs Goal 1 (PT)    Activity/Assistive Device (Stairs Goal 1, PT) stairs, all skills  -BR     [place]  Level/Cues Needed (Stairs Goal 1, PT) contact guard required  -BR     Number of Stairs (Stairs Goal 1, PT) 1  -BR     Time Frame (Stairs Goal 1, PT) long term goal (LTG);2 weeks  -BR       Row Name 06/08/23 1443          Therapy Assessment/Plan (PT)    Planned Therapy Interventions (PT) balance training;bed mobility training;gait training;home exercise program;patient/family education;neuromuscular re-education;transfer training;stair training;strengthening;stretching;ROM (range of motion)  -BR               User Key  (r) = Recorded By, (t) = Taken By, (c) = Cosigned By      Initials Name Provider Type    BR Evelyn Mancilla, PT Physical Therapist                   Clinical Impression       Row Name 06/08/23 1431          Pain    Pretreatment Pain Rating 4/10  -BR     Posttreatment Pain Rating 5/10  -BR     Pain Location other (see comments)  Pt reports BLE as sore to the touch.  -BR     Pain Location - epigastrium  -BR       Row Name 06/08/23 1443 06/08/23 1431       Plan of Care Review    Plan of Care Reviewed With -- patient;sibling  -BR    Outcome Evaluation Pt presents as an 83 y/o M admitted to [facility] on [facility]/7/23 with complaints of fever, chills, weakness, falls at home, nausea vomiting, diarrhea, and episodes of shortness of breath and choking. Pt has triggered sepsis and tick borne diseases are being ruled out. Hematology is following pt. CT head negative. CXR negative. CT abdomen shows inflammation. Pt has elevated liver transaminase and thrombocytopenia. Pt c/o feeling terrible with soreness in his legs to the touch. Pt A and O x 4. Gross BLE strength of 3-/5 and early fatigue with minimal exertion noted. At baseline, pt lives with spouse who has sternal precautions from recent CABG and is beginning cardiac rehab; spouse will be unable to physically assist pt at home. Pt is driving and independent in community without AD in [facility] with one step entry. Today, pt requiring min assist for transfers and 6 feet of  "lateral side-stepping with rolling walker. Pt requires more than one attempt to achieve standing and feels as if his legs will \"buckle\" in standing. Pt is far below his baseline for mobility. PT will follow pt. Occupational Therapy Eval is recommended- hospitalist aware. PT recommendation at this time is Skilled Nursing Facility.  -BR --      Row Name 06/08/23 1431          Therapy Assessment/Plan (PT)    Rehab Potential (PT) good, to achieve stated therapy goals  -BR     Criteria for Skilled Interventions Met (PT) yes;meets criteria;skilled treatment is necessary  -BR     Therapy Frequency (PT) 5 times/wk  -BR     Predicted Duration of Therapy Intervention (PT) until D/C  -BR       Row Name 06/08/23 1431          Vital Signs    Pre Systolic BP Rehab 120  -BR     Pre Treatment Diastolic BP 62  -BR     Pretreatment Heart Rate (beats/min) 92  -BR     Pre SpO2 (%) 95  -BR     O2 Delivery Pre Treatment room air  -BR     Intra SpO2 (%) 95  -BR     O2 Delivery Intra Treatment room air  -BR     Post SpO2 (%) 95  -BR     O2 Delivery Post Treatment room air  -BR     Pre Patient Position Supine  -BR     Intra Patient Position Standing  -BR     Post Patient Position Supine  -BR       Row Name 06/08/23 1431          Positioning and Restraints    Pre-Treatment Position in bed  -BR     Post Treatment Position bed  -BR     In Bed notified nsg;side rails up x3;exit alarm on;encouraged to call for assist;call light within reach;with family/caregiver;fowlers  -BR               User Key  (r) = Recorded By, (t) = Taken By, (c) = Cosigned By      Initials Name Provider Type    BR Evelyn Mancilla, PT Physical Therapist                   Outcome Measures       Row Name 06/08/23 1444          How much help from another person do you currently need...    Turning from your back to your side while in flat bed without using bedrails? 4  -BR     Moving from lying on back to sitting on the side of a flat bed without bedrails? 3  -BR     Moving " to and from a bed to a chair (including a wheelchair)? 3  -BR     Standing up from a chair using your arms (e.g., wheelchair, bedside chair)? 3  -BR     Climbing 3-5 steps with a railing? 2  -BR     To walk in hospital room? 2  -BR     AM-PAC 6 Clicks Score (PT) 17  -BR     Highest level of mobility 5 --> Static standing  -BR       Row Name 06/08/23 1444          Functional Assessment    Outcome Measure Options AM-PAC 6 Clicks Basic Mobility (PT)  -BR               User Key  (r) = Recorded By, (t) = Taken By, (c) = Cosigned By      Initials Name Provider Type    BR Evelyn Mancilla, PT Physical Therapist                                 Physical Therapy Education       Title: PT OT SLP Therapies (Done)       Topic: Physical Therapy (Done)       Point: Mobility training (Done)       Learning Progress Summary             Patient Acceptance, E,D, VU,DU by BR at 6/8/2023 1445   Family Acceptance, E,D, VU,DU by BR at 6/8/2023 1445                         Point: Home exercise program (Done)       Learning Progress Summary             Patient Acceptance, E,D, VU,DU by BR at 6/8/2023 1445   Family Acceptance, E,D, VU,DU by BR at 6/8/2023 1445                         Point: Body mechanics (Done)       Learning Progress Summary             Patient Acceptance, E,D, VU,DU by BR at 6/8/2023 1445   Family Acceptance, E,D, VU,DU by BR at 6/8/2023 1445                         Point: Precautions (Done)       Learning Progress Summary             Patient Acceptance, E,D, VU,DU by BR at 6/8/2023 1445   Family Acceptance, E,D, VU,DU by BR at 6/8/2023 1445                                         User Key       Initials Effective Dates Name Provider Type Discipline    BR 02/01/22 -  Evelyn Mancilla, FÁTIMA Physical Therapist PT                  PT Recommendation and Plan  Planned Therapy Interventions (PT): balance training, bed mobility training, gait training, home exercise program, patient/family education, neuromuscular re-education,  "transfer training, stair training, strengthening, stretching, ROM (range of motion)  Plan of Care Reviewed With: patient, sibling  Outcome Evaluation: Pt presents as an 83 y/o M admitted to [facility] on 6/7/23 with complaints of fever, chills, weakness, falls at home, nausea vomiting, diarrhea, and episodes of shortness of breath and choking. Pt has triggered sepsis and tick borne diseases are being ruled out. Hematology is following pt. CT head negative. CXR negative. CT abdomen shows inflammation. Pt has elevated liver transaminase and thrombocytopenia. Pt c/o feeling terrible with soreness in his legs to the touch. Pt A and O x 4. Gross BLE strength of 3-/5 and early fatigue with minimal exertion noted. At baseline, pt lives with spouse who has sternal precautions from recent CABG and is beginning cardiac rehab; spouse will be unable to physically assist pt at home. Pt is driving and independent in community without AD in [facility] with one step entry. Today, pt requiring min assist for transfers and 6 feet of lateral side-stepping with rolling walker. Pt requires more than one attempt to achieve standing and feels as if his legs will \"buckle\" in standing. Pt is far below his baseline for mobility. PT will follow pt. Occupational Therapy Eval is recommended- hospitalist aware. PT recommendation at this time is Skilled Nursing Facility.     Time Calculation:    PT Charges       Row Name 06/08/23 1445             Time Calculation    Start Time 1302  -BR      Stop Time 1335  -BR      Time Calculation (min) 33 min  -BR      PT Received On 06/08/23  -BR      PT - Next Appointment 06/09/23  -BR      PT Goal Re-Cert Due Date 06/22/23  -BR         Time Calculation- PT    Total Timed Code Minutes- PT 0 minute(s)  -BR                User Key  (r) = Recorded By, (t) = Taken By, (c) = Cosigned By      Initials Name Provider Type    Evelyn Logan PT Physical Therapist                  Therapy Charges for Today       Code " Description Service Date Service Provider Modifiers Qty    [id number] HC PT EVAL MOD COMPLEXITY 4 6/8/2023 Evelyn Mancilla, PT GP 1            PT G-Codes  Outcome Measure Options: AM-PAC 6 Clicks Basic Mobility (PT)  AM-PAC 6 Clicks Score (PT): 17  PT Discharge Summary  Anticipated Discharge Disposition (PT): skilled nursing facility    Evelyn Mancilla PT  6/8/2023      Electronically signed by Evelyn Mancilla PT at 06/08/23 1446       Occupational Therapy Notes (last 24 hours)  Notes from 06/08/23 1047 through 06/09/23 1047   No notes exist for this encounter.

## 2023-06-09 NOTE — THERAPY TREATMENT NOTE
"Subjective: Pt agreeable to therapeutic plan of care.    Objective:     Bed mobility - N/A or Not attempted. pt already up in the chair   Transfers - Min-A and with rolling walker sit to stand to sit 3 times.  Cues each time to scoot out and push up from sitting surface and to reach back to sitting surface for better eccentric controm  Ambulation - 25feet Min-A and with rolling walker    Vitals: WNL  Pt reported legs are less painful today    Pain: 3 VAS   Location: bilateral thighs  Intervention for pain: Repositioned, Increased Activity and Therapeutic Presence    Education: Provided education on the importance of mobility in the acute care setting, Transfer Training and Gait Training    Assessment: Mark Marie presents with functional mobility impairments which indicate the need for skilled intervention. Tolerating session today without incident.  Pt with less leg pain today and able to progress to gait training. Knees did not buckle during gait training.  Pt will need rehab at d/c to be able to recover his mobility.  Will continue to follow and progress as tolerated.     Plan/Recommendations:   Moderate Intensity Therapy recommended post-acute care. This is recommended as therapy feels the patient would require 3-4 days per week and wouldn't tolerate \"3 hour daily\" rehab intensity. SNF would be recommended. Pt requires no DME at discharge.     Pt desires Skilled Rehab placement at discharge. Pt cooperative; agreeable to therapeutic recommendations and plan of care.     Basic Mobility 6-click:  Rollin = Total, A lot = 2, A little = 3; 4 = None  Supine>Sit:   1 = Total, A lot = 2, A little = 3; 4 = None   Sit>Stand with arms:  1 = Total, A lot = 2, A little = 3; 4 = None  Bed>Chair:   1 = Total, A lot = 2, A little = 3; 4 = None  Ambulate in room:  1 = Total, A lot = 2, A little = 3; 4 = None  3-5 Steps with railin = Total, A lot = 2, A little = 3; 4 = None  Score: 17    Modified [place]: 4 = " Moderately severe disability (Unable to attend to own bodily needs without assistance, and unable to walk unassisted)     Post-Tx Position: Up in Chair, Alarms activated and Call light and personal items within reach  PPE: gloves and surgical mask

## 2023-06-09 NOTE — PLAN OF CARE
"Goal Outcome Evaluation:     Bed mobility - N/A or Not attempted. pt already up in the chair   Transfers - Min-A and with rolling walker sit to stand to sit 3 times.  Cues each time to scoot out and push up from sitting surface and to reach back to sitting surface for better eccentric controm  Ambulation - 25feet Min-A and with rolling walker    Moderate Intensity Therapy recommended post-acute care. This is recommended as therapy feels the patient would require 3-4 days per week and wouldn't tolerate \"3 hour daily\" rehab intensity. SNF would be recommended. Pt requires no DME at discharge.     Pt desires Skilled Rehab placement at discharge. Pt cooperative; agreeable to therapeutic recommendations and plan of care.                    "

## 2023-06-10 LAB
ALBUMIN SERPL-MCNC: 2.4 G/DL (ref 3.5–5.2)
ALBUMIN/GLOB SERPL: 0.9 G/DL
ALP SERPL-CCNC: 44 U/L (ref 39–117)
ALT SERPL W P-5'-P-CCNC: 49 U/L (ref 1–41)
ANION GAP SERPL CALCULATED.3IONS-SCNC: 8 MMOL/L (ref 5–15)
AST SERPL-CCNC: 144 U/L (ref 1–40)
BILIRUB SERPL-MCNC: 0.2 MG/DL (ref 0–1.2)
BLASTS NFR BLD MANUAL: 1 % (ref 0–0)
BUN SERPL-MCNC: 12 MG/DL (ref 8–23)
BUN/CREAT SERPL: 14.8 (ref 7–25)
CALCIUM SPEC-SCNC: 7.8 MG/DL (ref 8.6–10.5)
CHLORIDE SERPL-SCNC: 111 MMOL/L (ref 98–107)
CK SERPL-CCNC: 1505 U/L (ref 20–200)
CMV DNA SERPL NAA+PROBE-ACNC: NEGATIVE IU/ML
CMV DNA SERPL NAA+PROBE-LOG IU: NORMAL LOG10 IU/ML
CO2 SERPL-SCNC: 22 MMOL/L (ref 22–29)
CREAT SERPL-MCNC: 0.81 MG/DL (ref 0.76–1.27)
DEPRECATED RDW RBC AUTO: 43.3 FL (ref 37–54)
EBV DNA # SERPL NAA+PROBE: 2.94 LOG10 IU/ML
EBV DNA SERPL NAA+PROBE-ACNC: 873 IU/ML
EGFRCR SERPLBLD CKD-EPI 2021: 89.1 ML/MIN/1.73
EOSINOPHIL # BLD MANUAL: 0.05 10*3/MM3 (ref 0–0.4)
EOSINOPHIL NFR BLD MANUAL: 2 % (ref 0.3–6.2)
ERYTHROCYTE [DISTWIDTH] IN BLOOD BY AUTOMATED COUNT: 13.6 % (ref 12.3–15.4)
GLOBULIN UR ELPH-MCNC: 2.8 GM/DL
GLUCOSE BLDC GLUCOMTR-MCNC: 107 MG/DL (ref 70–105)
GLUCOSE BLDC GLUCOMTR-MCNC: 127 MG/DL (ref 70–105)
GLUCOSE BLDC GLUCOMTR-MCNC: 157 MG/DL (ref 70–105)
GLUCOSE BLDC GLUCOMTR-MCNC: 172 MG/DL (ref 70–105)
GLUCOSE SERPL-MCNC: 113 MG/DL (ref 65–99)
HCT VFR BLD AUTO: 37.4 % (ref 37.5–51)
HGB BLD-MCNC: 12.6 G/DL (ref 13–17.7)
LARGE PLATELETS: ABNORMAL
LYMPHOCYTES # BLD MANUAL: 0.35 10*3/MM3 (ref 0.7–3.1)
LYMPHOCYTES NFR BLD MANUAL: 6 % (ref 5–12)
MAGNESIUM SERPL-MCNC: 1.9 MG/DL (ref 1.6–2.4)
MCH RBC QN AUTO: 30.8 PG (ref 26.6–33)
MCHC RBC AUTO-ENTMCNC: 33.6 G/DL (ref 31.5–35.7)
MCV RBC AUTO: 91.5 FL (ref 79–97)
MONOCYTES # BLD: 0.16 10*3/MM3 (ref 0.1–0.9)
NEUTROPHILS # BLD AUTO: 2.03 10*3/MM3 (ref 1.7–7)
NEUTROPHILS NFR BLD MANUAL: 60 % (ref 42.7–76)
NEUTS BAND NFR BLD MANUAL: 15 % (ref 0–5)
PATHOLOGY REVIEW: YES
PLASMA CELL PREC NFR BLD MANUAL: 3 % (ref 0–0)
PLATELET # BLD AUTO: 86 10*3/MM3 (ref 140–450)
PMV BLD AUTO: 8.5 FL (ref 6–12)
POTASSIUM SERPL-SCNC: 3.8 MMOL/L (ref 3.5–5.2)
PROT SERPL-MCNC: 5.2 G/DL (ref 6–8.5)
RBC # BLD AUTO: 4.08 10*6/MM3 (ref 4.14–5.8)
RBC MORPH BLD: NORMAL
SCAN SLIDE: NORMAL
SMALL PLATELETS BLD QL SMEAR: ABNORMAL
SODIUM SERPL-SCNC: 141 MMOL/L (ref 136–145)
VARIANT LYMPHS NFR BLD MANUAL: 4 % (ref 0–5)
VARIANT LYMPHS NFR BLD MANUAL: 9 % (ref 19.6–45.3)
WBC MORPH BLD: NORMAL
WBC NRBC COR # BLD: 2.7 10*3/MM3 (ref 3.4–10.8)

## 2023-06-10 PROCEDURE — 99232 SBSQ HOSP IP/OBS MODERATE 35: CPT | Performed by: INTERNAL MEDICINE

## 2023-06-10 PROCEDURE — 82948 REAGENT STRIP/BLOOD GLUCOSE: CPT

## 2023-06-10 PROCEDURE — 85007 BL SMEAR W/DIFF WBC COUNT: CPT | Performed by: FAMILY MEDICINE

## 2023-06-10 PROCEDURE — 85025 COMPLETE CBC W/AUTO DIFF WBC: CPT | Performed by: FAMILY MEDICINE

## 2023-06-10 PROCEDURE — 83735 ASSAY OF MAGNESIUM: CPT | Performed by: FAMILY MEDICINE

## 2023-06-10 PROCEDURE — 63710000001 INSULIN LISPRO (HUMAN) PER 5 UNITS: Performed by: NURSE PRACTITIONER

## 2023-06-10 PROCEDURE — 80053 COMPREHEN METABOLIC PANEL: CPT | Performed by: FAMILY MEDICINE

## 2023-06-10 PROCEDURE — 25010000002 PIPERACILLIN SOD-TAZOBACTAM PER 1 G: Performed by: NURSE PRACTITIONER

## 2023-06-10 PROCEDURE — 82550 ASSAY OF CK (CPK): CPT | Performed by: FAMILY MEDICINE

## 2023-06-10 RX ORDER — PROCHLORPERAZINE EDISYLATE 5 MG/ML
5 INJECTION INTRAMUSCULAR; INTRAVENOUS EVERY 6 HOURS PRN
Status: DISCONTINUED | OUTPATIENT
Start: 2023-06-10 | End: 2023-06-12 | Stop reason: HOSPADM

## 2023-06-10 RX ORDER — CHLORPROMAZINE HYDROCHLORIDE 25 MG/1
25 TABLET, FILM COATED ORAL 3 TIMES DAILY
Status: DISCONTINUED | OUTPATIENT
Start: 2023-06-10 | End: 2023-06-12 | Stop reason: HOSPADM

## 2023-06-10 RX ADMIN — Medication 10 ML: at 20:07

## 2023-06-10 RX ADMIN — LORAZEPAM 1 MG: 1 TABLET ORAL at 10:42

## 2023-06-10 RX ADMIN — DOXYCYCLINE 100 MG: 100 INJECTION, POWDER, LYOPHILIZED, FOR SOLUTION INTRAVENOUS at 10:42

## 2023-06-10 RX ADMIN — INSULIN LISPRO 2 UNITS: 100 INJECTION, SOLUTION INTRAVENOUS; SUBCUTANEOUS at 21:40

## 2023-06-10 RX ADMIN — PIPERACILLIN SODIUM AND TAZOBACTAM SODIUM 3.38 G: 3; .375 INJECTION, POWDER, LYOPHILIZED, FOR SOLUTION INTRAVENOUS at 05:25

## 2023-06-10 RX ADMIN — CHLORPROMAZINE HYDROCHLORIDE 25 MG: 25 TABLET, FILM COATED ORAL at 20:07

## 2023-06-10 RX ADMIN — Medication 10 ML: at 10:43

## 2023-06-10 RX ADMIN — FAMOTIDINE 20 MG: 20 TABLET, FILM COATED ORAL at 18:35

## 2023-06-10 RX ADMIN — PIPERACILLIN SODIUM AND TAZOBACTAM SODIUM 3.38 G: 3; .375 INJECTION, POWDER, LYOPHILIZED, FOR SOLUTION INTRAVENOUS at 21:40

## 2023-06-10 RX ADMIN — DOXYCYCLINE 100 MG: 100 INJECTION, POWDER, LYOPHILIZED, FOR SOLUTION INTRAVENOUS at 20:05

## 2023-06-10 RX ADMIN — FAMOTIDINE 20 MG: 20 TABLET, FILM COATED ORAL at 10:42

## 2023-06-10 RX ADMIN — PIPERACILLIN SODIUM AND TAZOBACTAM SODIUM 3.38 G: 3; .375 INJECTION, POWDER, LYOPHILIZED, FOR SOLUTION INTRAVENOUS at 14:38

## 2023-06-10 RX ADMIN — INSULIN LISPRO 2 UNITS: 100 INJECTION, SOLUTION INTRAVENOUS; SUBCUTANEOUS at 18:35

## 2023-06-10 NOTE — NURSING NOTE
Patient found roaming in the hallway, wife had visited and bed alarm was turned of and it is unclear who turned it off. Patient had ripped arm band off and unhooked his IV. Patient back in bed and educated on need to ask for assistance when ambulating for his safety. Patient appears confused at this moment.

## 2023-06-10 NOTE — PROGRESS NOTES
[facility] Medicine Services   Daily Progress Note    Patient Name: Mark Marie  : 1940  MRN: [id number]  Primary Care Physician:  Diana Linares MD  Date of admission: 2023    Subjective      Admitted for febrile illness and thrombocytopenia in the setting of known tick exposure    No acute issues overnight.  Remains afebrile.  CK continues to downtrend.  Platelet count continues to slowly rise, currently at 86,000.  Still having diffuse muscle aches.  Remainder of his labs are fairly unchanged.  Hematology is following.  All of his tickborne illness labs are still pending of course.  Discussed the case with the bedside nursing staff. No other acute concerns.    Objective      Vitals:   Temp:  [97.7 °F (36.5 °C)-98.7 °F (37.1 °C)] 98.6 °F (37 °C)  Heart Rate:  [63-84] 63  Resp:  [16-22] 16  BP: ()/(51-74) 122/73    Physical Exam   GEN: Ill-appearing elderly gentleman, no acute distress  HEENT: NCAT, PERRLA, dry mucous membranes  NECK: Supple, midline trachea  CARD: RRR, no appreciable M/R/G, no peripheral edema  PULM: Fairly CTAB, occasional ronchus that clears with cough, non-distressed  ABD: soft, slight mid-abdominal tenderness to exam, normoactive bowel sounds throughout  SKIN: Warm, dry, slight pallor  NEURO: Grossly intact, non-focal exam  PSYCH: Pleasant     Result Review    I have personally reviewed the results from the time of this admission to 6/10/2023 08:14 EDT and agree with these findings:  [x]  Laboratory  [x]  Microbiology  [x]  Radiology  [x]  EKG/Telemetry   [x]  Cardiology/Vascular   []  Pathology  []  Old records  []  Other:    CT Abdomen Pelvis Without Contrast    Result Date: 2023  CT ABDOMEN PELVIS WO CONTRAST Date of Exam: 2023 7:45 PM EDT Indication: Fever, history of recent nausea and vomiting. Right-sided abdominal pain. Comparison: None available. Technique: Axial CT images were obtained of the abdomen and pelvis without the administration of  contrast. Sagittal and coronal reconstructions were performed.  Automated exposure control and iterative reconstruction methods were used. Findings: Lower Thorax: There is mild linear opacity lung bases, scarring versus subsegmental atelectasis. Peritoneum: No free air or free fluid. There is hernia mesh material in the mid abdomen. There are sutures in the low pelvis which may be related to previous hernia repair as well. Appendix: Appendix is not well seen. No inflammatory findings in the right lower quadrant.. Kidneys: No hydronephrosis. No renal calculi. No focal renal lesions. Ureters: No obstructing calculi or mass. Urinary bladder: Urinary bladder has normal appearance on CT given degree of distention. Liver: No focal hepatic lesions. Hepatomegaly. Gallbladder and bile ducts: No gallstones. No bile duct dilatation. Gallbladder has normal appearance. Spleen: Spleen is normal size.  No focal splenic lesions. Adrenal glands: Unremarkable. Pancreas: No focal masses.  No pancreatic duct dilation. No surrounding inflammation. Abdominal aorta and Vascular Structures: No aneurysmal dilation. Moderate atherosclerotic disease. Stomach and Bowel:  Small hiatal hernia. There is some mildly dilated small bowel loops in the left upper quadrant with questionable wall thickening. The measure up to 2.9 cm. No transition point is seen. No significant bowel wall thickening.  Ligament of [place] has normal anatomic position. There are numerous diverticuli in the sigmoid: And distal descending colon. There are less numerous seen in the transverse and ascending colon. None are acutely inflamed. Reproductive Organs: There are fiducial markers within the prostate gland. Lymph nodes: No pathologically enlarged lymph nodes. Soft tissues: Unremarkable. Osseous structures: No aggressive focal lytic or sclerotic osseous lesions. Evaluation of bowel and solid organs is limited without contrast administration.     Impression: 1.There are  some mildly dilated loops of small bowel with mild wall thickening left upper quadrant. Question enteritis. 2.Diverticulosis without diverticulitis. 3.Small hiatal hernia. 4.Hepatomegaly. Electronically Signed: Norma Hathaway  6/7/2023 8:05 PM EDT  Workstation ID: [id number]       Assessment & Plan      doxycycline, 100 mg, Intravenous, Q12H  famotidine, 20 mg, Oral, BID AC  insulin lispro, 2-7 Units, Subcutaneous, 4x Daily AC & at Bedtime  LORazepam, 1 mg, Oral, BID  piperacillin-tazobactam, 3.375 g, Intravenous, Once  piperacillin-tazobactam, 3.375 g, Intravenous, Q8H  sodium chloride, 10 mL, Intravenous, Q12H       sodium chloride, 100 mL/hr, Last Rate: 100 mL/hr (06/09/23 2031)         Active Hospital Problems    Diagnosis  POA   • **Sepsis, due to unspecified organism, unspecified whether acute organ dysfunction present [A41.9]  Yes   • Non-traumatic rhabdomyolysis [M62.82]  Yes   • Febrile illness [R50.9]  Yes   • Thrombocytopenia [D69.6]  Yes   • Elevated liver transaminase level [R74.01]  Yes   • Dysphagia [R13.10]  Yes   • Acute kidney injury [N17.9]  Yes   • Confusion [R41.0]  Yes   • History of prostate cancer [Z85.46]  Not Applicable   • Memory change [R41.3]  Yes   • Hyperlipidemia [E78.5]  Yes   • Hypertension [I10]  Yes   • Hypothyroidism [E03.9]  Yes      Resolved Hospital Problems   No resolved problems to display.       Plan:   Febrile illness with complications of elevated liver transaminase and thrombocytopenia.    Atraumatic rhabdomyolysis.  Epigastric pain  -Differentials include tickborne illness versus viral illness such as cytomegalovirus or Shobha-Barr virus  -Patient did end up recalling taking one or two ticks off a couple of weeks ago.   -COVID 19 screen and respiratory viral DNA panel are negative  -Chest x-ray was unremarkable  -Blood cultures are negative thus far  -CT abdomen pelvis showing possible enteritis. I don't this is the primary source of all of his presenting symptoms/findings  but certainly worth watching.  -Continue IV Zosyn in the interim  -IV doxycycline to cover for tickborne illness  -Ehrlichia PCR and antibody panel  -[place] mounted spotted fever antibody panel  -Lyme antibody panel negative  -CMV PCR and IgM negative.  -Shobha-Barr virus PCR and antibody panel suggest prior infection but nothing acute.   -Serum CK normalizing  -Monitor renal function and electrolytes daily      Episodic periods of dysphagia with shortness of breath that last several seconds to several minutes  -Modified diet as per ST     Acute kidney injury  -Possible dehydration due to decreased oral intake  -Gentle hydration     Confusion at at home and on admission, along with weakness and falls  -Resolved  -The head CT did not show any acute findings     History of early dementia  -Continue donepezil when patient has been cleared by speech therapy     Type 2 diabetes  -Continue metformin   -Sliding scale insulin     Hyperlipidemia   -Continue simvastatin     Hypertension  -Continue losartan     Hypothyroidism  -Continue levothyroxine      History of prostate cancer  -Continue tamsulosin     I believe some of his epigastric pain episodes are due to some anxiety.  He may also be having some esophageal spasm or sliding hiatal hernia concerns.  Ativan seems to be helping.    PT/OT    DVT prophylaxis:  Mechanical DVT prophylaxis orders are present.    CODE STATUS:    Level Of Support Discussed With: Patient  Code Status (Patient has no pulse and is not breathing): CPR (Attempt to Resuscitate)  Medical Interventions (Patient has pulse or is breathing): Full Support  Release to patient: Routine Release    Disposition: Referral for skilled rehab at discharge.  He needs 3 midnights, and he has a bed available at [facility] on Monday.      Electronically signed by Thang Clayton MD, 06/10/23, 08:14 EDT.  [facility] Hospitalist Team

## 2023-06-10 NOTE — PROGRESS NOTES
Hematology/Oncology Inpatient Progress Note    PATIENT NAME: Mark Marie  : 1940  MRN: [id number]    CHIEF COMPLAINT: Fever    HISTORY OF PRESENT ILLNESS:      Mark Marie is a 82 y.o. male who presented to [facility] on 2023 at the direction of his primary care physician with complaints of fever, chills, weakness and falls.  Past medical history of type 2 diabetes mellitus, hypertension, hyperlipidemia, hypothyroidism, dementia prostate cancer in , squamous cell carcinoma of skin.  Patient reported he had been feeling poorly for over a week.  In addition to fever and chills he also reported nausea and diarrhea.  Intermittent episodes of dysphagia, difficulty breathing, hiccups and congestion.  Denied any abdominal pain or urinary symptoms.     Evaluation in the ED: WBC 3.90, hemoglobin 15 g/dL, MCV 91.8, platelets 43,000, bands 22%, lymphocytes 5%, plasma cells 1%.  BUN 27, creatinine 1.29, , ALT 53, negative respiratory panel, chest x-ray showing no acute cardiopulmonary disease, blood cultures drawn and pending, CT of the head without contrast showing no acute intracranial abnormality; age-appropriate volume loss; mild to moderate amount of low-density periventricular and subcortical white matter most commonly seen with chronic small vessel ischemic change.  UA positive for large amount of blood, trace leukocytes CT of the abdomen and pelvis without contrast showed possible enteritis, diverticulosis without diverticulitis, small hiatal hernia, hepatomegaly.  PT 12.0, INR 1.13.     23  Hematology/Oncology was consulted for acute thrombocytopenia.  Patient had platelets presented with platelet count of 43,000 with bandemia.  He also has had a febrile illness no obvious source at this time..  Patient has a history of underlying mild dementia     He/She  has a past medical history of Cataract (removed 16 years ago), Diabetes mellitus, HL (hearing loss) (have worn hearing  aids for 16 years), Hyperlipidemia, Hypertension, Hypothyroidism, Joint pain, Low back pain, Prostate cancer (2011), and Squamous cell carcinoma of skin.     PCP: Diana Linares MD    Subjective      Patient continues to improve clinically.  He does not have any new issues today    ROS:    Review of Systems   Constitutional: Positive for fatigue. Negative for chills and fever.   HENT: Negative for congestion, drooling, ear discharge, rhinorrhea, sinus pressure and tinnitus.    Eyes: Negative for photophobia, pain and discharge.   Respiratory: Negative for apnea, choking and stridor.    Cardiovascular: Negative for palpitations.   Gastrointestinal: Negative for abdominal distention, abdominal pain and anal bleeding.   Endocrine: Negative for polydipsia and polyphagia.   Genitourinary: Negative for decreased urine volume, flank pain and genital sores.   Musculoskeletal: Negative for gait problem, neck pain and neck stiffness.   Skin: Negative for color change, rash and wound.   Neurological: Positive for weakness. Negative for tremors, seizures, syncope, facial asymmetry and speech difficulty.   Hematological: Negative for adenopathy.   Psychiatric/Behavioral: Negative for agitation, confusion, hallucinations and self-injury. The patient is not hyperactive.         MEDICATIONS:      Scheduled Meds:  doxycycline, 100 mg, Intravenous, Q12H  famotidine, 20 mg, Oral, BID AC  insulin lispro, 2-7 Units, Subcutaneous, 4x Daily AC & at Bedtime  LORazepam, 1 mg, Oral, BID  piperacillin-tazobactam, 3.375 g, Intravenous, Once  piperacillin-tazobactam, 3.375 g, Intravenous, Q8H  sodium chloride, 10 mL, Intravenous, Q12H       Continuous Infusions:  sodium chloride, 100 mL/hr, Last Rate: 100 mL/hr (06/09/23 2031)       PRN Meds:    acetaminophen    Calcium Replacement - Follow Nurse / BPA Driven Protocol    dextrose    dextrose    glucagon (human recombinant)    Magnesium Standard Dose Replacement - Follow Nurse / BPA Driven  "Protocol    ondansetron    Phosphorus Replacement - Follow Nurse / BPA Driven Protocol    polyethylene glycol    Potassium Replacement - Follow Nurse / BPA Driven Protocol    sodium chloride    sodium chloride    sodium chloride     ALLERGIES:    Allergies   Allergen Reactions    Ramipril Swelling     Leg swelling       Objective    VITALS:   /73 (BP Location: Right arm, Patient Position: Lying)   Pulse 63   Temp 98.6 °F (37 °C) (Oral)   Resp 16   Ht 162.6 cm (64\")   Wt 85.2 kg (187 lb 13.3 oz)   SpO2 97%   BMI 32.24 kg/m²     PHYSICAL EXAM:     Physical Exam  Vitals and nursing note reviewed.   Constitutional:       General: He is not in acute distress.     Appearance: He is not diaphoretic.   HENT:      Head: Normocephalic and atraumatic.   Eyes:      General: No scleral icterus.        Right eye: No discharge.         Left eye: No discharge.      Conjunctiva/sclera: Conjunctivae normal.   Neck:      Thyroid: No thyromegaly.   Cardiovascular:      Rate and Rhythm: Normal rate and regular rhythm.      Heart sounds: Normal heart sounds.     No friction rub. No gallop.   Pulmonary:      Effort: Pulmonary effort is normal. No respiratory distress.      Breath sounds: No stridor. No wheezing.   Abdominal:      General: Bowel sounds are normal.      Palpations: Abdomen is soft. There is no mass.      Tenderness: There is no abdominal tenderness. There is no guarding or rebound.   Musculoskeletal:         General: No tenderness. Normal range of motion.      Cervical back: Normal range of motion and neck supple.   Lymphadenopathy:      Cervical: No cervical adenopathy.   Skin:     General: Skin is warm.      Findings: No erythema or rash.   Neurological:      Mental Status: He is alert and oriented to person, place, and time.      Motor: No abnormal muscle tone.   Psychiatric:         Behavior: Behavior normal.       I have reexamined the patient and the results are consistent with the previously documented " exam. Hallie Devi Valencia MD      RECENT LABS:  Lab Results (last 24 hours)       Procedure Component Value Units Date/Time    POC Glucose Once [[id number]]  (Abnormal) Collected: 06/10/23 0724    Specimen: Blood Updated: 06/10/23 0725     Glucose 107 mg/dL      Comment: Serial Number: [id number]:  [id number]       Path Consult Reflex [[id number]] Collected: 06/10/23 0531    Specimen: Blood Updated: 06/10/23 0712     Pathology Review Yes    CBC & Differential [[id number]]  (Abnormal) Collected: 06/10/23 0531    Specimen: Blood Updated: 06/10/23 0712    Narrative:      The following orders were created for panel order CBC & Differential.  Procedure                               Abnormality         Status                     ---------                               -----------         ------                     CBC Auto Differential[[id number]]        Abnormal            Final result               Scan Slide[[id number]]                                       Final result                 Please view results for these tests on the individual orders.    CBC Auto Differential [[id number]]  (Abnormal) Collected: 06/10/23 0531    Specimen: Blood Updated: 06/10/23 0712     WBC 2.70 10*3/mm3      RBC 4.08 10*6/mm3      Hemoglobin 12.6 g/dL      Hematocrit 37.4 %      MCV 91.5 fL      MCH 30.8 pg      MCHC 33.6 g/dL      RDW 13.6 %      RDW-SD 43.3 fl      MPV 8.5 fL      Platelets 86 10*3/mm3     Narrative:      The previously reported component NRBC is no longer being reported. Previous result was 0.2 /100 WBC (Reference Range: 0.0-0.2 /100 WBC) on 6/10/2023 at 0624 EDT.    Scan Slide [[id number]] Collected: 06/10/23 0531    Specimen: Blood Updated: 06/10/23 0712     Scan Slide --     Comment: See Manual Differential Results       Manual Differential [[id number]]  (Abnormal) Collected: 06/10/23 0531    Specimen: Blood Updated: 06/10/23 0712     Neutrophil % 60.0 %      Lymphocyte % 9.0 %      Monocyte % 6.0 %      Eosinophil  % 2.0 %      Bands %  15.0 %      Atypical Lymphocyte % 4.0 %      Blasts % 1.0 %      Plasma Cells % 3.0 %      Neutrophils Absolute 2.03 10*3/mm3      Lymphocytes Absolute 0.35 10*3/mm3      Monocytes Absolute 0.16 10*3/mm3      Eosinophils Absolute 0.05 10*3/mm3      RBC Morphology Normal     WBC Morphology Normal     Platelet Estimate Decreased     Large Platelets Slight/1+    Magnesium [[id number]]  (Normal) Collected: 06/10/23 0531    Specimen: Blood Updated: 06/10/23 0634     Magnesium 1.9 mg/dL     Comprehensive Metabolic Panel [[id number]]  (Abnormal) Collected: 06/10/23 0531    Specimen: Blood Updated: 06/10/23 0634     Glucose 113 mg/dL      BUN 12 mg/dL      Creatinine 0.81 mg/dL      Sodium 141 mmol/L      Potassium 3.8 mmol/L      Chloride 111 mmol/L      CO2 22.0 mmol/L      Calcium 7.8 mg/dL      Total Protein 5.2 g/dL      Albumin 2.4 g/dL      ALT (SGPT) 49 U/L      AST (SGOT) 144 U/L      Alkaline Phosphatase 44 U/L      Total Bilirubin 0.2 mg/dL      Globulin 2.8 gm/dL      A/G Ratio 0.9 g/dL      BUN/Creatinine Ratio 14.8     Anion Gap 8.0 mmol/L      eGFR 89.1 mL/min/1.73     Narrative:      GFR Normal >60  Chronic Kidney Disease <60  Kidney Failure <15    The GFR formula is only valid for adults with stable renal function between ages 18 and 70.    CK [[id number]]  (Abnormal) Collected: 06/10/23 0531    Specimen: Blood Updated: 06/10/23 0634     Creatine Kinase 1,505 U/L     POC Glucose Once [[id number]]  (Abnormal) Collected: 06/09/23 2047    Specimen: Blood Updated: 06/09/23 2050     Glucose 141 mg/dL      Comment: Serial Number: [id number]:  [id number]       Blood Culture - Blood, Arm, Right [[id number]]  (Normal) Collected: 06/07/23 1639    Specimen: Blood from Arm, Right Updated: 06/09/23 1645     Blood Culture No growth at 2 days    Narrative:      Less than seven (7) mL's of blood was collected.  Insufficient quantity may yield false negative results.    POC Glucose Once [[id number]]   (Abnormal) Collected: 06/09/23 1636    Specimen: Blood Updated: 06/09/23 1639     Glucose 179 mg/dL      Comment: Serial Number: [id number]:  [id number]       Immunofixation, Serum [[id number]]  (Abnormal) Collected: 06/08/23 1219    Specimen: Blood Updated: 06/09/23 1612     Immunofixation Result, Serum Comment     Comment: Immunofixation shows IgM monoclonal protein with lambda light chain  specificity.        IgG 814 mg/dL      IgA 417 mg/dL      IgM 181 mg/dL     Narrative:      Performed at:  [address]  [id number]  : David Lee PhD, Phone:  [phone]    Blood Culture - Blood, Arm, Right [[id number]]  (Normal) Collected: 06/07/23 1553    Specimen: Blood from Arm, Right Updated: 06/09/23 1601     Blood Culture No growth at 2 days    Protein Electrophoresis, Total [[id number]]  (Abnormal) Collected: 06/08/23 1218    Specimen: Blood Updated: 06/09/23 1510     Total Protein 5.0 g/dL      Albumin 2.4 g/dL      Alpha-1-Globulin 0.3 g/dL      Alpha-2-Globulin 0.7 g/dL      Beta Globulin 0.7 g/dL      Gamma Globulin 0.9 g/dL      M-Rick Not Observed g/dL      Globulin 2.6 g/dL      A/G Ratio 0.9     Please note Comment     Comment: Protein electrophoresis scan will follow via computer, mail, or   delivery.       Narrative:      Performed at:  [address] [place] [address]  [id number]  : David Lee PhD, Phone:  [phone]    Pathology Consultation [[id number]] Collected: 06/08/23 1218    Specimen: Blood, Venous Line Updated: 06/09/23 1442     Final Diagnosis --     Pancytopenia  No definite blasts identified       Case Report --     Surgical Pathology Report                         Case: [id number]                                  Authorizing Provider:  Cleo Gabriel APRN Collected:           06/08/2023 12:18 PM          Ordering Location:     [address]    Received:            06/09/2023 07:30  AM                                 MEDICAL INPATIENT                                                            Pathologist:           Gennaro Haywood MD                                                            Specimen:    Blood, Venous Line                                                                         Lyme Disease Total Antibody With Reflex to Immunoassay [[id number]] Collected: 06/07/23 2246    Specimen: Blood Updated: 06/09/23 1411     Lyme Total Antibody EIA Negative     Comment: Lyme antibodies not detected. Reflex testing is not indicated.  No laboratory evidence of infection with B. burgdorferi (Lyme disease).  Negative results may occur in patients recently infected (less than  or equal to 14 days) with B. burgdorferi.  If recent infection is  suspected, repeat testing on a new sample collected in 7 to 14 days is  recommended.       Narrative:      Performed at:  [address] [place] [address]  [id number]  : David Lee PhD, Phone:  [phone]    POC Glucose Once [[id number]]  (Abnormal) Collected: 06/09/23 1221    Specimen: Blood Updated: 06/09/23 1223     Glucose 116 mg/dL      Comment: Serial Number: [id number]:  [id number]               PENDING RESULTS:     IMAGING REVIEWED:  No radiology results for the last day    Assessment & Plan   ASSESSMENT:  Acute thrombocytopenia: Review of chart shows platelet count of 264,000 in February 2023.  Patient with platelet count of 43,000 upon presentation and now currently improved 46,000.  Normal hemoglobin and white blood cell count upon presentation but currently decreased to 2.10 and 12.0 g/dL.  No known heparin exposure.  Patient with elevated LFTs and hepatomegaly on the CT.  Febrile illness of presentation.  Additional work-up ordered to evaluate for hemolysis, nutritional deficiency, hematological malignancy.  Thus far no sign of nutritional deficiency or hemolysis.  Has a very elevated LDH and a low  reticulocyte count suggesting bone marrow suppression.  He is also being evaluated for viral and tickborne illnesses.  Peripheral smear negative for blasts.  No M spike noted on SPEP.  Counts are beginning to improve  Febrile illness: On IV doxycycline.  Blood cultures pending.  EBV/CMV not consistent with active infection.  Tick panel in progress but negative for Lyme.  Managed by primary  Acute kidney injury: On IV hydration.  Creatinine has improved  Confusion and falls at home: CT of the head with no acute findings.  This is slowly improving  Multiple chronic conditions: Type 2 diabetes mellitus, hyperlipidemia, hypertension, hypothyroidism.  History of prostate cancer in 2011, history of squamous cell carcinoma of the skin.    PLANS:    Overall clinical improvement  Thrombocytopenia work-up in progress.  Counts are beginning to improve  Monitor CBC daily and transfuse for platelets less than 20,000 or signs of active bleeding.  Daily CBCs        Thank you for this consult. We will be happy to follow along with you.  We will continue to follow along with you      Electronically signed by Hallie Valencia MD, 06/11/23, 11:07 AM EDT.

## 2023-06-10 NOTE — PLAN OF CARE
Goal Outcome Evaluation:  Plan of Care Reviewed With: patient        Progress: improving  Outcome Evaluation: Pt is awaiting thick panel result. Hiccup noted off and on. Otherwise , pt rested during this shift, will cont to monitor.

## 2023-06-10 NOTE — CONSULTS
"Nutrition Services    Patient Name: Mark Marie  YOB: 1940  MRN: [id number]  Admission date: 6/7/2023    Comment:  Continue current diet and encourage good PO intake.     PPE Documentation        PPE Worn By Provider mask   PPE Worn By Patient  N/A     CLINICAL NUTRITION ASSESSMENT      Reason for Assessment 6/10: MST: 3      H&P      Past Medical History:   Diagnosis Date    Cataract removed 16 years ago    Diabetes mellitus     HL (hearing loss) have worn hearing aids for 16 years    Hyperlipidemia     Hypertension     Hypothyroidism     Joint pain     Low back pain     Non-traumatic rhabdomyolysis 06/09/2023    Prostate cancer 2011    Squamous cell carcinoma of skin     right temple       Past Surgical History:   Procedure Laterality Date    HEMORROIDECTOMY      HERNIA REPAIR      inguinal and umbilical    KNEE SURGERY Bilateral     trimmed cartilage    VASECTOMY  50 years ago        Current Problems   Febrile illness with complications of elevated liver transaminase and thrombocytopenia  -possible tickborne illness versus viral illness  -abx    Episodic periods of dysphagia with SOB that last several seconds to several minutes  -SLP cleared pt for regular/thin liquids    MICHAEL    Confusion at home and on admission along with weakness and falls  -resolved    Hx of early dementia     Encounter Information        Trending Narrative     6/10: Pt admitted to [facility] with sepsis. SLP cleared pt for regular/thin liquid diet.Pt sleeping at time of RD visit. RD spoke with pt's family at time of visit. Pt's family reported no recent wt changes and stated pt eats a variety of fruits and vegetables from their garden + variety of meats. Pt's family reported pt does not drink ONS at home. Unable to complete NFPE.      Anthropometrics        Current Height, Weight Height: 162.6 cm (64\")  Weight: 85.2 kg (187 lb 13.3 oz) (06/10/23 0328)       Ideal Body Weight (IBW) 130#   Usual Body Weight (UBW) unknown     "   Trending Weight Hx     This admission: 6/10: 187# - scale              PTA: 6.5% wt loss x 4 months    Wt Readings from Last 30 Encounters:   06/10/23 0328 85.2 kg (187 lb 13.3 oz)   06/08/23 0526 85.2 kg (187 lb 13.3 oz)   06/07/23 1456 90.7 kg (200 lb)   02/14/23 1332 90.8 kg (200 lb 1.6 oz)   07/13/22 1005 83.5 kg (184 lb)   06/01/22 1310 83.9 kg (185 lb)   04/07/22 0934 83.9 kg (185 lb)   03/16/22 1037 83.9 kg (185 lb)   02/22/22 1438 83.9 kg (185 lb)   02/07/22 1443 83.9 kg (185 lb)   01/19/22 1122 85.3 kg (188 lb)   09/21/21 1004 85.9 kg (189 lb 6.4 oz)   03/19/21 1021 87.5 kg (193 lb)   07/27/20 0859 84.8 kg (187 lb)   09/03/19 1525 88 kg (194 lb)   02/18/19 1014 90.7 kg (200 lb)   02/13/19 1242 92.1 kg (203 lb)   10/23/18 1251 90.3 kg (199 lb)   09/26/18 1410 89.8 kg (198 lb)   01/08/18 1122 91.6 kg (202 lb)   11/21/17 1258 93.4 kg (206 lb)   09/05/17 1351 90.7 kg (200 lb)   08/29/17 0906 90.7 kg (200 lb)   06/09/17 1027 90.7 kg (200 lb)   08/17/16 0907 88.9 kg (196 lb)      BMI kg/m2 Body mass index is 32.24 kg/m².       Labs        Pertinent Labs Reviewed, management per attending.    Results from last 7 days   Lab Units 06/10/23  0531 06/09/23  0203 06/07/23  2246   SODIUM mmol/L 141 135* 138   POTASSIUM mmol/L 3.8 3.2* 3.6   CHLORIDE mmol/L 111* 108* 106   CO2 mmol/L 22.0 19.0* 22.0   BUN mg/dL 12 13 21   CREATININE mg/dL 0.81 0.88 1.01   CALCIUM mg/dL 7.8* 7.1* 7.2*   BILIRUBIN mg/dL 0.2 0.3 0.4   ALK PHOS U/L 44 42 43   ALT (SGPT) U/L 49* 48* 49*   AST (SGOT) U/L 144* 195* 220*   GLUCOSE mg/dL 113* 146* 108*     Results from last 7 days   Lab Units 06/10/23  0531 06/09/23  0203   MAGNESIUM mg/dL 1.9 2.0   HEMOGLOBIN g/dL 12.6* 12.0*   HEMATOCRIT % 37.4* 35.0*     COVID19   Date Value Ref Range Status   06/07/2023 Not Detected Not Detected - Ref. Range Final     Lab Results   Component Value Date    HGBA1C 5.6 02/08/2023        Medications    Scheduled Medications doxycycline, 100 mg, Intravenous,  Q12H  famotidine, 20 mg, Oral, BID AC  insulin lispro, 2-7 Units, Subcutaneous, 4x Daily AC & at Bedtime  LORazepam, 1 mg, Oral, BID  piperacillin-tazobactam, 3.375 g, Intravenous, Once  piperacillin-tazobactam, 3.375 g, Intravenous, Q8H  sodium chloride, 10 mL, Intravenous, Q12H        Infusions sodium chloride, 100 mL/hr, Last Rate: 100 mL/hr (06/09/23 2031)        PRN Medications   acetaminophen    Calcium Replacement - Follow Nurse / BPA Driven Protocol    dextrose    dextrose    glucagon (human recombinant)    Magnesium Standard Dose Replacement - Follow Nurse / BPA Driven Protocol    ondansetron    Phosphorus Replacement - Follow Nurse / BPA Driven Protocol    polyethylene glycol    Potassium Replacement - Follow Nurse / BPA Driven Protocol    sodium chloride    sodium chloride    sodium chloride     Physical Findings        Trending Physical   Appearance, NFPE 6/10: HUNTER   --  Edema  1+ hands     Bowel Function Last documented BM on 6/9     Tubes none     Chewing/Swallowing Regular/thin liquids per SLP     Skin Intact      --  Current Nutrition Orders & Evaluation of Intake       Oral Nutrition     Food Allergies NKFA   Current PO Diet Diet: Regular/House Diet; Texture: Regular Texture (IDDSI 7); Fluid Consistency: Thin (IDDSI 0)   Supplement none   PO Evaluation     Trending % PO Intake 6/10: 100% at last 2 meals, 25-50% prior   --  Nutritional Risk Screening        NRS-2002 Score          Nutrition Diagnosis         Nutrition Dx Problem 1 No acute nutrition dx at this time. RD to follow up per protocol.      Nutrition Dx Problem 2        Intervention Goal         Intervention Goal(s) PO intake > 75%     Nutrition Intervention        RD Action Continue current diet     Nutrition Prescription          Diet Prescription regular   Supplement Prescription None, family declined   --  Monitor/Evaluation        Monitor Per protocol, I&O, PO intake, Pertinent labs, Weight, Swallow function       Electronically signed  by:  Madina Kapadia RD  06/10/23 09:03 EDT

## 2023-06-11 LAB
ALBUMIN SERPL-MCNC: 2.4 G/DL (ref 3.5–5.2)
ALBUMIN/GLOB SERPL: 0.9 G/DL
ALP SERPL-CCNC: 64 U/L (ref 39–117)
ALT SERPL W P-5'-P-CCNC: 73 U/L (ref 1–41)
ANION GAP SERPL CALCULATED.3IONS-SCNC: 8 MMOL/L (ref 5–15)
AST SERPL-CCNC: 183 U/L (ref 1–40)
BILIRUB SERPL-MCNC: 0.3 MG/DL (ref 0–1.2)
BLASTS NFR BLD MANUAL: 1 % (ref 0–0)
BUN SERPL-MCNC: 9 MG/DL (ref 8–23)
BUN/CREAT SERPL: 10 (ref 7–25)
CALCIUM SPEC-SCNC: 8 MG/DL (ref 8.6–10.5)
CHLORIDE SERPL-SCNC: 113 MMOL/L (ref 98–107)
CO2 SERPL-SCNC: 23 MMOL/L (ref 22–29)
CREAT SERPL-MCNC: 0.9 MG/DL (ref 0.76–1.27)
DEPRECATED RDW RBC AUTO: 45.1 FL (ref 37–54)
EGFRCR SERPLBLD CKD-EPI 2021: 86.3 ML/MIN/1.73
EOSINOPHIL # BLD MANUAL: 0.08 10*3/MM3 (ref 0–0.4)
EOSINOPHIL NFR BLD MANUAL: 2 % (ref 0.3–6.2)
ERYTHROCYTE [DISTWIDTH] IN BLOOD BY AUTOMATED COUNT: 13.4 % (ref 12.3–15.4)
GLOBULIN UR ELPH-MCNC: 2.7 GM/DL
GLUCOSE BLDC GLUCOMTR-MCNC: 151 MG/DL (ref 70–105)
GLUCOSE BLDC GLUCOMTR-MCNC: 191 MG/DL (ref 70–105)
GLUCOSE BLDC GLUCOMTR-MCNC: 209 MG/DL (ref 70–105)
GLUCOSE BLDC GLUCOMTR-MCNC: 97 MG/DL (ref 70–105)
GLUCOSE SERPL-MCNC: 111 MG/DL (ref 65–99)
HCT VFR BLD AUTO: 35.2 % (ref 37.5–51)
HGB BLD-MCNC: 11.9 G/DL (ref 13–17.7)
LARGE PLATELETS: ABNORMAL
LYMPHOCYTES # BLD MANUAL: 0.8 10*3/MM3 (ref 0.7–3.1)
LYMPHOCYTES NFR BLD MANUAL: 8 % (ref 5–12)
MCH RBC QN AUTO: 30.9 PG (ref 26.6–33)
MCHC RBC AUTO-ENTMCNC: 33.8 G/DL (ref 31.5–35.7)
MCV RBC AUTO: 91.2 FL (ref 79–97)
METAMYELOCYTES NFR BLD MANUAL: 7 % (ref 0–0)
MONOCYTES # BLD: 0.34 10*3/MM3 (ref 0.1–0.9)
NEUTROPHILS # BLD AUTO: 2.52 10*3/MM3 (ref 1.7–7)
NEUTROPHILS NFR BLD MANUAL: 52 % (ref 42.7–76)
NEUTS BAND NFR BLD MANUAL: 8 % (ref 0–5)
PLASMA CELL PREC NFR BLD MANUAL: 1 % (ref 0–0)
PLATELET # BLD AUTO: 142 10*3/MM3 (ref 140–450)
PMV BLD AUTO: 7.8 FL (ref 6–12)
POTASSIUM SERPL-SCNC: 3.7 MMOL/L (ref 3.5–5.2)
PROMYELOCYTES NFR BLD MANUAL: 2 % (ref 0–0)
PROT SERPL-MCNC: 5.1 G/DL (ref 6–8.5)
RBC # BLD AUTO: 3.86 10*6/MM3 (ref 4.14–5.8)
RBC MORPH BLD: NORMAL
SCAN SLIDE: NORMAL
SODIUM SERPL-SCNC: 144 MMOL/L (ref 136–145)
VARIANT LYMPHS NFR BLD MANUAL: 16 % (ref 19.6–45.3)
VARIANT LYMPHS NFR BLD MANUAL: 3 % (ref 0–5)
WBC MORPH BLD: NORMAL
WBC NRBC COR # BLD: 4.2 10*3/MM3 (ref 3.4–10.8)

## 2023-06-11 PROCEDURE — 80053 COMPREHEN METABOLIC PANEL: CPT | Performed by: FAMILY MEDICINE

## 2023-06-11 PROCEDURE — 63710000001 INSULIN LISPRO (HUMAN) PER 5 UNITS: Performed by: NURSE PRACTITIONER

## 2023-06-11 PROCEDURE — 85025 COMPLETE CBC W/AUTO DIFF WBC: CPT | Performed by: FAMILY MEDICINE

## 2023-06-11 PROCEDURE — 99231 SBSQ HOSP IP/OBS SF/LOW 25: CPT | Performed by: INTERNAL MEDICINE

## 2023-06-11 PROCEDURE — 97116 GAIT TRAINING THERAPY: CPT

## 2023-06-11 PROCEDURE — 97535 SELF CARE MNGMENT TRAINING: CPT

## 2023-06-11 PROCEDURE — 82948 REAGENT STRIP/BLOOD GLUCOSE: CPT

## 2023-06-11 PROCEDURE — 85007 BL SMEAR W/DIFF WBC COUNT: CPT | Performed by: FAMILY MEDICINE

## 2023-06-11 PROCEDURE — 25010000002 PIPERACILLIN SOD-TAZOBACTAM PER 1 G: Performed by: NURSE PRACTITIONER

## 2023-06-11 PROCEDURE — 97530 THERAPEUTIC ACTIVITIES: CPT

## 2023-06-11 RX ORDER — TAMSULOSIN HYDROCHLORIDE 0.4 MG/1
0.4 CAPSULE ORAL DAILY
Status: DISCONTINUED | OUTPATIENT
Start: 2023-06-11 | End: 2023-06-12 | Stop reason: HOSPADM

## 2023-06-11 RX ORDER — LEVOTHYROXINE SODIUM 0.03 MG/1
25 TABLET ORAL
Status: DISCONTINUED | OUTPATIENT
Start: 2023-06-12 | End: 2023-06-12 | Stop reason: HOSPADM

## 2023-06-11 RX ORDER — DONEPEZIL HYDROCHLORIDE 5 MG/1
5 TABLET, FILM COATED ORAL NIGHTLY
Status: DISCONTINUED | OUTPATIENT
Start: 2023-06-11 | End: 2023-06-12 | Stop reason: HOSPADM

## 2023-06-11 RX ORDER — LOSARTAN POTASSIUM 50 MG/1
50 TABLET ORAL DAILY
Status: DISCONTINUED | OUTPATIENT
Start: 2023-06-11 | End: 2023-06-12 | Stop reason: HOSPADM

## 2023-06-11 RX ORDER — ATORVASTATIN CALCIUM 40 MG/1
40 TABLET, FILM COATED ORAL DAILY
Status: DISCONTINUED | OUTPATIENT
Start: 2023-06-11 | End: 2023-06-12 | Stop reason: HOSPADM

## 2023-06-11 RX ADMIN — DOXYCYCLINE 100 MG: 100 INJECTION, POWDER, LYOPHILIZED, FOR SOLUTION INTRAVENOUS at 20:34

## 2023-06-11 RX ADMIN — INSULIN LISPRO 3 UNITS: 100 INJECTION, SOLUTION INTRAVENOUS; SUBCUTANEOUS at 22:31

## 2023-06-11 RX ADMIN — TAMSULOSIN HYDROCHLORIDE 0.4 MG: 0.4 CAPSULE ORAL at 20:10

## 2023-06-11 RX ADMIN — FAMOTIDINE 20 MG: 20 TABLET, FILM COATED ORAL at 20:10

## 2023-06-11 RX ADMIN — Medication 10 ML: at 22:32

## 2023-06-11 RX ADMIN — CHLORPROMAZINE HYDROCHLORIDE 25 MG: 25 TABLET, FILM COATED ORAL at 10:23

## 2023-06-11 RX ADMIN — PIPERACILLIN SODIUM AND TAZOBACTAM SODIUM 3.38 G: 3; .375 INJECTION, POWDER, LYOPHILIZED, FOR SOLUTION INTRAVENOUS at 22:31

## 2023-06-11 RX ADMIN — PIPERACILLIN SODIUM AND TAZOBACTAM SODIUM 3.38 G: 3; .375 INJECTION, POWDER, LYOPHILIZED, FOR SOLUTION INTRAVENOUS at 05:10

## 2023-06-11 RX ADMIN — DONEPEZIL HYDROCHLORIDE 5 MG: 5 TABLET, FILM COATED ORAL at 22:30

## 2023-06-11 RX ADMIN — PIPERACILLIN SODIUM AND TAZOBACTAM SODIUM 3.38 G: 3; .375 INJECTION, POWDER, LYOPHILIZED, FOR SOLUTION INTRAVENOUS at 12:34

## 2023-06-11 RX ADMIN — Medication 10 ML: at 10:23

## 2023-06-11 RX ADMIN — CHLORPROMAZINE HYDROCHLORIDE 25 MG: 25 TABLET, FILM COATED ORAL at 20:10

## 2023-06-11 RX ADMIN — FAMOTIDINE 20 MG: 20 TABLET, FILM COATED ORAL at 10:24

## 2023-06-11 RX ADMIN — ATORVASTATIN CALCIUM 40 MG: 40 TABLET, FILM COATED ORAL at 20:10

## 2023-06-11 RX ADMIN — INSULIN LISPRO 2 UNITS: 100 INJECTION, SOLUTION INTRAVENOUS; SUBCUTANEOUS at 12:34

## 2023-06-11 RX ADMIN — DOXYCYCLINE 100 MG: 100 INJECTION, POWDER, LYOPHILIZED, FOR SOLUTION INTRAVENOUS at 10:24

## 2023-06-11 RX ADMIN — LOSARTAN POTASSIUM 50 MG: 50 TABLET, FILM COATED ORAL at 20:10

## 2023-06-11 RX ADMIN — SODIUM CHLORIDE 100 ML/HR: 9 INJECTION, SOLUTION INTRAVENOUS at 05:18

## 2023-06-11 NOTE — PROGRESS NOTES
[facility]   Daily Progress Note    Patient Name: Mark Marie  : 1940  MRN: [id number]  Primary Care Physician:  Diana Linares MD  Date of admission: 2023    Subjective      Admitted for febrile illness and thrombocytopenia in the setting of known tick exposure    No acute issues overnight.  Remains afebrile.  Platelet and WBC counts have normalized.  LFTs are stable with mild elevation.  He has pain and muscle soreness continue to slowly improve.  His hiccups are better with Thorazine.  Not having much of his epigastric pain at this point, but it still presents itself intermittently.  Remainder of his tickborne illness labs are still pending. Discussed the case with the bedside nursing staff. No other acute concerns.    Objective      Vitals:   Temp:  [97.6 °F (36.4 °C)-98.5 °F (36.9 °C)] 98 °F (36.7 °C)  Heart Rate:  [78-90] 80  Resp:  [11-20] 20  BP: (119-149)/(53-72) 130/72    Physical Exam   GEN: WDWN elderly gentleman, sitting in the bedside chair, seems to have a bit more energy today, no acute distress  HEENT: NCAT, PERRLA, moist mucous membranes  NECK: Supple, midline trachea  CARD: RRR, peripheral edema  PULM: Fairly CTAB, diminished at the bases, non-distressed  ABD: soft, no abdominal tenderness to exam today, normoactive bowel sounds throughout  SKIN: Warm, dry, slight pallor  NEURO: Grossly intact, non-focal exam  PSYCH: Pleasant     Result Review    I have personally reviewed the results from the time of this admission to 2023 08:09 EDT and agree with these findings:  [x]  Laboratory  [x]  Microbiology  [x]  Radiology  [x]  EKG/Telemetry   [x]  Cardiology/Vascular   []  Pathology  []  Old records  []  Other:    CT Abdomen Pelvis Without Contrast    Result Date: 2023  CT ABDOMEN PELVIS WO CONTRAST Date of Exam: 2023 7:45 PM EDT Indication: Fever, history of recent nausea and vomiting. Right-sided abdominal pain. Comparison: None available.  Technique: Axial CT images were obtained of the abdomen and pelvis without the administration of contrast. Sagittal and coronal reconstructions were performed.  Automated exposure control and iterative reconstruction methods were used. Findings: Lower Thorax: There is mild linear opacity lung bases, scarring versus subsegmental atelectasis. Peritoneum: No free air or free fluid. There is hernia mesh material in the mid abdomen. There are sutures in the low pelvis which may be related to previous hernia repair as well. Appendix: Appendix is not well seen. No inflammatory findings in the right lower quadrant.. Kidneys: No hydronephrosis. No renal calculi. No focal renal lesions. Ureters: No obstructing calculi or mass. Urinary bladder: Urinary bladder has normal appearance on CT given degree of distention. Liver: No focal hepatic lesions. Hepatomegaly. Gallbladder and bile ducts: No gallstones. No bile duct dilatation. Gallbladder has normal appearance. Spleen: Spleen is normal size.  No focal splenic lesions. Adrenal glands: Unremarkable. Pancreas: No focal masses.  No pancreatic duct dilation. No surrounding inflammation. Abdominal aorta and Vascular Structures: No aneurysmal dilation. Moderate atherosclerotic disease. Stomach and Bowel:  Small hiatal hernia. There is some mildly dilated small bowel loops in the left upper quadrant with questionable wall thickening. The measure up to 2.9 cm. No transition point is seen. No significant bowel wall thickening.  Ligament of Treitz has normal anatomic position. There are numerous diverticuli in the sigmoid: And distal descending colon. There are less numerous seen in the transverse and ascending colon. None are acutely inflamed. Reproductive Organs: There are fiducial markers within the prostate gland. Lymph nodes: No pathologically enlarged lymph nodes. Soft tissues: Unremarkable. Osseous structures: No aggressive focal lytic or sclerotic osseous lesions. Evaluation of  bowel and solid organs is limited without contrast administration.     Impression: 1.There are some mildly dilated loops of small bowel with mild wall thickening left upper quadrant. Question enteritis. 2.Diverticulosis without diverticulitis. 3.Small hiatal hernia. 4.Hepatomegaly. Electronically Signed: Norma Hathaway  6/7/2023 8:05 PM EDT  Workstation ID: [id number]      Assessment & Plan      chlorproMAZINE, 25 mg, Oral, TID  doxycycline, 100 mg, Intravenous, Q12H  famotidine, 20 mg, Oral, BID AC  insulin lispro, 2-7 Units, Subcutaneous, 4x Daily AC & at Bedtime  piperacillin-tazobactam, 3.375 g, Intravenous, Once  piperacillin-tazobactam, 3.375 g, Intravenous, Q8H  sodium chloride, 10 mL, Intravenous, Q12H       sodium chloride, 100 mL/hr, Last Rate: 100 mL/hr (06/11/23 0518)         Active Hospital Problems    Diagnosis  POA   • **Sepsis, due to unspecified organism, unspecified whether acute organ dysfunction present [A41.9]  Yes   • Non-traumatic rhabdomyolysis [M62.82]  Yes   • Febrile illness [R50.9]  Yes   • Thrombocytopenia [D69.6]  Yes   • Elevated liver transaminase level [R74.01]  Yes   • Dysphagia [R13.10]  Yes   • Acute kidney injury [N17.9]  Yes   • Confusion [R41.0]  Yes   • History of prostate cancer [Z85.46]  Not Applicable   • Memory change [R41.3]  Yes   • Hyperlipidemia [E78.5]  Yes   • Hypertension [I10]  Yes   • Hypothyroidism [E03.9]  Yes      Resolved Hospital Problems   No resolved problems to display.       Plan:   Febrile illness with complications of elevated liver transaminase and thrombocytopenia.    Atraumatic rhabdomyolysis.  -DDx includes tickborne illness versus viral illness such as cytomegalovirus or Shobha-Barr virus  -Patient did end up recalling taking one or two ticks off a couple of weeks ago.   -COVID 19 screen and respiratory viral DNA panel are negative  -Chest x-ray was unremarkable  -Blood cultures remain negative thus far  -CT abdomen pelvis showed possible enteritis. I  don't this is the primary source of all of his presenting symptoms/findings but certainly worth watching. Continue IV Zosyn in the interim  -IV doxycycline to cover for tickborne illness  -Ehrlichia PCR and antibody panel pending  -Eloy mounted spotted fever antibody panel pending  -Lyme antibody panel negative  -CMV PCR and IgM negative.  -Shobha-Barr virus PCR and antibody panel suggest prior infection but nothing acute.   -Serum CK normalizing  -Monitor renal function and electrolytes daily      Episodic periods of dysphagia with shortness of breath that last several seconds to several minutes/  Hiccups  -Modified diet as per ST     Acute kidney injury, resolved  -Likely dehydration due to decreased oral intake prior to admission  -DC IVF today  -I believe some of his epigastric pain episodes are due to some anxiety.  He may also be having some esophageal spasm or sliding hiatal hernia concerns.  Trial of Ativan resulted in marked sleepiness, so this was discontinued.    Confusion at at home and on admission, along with weakness and falls  -Resolved  -The head CT did not show any acute findings     History of early dementia  -Continue donepezil     Type 2 diabetes  -Holding metformin   -Sliding scale insulin     Hyperlipidemia   -Continue simvastatin     Hypertension  -Continue losartan     Hypothyroidism  -Continue levothyroxine      History of prostate cancer  -Continue tamsulosin     PT/OT    DVT prophylaxis:  Mechanical DVT prophylaxis orders are present.    CODE STATUS:    Level Of Support Discussed With: Patient  Code Status (Patient has no pulse and is not breathing): CPR (Attempt to Resuscitate)  Medical Interventions (Patient has pulse or is breathing): Full Support  Release to patient: Routine Release    Disposition: Referral for skilled rehab at discharge.  He needs 3 midnights, and he has a bed available at [facility] on Monday.      Electronically signed by Thang Clayton MD, 06/11/23,  08:09 EDT.  [facility]

## 2023-06-11 NOTE — PLAN OF CARE
Goal Outcome Evaluation:  Plan of Care Reviewed With: patient        Progress: improving  Outcome Evaluation: Will cont to monitor.

## 2023-06-11 NOTE — THERAPY TREATMENT NOTE
"Subjective: Pt agreeable to therapeutic plan of care.    Objective:     Bed mobility - Supervision  Transfers - CGA and with rolling walker  Ambulation - 50 feet CGA and with rolling walker    Vitals: WNL    Pain:  c/o belly hurting  Intervention for pain: Repositioned, RN notified, Increased Activity, and Therapeutic Presence    Education: Provided education on the importance of mobility in the acute care setting, Verbal/Tactile Cues, ADL training, Transfer Training, and Gait Training    Assessment: Mark Marie presents with functional mobility impairments which indicate the need for skilled intervention. Tolerating session today without incident. Pt was able to transfer on and off bs commode with cga and vc's for proper hand placement, Presented with lat sway with gait and unable to take on challenges. Plans on rehab at [place].Will continue to follow and progress as tolerated.     Plan/Recommendations:   Moderate Intensity Therapy recommended post-acute care. This is recommended as therapy feels the patient would require 3-4 days per week and wouldn't tolerate \"3 hour daily\" rehab intensity. SNF would be the preferred choice. If the patient does not agree to SNF, arrange HH or OP depending on home bound status. If patient is medically complex, consider LTACH.. Pt requires no DME at discharge.     Pt desires Skilled Rehab placement at discharge. Pt cooperative; agreeable to therapeutic recommendations and plan of care.         Basic Mobility 6-click:  Rollin = Total, A lot = 2, A little = 3; 4 = None  Supine>Sit:   1 = Total, A lot = 2, A little = 3; 4 = None   Sit>Stand with arms:  1 = Total, A lot = 2, A little = 3; 4 = None  Bed>Chair:   1 = Total, A lot = 2, A little = 3; 4 = None  Ambulate in room:  1 = Total, A lot = 2, A little = 3; 4 = None  3-5 Steps with railin = Total, A lot = 2, A little = 3; 4 = None  Score: 18      Post-Tx Position: Supine with HOB Elevated, Alarms activated, and Call " light and personal items within reach  PPE: gloves

## 2023-06-11 NOTE — PROGRESS NOTES
Hematology/Oncology Inpatient Progress Note    PATIENT NAME: Mark Marie  : 1940  MRN: [id number]    CHIEF COMPLAINT: Fever    HISTORY OF PRESENT ILLNESS:      Mark Marie is a 82 y.o. male who presented to [facility] on 2023 at the direction of his primary care physician with complaints of fever, chills, weakness and falls.  Past medical history of type 2 diabetes mellitus, hypertension, hyperlipidemia, hypothyroidism, dementia prostate cancer in , squamous cell carcinoma of skin.  Patient reported he had been feeling poorly for over a week.  In addition to fever and chills he also reported nausea and diarrhea.  Intermittent episodes of dysphagia, difficulty breathing, hiccups and congestion.  Denied any abdominal pain or urinary symptoms.     Evaluation in the ED: WBC 3.90, hemoglobin 15 g/dL, MCV 91.8, platelets 43,000, bands 22%, lymphocytes 5%, plasma cells 1%.  BUN 27, creatinine 1.29, , ALT 53, negative respiratory panel, chest x-ray showing no acute cardiopulmonary disease, blood cultures drawn and pending, CT of the head without contrast showing no acute intracranial abnormality; age-appropriate volume loss; mild to moderate amount of low-density periventricular and subcortical white matter most commonly seen with chronic small vessel ischemic change.  UA positive for large amount of blood, trace leukocytes CT of the abdomen and pelvis without contrast showed possible enteritis, diverticulosis without diverticulitis, small hiatal hernia, hepatomegaly.  PT 12.0, INR 1.13.     23  Hematology/Oncology was consulted for acute thrombocytopenia.  Patient had platelets presented with platelet count of 43,000 with bandemia.  He also has had a febrile illness no obvious source at this time..  Patient has a history of underlying mild dementia     He/She  has a past medical history of Cataract (removed 16 years ago), Diabetes mellitus, HL (hearing loss) (have worn hearing  aids for 16 years), Hyperlipidemia, Hypertension, Hypothyroidism, Joint pain, Low back pain, Prostate cancer (2011), and Squamous cell carcinoma of skin.     PCP: Diana Linares MD    Subjective      Patient denies any new issues    ROS:    Review of Systems   Constitutional:  Positive for fatigue. Negative for chills and fever.   HENT:  Negative for congestion, drooling, ear discharge, rhinorrhea, sinus pressure and tinnitus.    Eyes:  Negative for photophobia, pain and discharge.   Respiratory:  Negative for apnea, choking and stridor.    Cardiovascular:  Negative for palpitations.   Gastrointestinal:  Negative for abdominal distention, abdominal pain and anal bleeding.   Endocrine: Negative for polydipsia and polyphagia.   Genitourinary:  Negative for decreased urine volume, flank pain and genital sores.   Musculoskeletal:  Negative for gait problem, neck pain and neck stiffness.   Skin:  Negative for color change, rash and wound.   Neurological:  Positive for weakness. Negative for tremors, seizures, syncope, facial asymmetry and speech difficulty.   Hematological:  Negative for adenopathy.   Psychiatric/Behavioral:  Negative for agitation, confusion, hallucinations and self-injury. The patient is not hyperactive.       MEDICATIONS:      Scheduled Meds:  chlorproMAZINE, 25 mg, Oral, TID  doxycycline, 100 mg, Intravenous, Q12H  famotidine, 20 mg, Oral, BID AC  insulin lispro, 2-7 Units, Subcutaneous, 4x Daily AC & at Bedtime  piperacillin-tazobactam, 3.375 g, Intravenous, Q8H  sodium chloride, 10 mL, Intravenous, Q12H       Continuous Infusions:  sodium chloride, 100 mL/hr, Last Rate: 100 mL/hr (06/11/23 0518)       PRN Meds:  •  acetaminophen  •  Calcium Replacement - Follow Nurse / BPA Driven Protocol  •  dextrose  •  dextrose  •  glucagon (human recombinant)  •  Magnesium Standard Dose Replacement - Follow Nurse / BPA Driven Protocol  •  Phosphorus Replacement - Follow Nurse / BPA Driven Protocol  •   "polyethylene glycol  •  Potassium Replacement - Follow Nurse / BPA Driven Protocol  •  prochlorperazine  •  sodium chloride  •  sodium chloride  •  sodium chloride     ALLERGIES:    Allergies   Allergen Reactions   • Ramipril Swelling     Leg swelling       Objective    VITALS:   /72 (BP Location: Left arm, Patient Position: Lying)   Pulse 80   Temp 98 °F (36.7 °C) (Axillary)   Resp 20   Ht 162.6 cm (64\")   Wt 87.8 kg (193 lb 9 oz)   SpO2 96%   BMI 33.23 kg/m²     PHYSICAL EXAM:     Physical Exam  Vitals and nursing note reviewed.   Constitutional:       General: He is not in acute distress.     Appearance: He is not diaphoretic.   HENT:      Head: Normocephalic and atraumatic.   Eyes:      General: No scleral icterus.        Right eye: No discharge.         Left eye: No discharge.      Conjunctiva/sclera: Conjunctivae normal.   Neck:      Thyroid: No thyromegaly.   Cardiovascular:      Rate and Rhythm: Normal rate and regular rhythm.      Heart sounds: Normal heart sounds.     No friction rub. No gallop.   Pulmonary:      Effort: Pulmonary effort is normal. No respiratory distress.      Breath sounds: No stridor. No wheezing.   Abdominal:      General: Bowel sounds are normal.      Palpations: Abdomen is soft. There is no mass.      Tenderness: There is no abdominal tenderness. There is no guarding or rebound.   Musculoskeletal:         General: No tenderness. Normal range of motion.      Cervical back: Normal range of motion and neck supple.   Lymphadenopathy:      Cervical: No cervical adenopathy.   Skin:     General: Skin is warm.      Findings: No erythema or rash.   Neurological:      Mental Status: He is alert and oriented to person, place, and time.      Motor: No abnormal muscle tone.   Psychiatric:         Behavior: Behavior normal.   I have reexamined the patient and the results are consistent with the previously documented exam. Hallie Valencia MD     RECENT LABS:    Lab Results (last " 24 hours)       Procedure Component Value Units Date/Time    POC Glucose Once [[id number]]  (Normal) Collected: 06/11/23 0727    Specimen: Blood Updated: 06/11/23 0728     Glucose 97 mg/dL      Comment: Serial Number: [id number]:  [id number]       Manual Differential [[id number]]  (Abnormal) Collected: 06/11/23 0219    Specimen: Blood Updated: 06/11/23 0343     Neutrophil % 52.0 %      Lymphocyte % 16.0 %      Monocyte % 8.0 %      Eosinophil % 2.0 %      Bands %  8.0 %      Metamyelocyte % 7.0 %      Promyelocyte % 2.0 %      Atypical Lymphocyte % 3.0 %      Blasts % 1.0 %      Plasma Cells % 1.0 %      Neutrophils Absolute 2.52 10*3/mm3      Lymphocytes Absolute 0.80 10*3/mm3      Monocytes Absolute 0.34 10*3/mm3      Eosinophils Absolute 0.08 10*3/mm3      RBC Morphology Normal     WBC Morphology Normal     Large Platelets Slight/1+    Narrative:      Reviewed by Pathologist within the past 30 days on 06.12.2023.      CBC & Differential [[id number]]  (Abnormal) Collected: 06/11/23 0219    Specimen: Blood Updated: 06/11/23 0343    Narrative:      The following orders were created for panel order CBC & Differential.  Procedure                               Abnormality         Status                     ---------                               -----------         ------                     CBC Auto Differential[[id number]]        Abnormal            Final result               Scan Slide[[id number]]                                       Final result                 Please view results for these tests on the individual orders.    Scan Slide [[id number]] Collected: 06/11/23 0219    Specimen: Blood Updated: 06/11/23 0343     Scan Slide --     Comment: See Manual Differential Results       CBC Auto Differential [[id number]]  (Abnormal) Collected: 06/11/23 0219    Specimen: Blood Updated: 06/11/23 0343     WBC 4.20 10*3/mm3      RBC 3.86 10*6/mm3      Hemoglobin 11.9 g/dL      Hematocrit 35.2 %      MCV 91.2 fL      MCH  30.9 pg      MCHC 33.8 g/dL      RDW 13.4 %      RDW-SD 45.1 fl      MPV 7.8 fL      Platelets 142 10*3/mm3     Narrative:      The previously reported component NRBC is no longer being reported. Previous result was 0.1 /100 WBC (Reference Range: 0.0-0.2 /100 WBC) on 6/11/2023 at 0250 EDT.    Comprehensive Metabolic Panel [[id number]]  (Abnormal) Collected: 06/11/23 0219    Specimen: Blood Updated: 06/11/23 0255     Glucose 111 mg/dL      BUN 9 mg/dL      Creatinine 0.90 mg/dL      Sodium 144 mmol/L      Potassium 3.7 mmol/L      Chloride 113 mmol/L      CO2 23.0 mmol/L      Calcium 8.0 mg/dL      Total Protein 5.1 g/dL      Albumin 2.4 g/dL      ALT (SGPT) 73 U/L      AST (SGOT) 183 U/L      Alkaline Phosphatase 64 U/L      Total Bilirubin 0.3 mg/dL      Globulin 2.7 gm/dL      A/G Ratio 0.9 g/dL      BUN/Creatinine Ratio 10.0     Anion Gap 8.0 mmol/L      eGFR 86.3 mL/min/1.73     Narrative:      GFR Normal >60  Chronic Kidney Disease <60  Kidney Failure <15    The GFR formula is only valid for adults with stable renal function between ages 18 and 70.    POC Glucose Once [[id number]]  (Abnormal) Collected: 06/10/23 2023    Specimen: Blood Updated: 06/10/23 2024     Glucose 172 mg/dL      Comment: Serial Number: [id number]:  [id number]       POC Glucose Once [[id number]]  (Abnormal) Collected: 06/10/23 1736    Specimen: Blood Updated: 06/10/23 1737     Glucose 157 mg/dL      Comment: Serial Number: [id number]:  [id number]       Blood Culture - Blood, Arm, Right [[id number]]  (Normal) Collected: 06/07/23 1639    Specimen: Blood from Arm, Right Updated: 06/10/23 1645     Blood Culture No growth at 3 days    Narrative:      Less than seven (7) mL's of blood was collected.  Insufficient quantity may yield false negative results.    Blood Culture - Blood, Arm, Right [[id number]]  (Normal) Collected: 06/07/23 1553    Specimen: Blood from Arm, Right Updated: 06/10/23 1600     Blood Culture No growth at 3 days     CMV DNA, Quantitative, PCR [[id number]] Collected: 06/07/23 2246    Specimen: Blood Updated: 06/10/23 1410     CMV DNA Quant Negative IU/mL      Comment: No CMV DNA detected.  The quantitative range of this assay is 200 to 1 million IU/mL.        log 10 CMV Qn DNA PI TNP log10 IU/mL      Comment: Unable to calculate result since non-numeric result obtained for  component test.       Narrative:      Performed at:  [facility]  [id number]  : Tanya Arboleda MD, Phone:  [phone]    Shobha-Barr Virus DNA, Quantitative [[id number]] Collected: 06/07/23 2246    Specimen: Blood Updated: 06/10/23 1410     EBV DNA, Quant PCR, Plasma 873 IU/mL      Comment: The linear range of this assay is 35 - 100,000,000 IU/mL        log10 EBV DNA, Plasma 2.941 log10 IU/mL     Narrative:      Performed at:  [facility]  [id number]  : Tanya Arboleda MD, Phone:  [phone]    POC Glucose Once [[id number]]  (Abnormal) Collected: 06/10/23 1217    Specimen: Blood Updated: 06/10/23 1218     Glucose 127 mg/dL      Comment: Serial Number: [id number]:  [id number]               PENDING RESULTS:     IMAGING REVIEWED:  No radiology results for the last day    Assessment & Plan   ASSESSMENT:  1. Acute thrombocytopenia: Review of chart shows platelet count of 264,000 in February 2023.  Patient with platelet count of 43,000 upon presentation and now currently improved 46,000.  Normal hemoglobin and white blood cell count upon presentation but currently decreased to 2.10 and 12.0 g/dL.  No known heparin exposure.  Patient with elevated LFTs and hepatomegaly on the CT.  Febrile illness of presentation.  Additional work-up ordered to evaluate for hemolysis, nutritional deficiency, hematological malignancy.  Thus far no sign of nutritional deficiency or hemolysis.  Has a very elevated LDH and a low reticulocyte count suggesting bone marrow  suppression.  He is also being evaluated for viral and tickborne illnesses.  Peripheral smear negative for blasts.  No M spike noted on SPEP.  Overall counts are recovering well.  2. Febrile illness: On IV doxycycline.  Blood cultures pending.  EBV/CMV not consistent with active infection.  Tick panel in progress but negative for Lyme.  Managed by his primary  3. Acute kidney injury: On IV hydration.  Creatinine has improved  4. Confusion and falls at home: CT of the head with no acute findings.  This is slowly improving  5. Multiple chronic conditions: Type 2 diabetes mellitus, hyperlipidemia, hypertension, hypothyroidism.  6. History of prostate cancer in 2011, history of squamous cell carcinoma of the skin.    PLANS:    1. Overall clinical improvement  2. Daily CBCs  3. Platelets have normalized but remains mildly anemic  4. Monitor CBC daily and transfuse for platelets less than 20,000 or signs of active bleeding.  5. Discussed with patient and nursing        Thank you for this consult. We will be happy to follow along with you.  We will continue to follow along with you    Electronically signed by Hallie Valencia MD, 06/11/23, 12:51 PM EDT.

## 2023-06-11 NOTE — PLAN OF CARE
Assessment: Mark Marie presents with functional mobility impairments which indicate the need for skilled intervention. Tolerating session today without incident. Pt was able to transfer on and off bs commode with cga and vc's for proper hand placement, Presented with lat sway with gait and unable to take on challenges. Plans on rehab at [facility].Will continue to follow and progress as tolerated.

## 2023-06-12 VITALS
RESPIRATION RATE: 16 BRPM | WEIGHT: 190.48 LBS | DIASTOLIC BLOOD PRESSURE: 68 MMHG | TEMPERATURE: 98.1 F | HEIGHT: 64 IN | HEART RATE: 70 BPM | BODY MASS INDEX: 32.52 KG/M2 | OXYGEN SATURATION: 98 % | SYSTOLIC BLOOD PRESSURE: 146 MMHG

## 2023-06-12 PROBLEM — A41.9 SEPSIS, DUE TO UNSPECIFIED ORGANISM, UNSPECIFIED WHETHER ACUTE ORGAN DYSFUNCTION PRESENT: Status: RESOLVED | Noted: 2023-06-07 | Resolved: 2023-06-12

## 2023-06-12 PROBLEM — R06.6 HICCUPS: Chronic | Status: ACTIVE | Noted: 2023-06-12

## 2023-06-12 PROBLEM — A77.40 EHRLICHIOSIS, UNSPECIFIED: Status: ACTIVE | Noted: 2023-06-12

## 2023-06-12 PROBLEM — N17.9 ACUTE KIDNEY INJURY: Status: RESOLVED | Noted: 2023-06-07 | Resolved: 2023-06-12

## 2023-06-12 PROBLEM — E66.9 OBESITY (BMI 30-39.9): Chronic | Status: ACTIVE | Noted: 2023-06-12

## 2023-06-12 PROBLEM — R50.9 FEBRILE ILLNESS: Status: RESOLVED | Noted: 2023-06-07 | Resolved: 2023-06-12

## 2023-06-12 PROBLEM — R41.0 CONFUSION: Status: RESOLVED | Noted: 2023-06-07 | Resolved: 2023-06-12

## 2023-06-12 PROBLEM — M62.82 NON-TRAUMATIC RHABDOMYOLYSIS: Status: RESOLVED | Noted: 2023-06-09 | Resolved: 2023-06-12

## 2023-06-12 PROBLEM — R13.10 DYSPHAGIA: Status: RESOLVED | Noted: 2023-06-07 | Resolved: 2023-06-12

## 2023-06-12 LAB
ALBUMIN SERPL-MCNC: 2.5 G/DL (ref 3.5–5.2)
ALBUMIN/GLOB SERPL: 0.9 G/DL
ALP SERPL-CCNC: 73 U/L (ref 39–117)
ALT SERPL W P-5'-P-CCNC: 99 U/L (ref 1–41)
ANION GAP SERPL CALCULATED.3IONS-SCNC: 7 MMOL/L (ref 5–15)
AST SERPL-CCNC: 176 U/L (ref 1–40)
BACTERIA SPEC AEROBE CULT: NORMAL
BACTERIA SPEC AEROBE CULT: NORMAL
BILIRUB SERPL-MCNC: 0.3 MG/DL (ref 0–1.2)
BLASTS NFR BLD MANUAL: 14 % (ref 0–0)
BUN SERPL-MCNC: 10 MG/DL (ref 8–23)
BUN/CREAT SERPL: 11.6 (ref 7–25)
CALCIUM SPEC-SCNC: 8.3 MG/DL (ref 8.6–10.5)
CHLORIDE SERPL-SCNC: 114 MMOL/L (ref 98–107)
CO2 SERPL-SCNC: 27 MMOL/L (ref 22–29)
CREAT SERPL-MCNC: 0.86 MG/DL (ref 0.76–1.27)
DEPRECATED RDW RBC AUTO: 43.8 FL (ref 37–54)
EGFRCR SERPLBLD CKD-EPI 2021: 87.5 ML/MIN/1.73
EOSINOPHIL # BLD MANUAL: 0.24 10*3/MM3 (ref 0–0.4)
EOSINOPHIL NFR BLD MANUAL: 5 % (ref 0.3–6.2)
ERYTHROCYTE [DISTWIDTH] IN BLOOD BY AUTOMATED COUNT: 13.6 % (ref 12.3–15.4)
GLOBULIN UR ELPH-MCNC: 2.9 GM/DL
GLUCOSE BLDC GLUCOMTR-MCNC: 228 MG/DL (ref 70–105)
GLUCOSE BLDC GLUCOMTR-MCNC: 97 MG/DL (ref 70–105)
GLUCOSE SERPL-MCNC: 97 MG/DL (ref 65–99)
HCT VFR BLD AUTO: 36 % (ref 37.5–51)
HGB BLD-MCNC: 12 G/DL (ref 13–17.7)
LAB AP CASE REPORT: NORMAL
LYMPHOCYTES # BLD MANUAL: 0.66 10*3/MM3 (ref 0.7–3.1)
LYMPHOCYTES NFR BLD MANUAL: 13 % (ref 5–12)
MCH RBC QN AUTO: 31 PG (ref 26.6–33)
MCHC RBC AUTO-ENTMCNC: 33.4 G/DL (ref 31.5–35.7)
MCV RBC AUTO: 92.6 FL (ref 79–97)
MONOCYTES # BLD: 0.61 10*3/MM3 (ref 0.1–0.9)
MYELOCYTES NFR BLD MANUAL: 8 % (ref 0–0)
NEUTROPHILS # BLD AUTO: 2.16 10*3/MM3 (ref 1.7–7)
NEUTROPHILS NFR BLD MANUAL: 41 % (ref 42.7–76)
NEUTS BAND NFR BLD MANUAL: 5 % (ref 0–5)
PATH REPORT.FINAL DX SPEC: NORMAL
PATHOLOGY REVIEW: YES
PLAT MORPH BLD: NORMAL
PLATELET # BLD AUTO: 185 10*3/MM3 (ref 140–450)
PMV BLD AUTO: 7.5 FL (ref 6–12)
POTASSIUM SERPL-SCNC: 3.8 MMOL/L (ref 3.5–5.2)
PROT SERPL-MCNC: 5.4 G/DL (ref 6–8.5)
RBC # BLD AUTO: 3.88 10*6/MM3 (ref 4.14–5.8)
RBC MORPH BLD: NORMAL
SCAN SLIDE: NORMAL
SODIUM SERPL-SCNC: 148 MMOL/L (ref 136–145)
VARIANT LYMPHS NFR BLD MANUAL: 10 % (ref 19.6–45.3)
VARIANT LYMPHS NFR BLD MANUAL: 4 % (ref 0–5)
WBC MORPH BLD: NORMAL
WBC NRBC COR # BLD: 4.7 10*3/MM3 (ref 3.4–10.8)

## 2023-06-12 PROCEDURE — 85007 BL SMEAR W/DIFF WBC COUNT: CPT | Performed by: FAMILY MEDICINE

## 2023-06-12 PROCEDURE — 63710000001 INSULIN LISPRO (HUMAN) PER 5 UNITS: Performed by: NURSE PRACTITIONER

## 2023-06-12 PROCEDURE — 25010000002 PIPERACILLIN SOD-TAZOBACTAM PER 1 G: Performed by: NURSE PRACTITIONER

## 2023-06-12 PROCEDURE — 80053 COMPREHEN METABOLIC PANEL: CPT | Performed by: FAMILY MEDICINE

## 2023-06-12 PROCEDURE — 99232 SBSQ HOSP IP/OBS MODERATE 35: CPT | Performed by: INTERNAL MEDICINE

## 2023-06-12 PROCEDURE — 85025 COMPLETE CBC W/AUTO DIFF WBC: CPT | Performed by: FAMILY MEDICINE

## 2023-06-12 PROCEDURE — 82948 REAGENT STRIP/BLOOD GLUCOSE: CPT

## 2023-06-12 RX ORDER — FAMOTIDINE 20 MG/1
20 TABLET, FILM COATED ORAL
Qty: 60 TABLET | Refills: 0 | Status: SHIPPED | OUTPATIENT
Start: 2023-06-12

## 2023-06-12 RX ORDER — CHLORPROMAZINE HYDROCHLORIDE 25 MG/1
25 TABLET, FILM COATED ORAL 3 TIMES DAILY
Qty: 90 TABLET | Refills: 0 | Status: SHIPPED | OUTPATIENT
Start: 2023-06-12

## 2023-06-12 RX ORDER — DOXYCYCLINE HYCLATE 100 MG/1
100 CAPSULE ORAL 2 TIMES DAILY
Qty: 18 CAPSULE | Refills: 0 | Status: SHIPPED | OUTPATIENT
Start: 2023-06-12 | End: 2023-06-21

## 2023-06-12 RX ADMIN — TAMSULOSIN HYDROCHLORIDE 0.4 MG: 0.4 CAPSULE ORAL at 10:51

## 2023-06-12 RX ADMIN — PIPERACILLIN SODIUM AND TAZOBACTAM SODIUM 3.38 G: 3; .375 INJECTION, POWDER, LYOPHILIZED, FOR SOLUTION INTRAVENOUS at 05:10

## 2023-06-12 RX ADMIN — DOXYCYCLINE 100 MG: 100 INJECTION, POWDER, LYOPHILIZED, FOR SOLUTION INTRAVENOUS at 10:50

## 2023-06-12 RX ADMIN — LEVOTHYROXINE SODIUM 25 MCG: 0.03 TABLET ORAL at 05:10

## 2023-06-12 RX ADMIN — INSULIN LISPRO 3 UNITS: 100 INJECTION, SOLUTION INTRAVENOUS; SUBCUTANEOUS at 12:08

## 2023-06-12 RX ADMIN — LOSARTAN POTASSIUM 50 MG: 50 TABLET, FILM COATED ORAL at 10:51

## 2023-06-12 RX ADMIN — CHLORPROMAZINE HYDROCHLORIDE 25 MG: 25 TABLET, FILM COATED ORAL at 10:50

## 2023-06-12 RX ADMIN — Medication 10 ML: at 10:51

## 2023-06-12 RX ADMIN — FAMOTIDINE 20 MG: 20 TABLET, FILM COATED ORAL at 10:51

## 2023-06-12 RX ADMIN — ATORVASTATIN CALCIUM 40 MG: 40 TABLET, FILM COATED ORAL at 10:50

## 2023-06-12 NOTE — CASE MANAGEMENT/SOCIAL WORK
Case Management Discharge Note      Final Note: Ania Cisneros         Selected Continued Care - Discharged on 6/12/2023 Admission date: 6/7/2023 - Discharge disposition: Skilled Nursing Facility (DC - External)      Destination Coordination complete.      Service Provider Selected Services Address Phone Fax Patient Preferred    [facility] [address] [facility] [place] [address] [phone] --               Transportation Services  Private: Car    Final Discharge Disposition Code: 03 - skilled nursing facility (SNF)

## 2023-06-12 NOTE — PROGRESS NOTES
Hematology/Oncology Inpatient Progress Note    PATIENT NAME: Mark Marie  : 1940  MRN: [id number]    CHIEF COMPLAINT: Fever    HISTORY OF PRESENT ILLNESS:      Mark Marie is a 82 y.o. male who presented to [facility] on 2023 at the direction of his primary care physician with complaints of fever, chills, weakness and falls.  Past medical history of type 2 diabetes mellitus, hypertension, hyperlipidemia, hypothyroidism, dementia prostate cancer in , squamous cell carcinoma of skin.  Patient reported he had been feeling poorly for over a week.  In addition to fever and chills he also reported nausea and diarrhea.  Intermittent episodes of dysphagia, difficulty breathing, hiccups and congestion.  Denied any abdominal pain or urinary symptoms.     Evaluation in the ED: WBC 3.90, hemoglobin 15 g/dL, MCV 91.8, platelets 43,000, bands 22%, lymphocytes 5%, plasma cells 1%.  BUN 27, creatinine 1.29, , ALT 53, negative respiratory panel, chest x-ray showing no acute cardiopulmonary disease, blood cultures drawn and pending, CT of the head without contrast showing no acute intracranial abnormality; age-appropriate volume loss; mild to moderate amount of low-density periventricular and subcortical white matter most commonly seen with chronic small vessel ischemic change.  UA positive for large amount of blood, trace leukocytes CT of the abdomen and pelvis without contrast showed possible enteritis, diverticulosis without diverticulitis, small hiatal hernia, hepatomegaly.  PT 12.0, INR 1.13.     23  Hematology/Oncology was consulted for acute thrombocytopenia.  Patient had platelets presented with platelet count of 43,000 with bandemia.  He also has had a febrile illness no obvious source at this time..  Patient has a history of underlying mild dementia     He/She  has a past medical history of Cataract (removed 16 years ago), Diabetes mellitus, HL (hearing loss) (have worn hearing  "aids for 16 years), Hyperlipidemia, Hypertension, Hypothyroidism, Joint pain, Low back pain, Prostate cancer (2011), and Squamous cell carcinoma of skin.     PCP: Diana Linares MD    Subjective      No significant issues.  Discharge to rehab today    ROS:    Review of Systems   Constitutional: Positive for fatigue. Negative for chills and fever.   HENT: Negative for congestion, drooling, ear discharge, rhinorrhea, sinus pressure and tinnitus.    Eyes: Negative for photophobia, pain and discharge.   Respiratory: Negative for apnea, choking and stridor.    Cardiovascular: Negative for palpitations.   Gastrointestinal: Negative for abdominal distention, abdominal pain and anal bleeding.   Endocrine: Negative for polydipsia and polyphagia.   Genitourinary: Negative for decreased urine volume, flank pain and genital sores.   Musculoskeletal: Negative for gait problem, neck pain and neck stiffness.   Skin: Negative for color change, rash and wound.   Neurological: Positive for weakness. Negative for tremors, seizures, syncope, facial asymmetry and speech difficulty.   Hematological: Negative for adenopathy.   Psychiatric/Behavioral: Negative for agitation, confusion, hallucinations and self-injury. The patient is not hyperactive.         MEDICATIONS:      Scheduled Meds:     Continuous Infusions:  No current facility-administered medications for this encounter.     PRN Meds:       ALLERGIES:    Allergies   Allergen Reactions   • Ramipril Swelling     Leg swelling       Objective    VITALS:   /68 (BP Location: Left arm, Patient Position: Lying)   Pulse 70   Temp 98.1 °F (36.7 °C) (Oral)   Resp 16   Ht 162.6 cm (64\")   Wt 86.4 kg (190 lb 7.6 oz)   SpO2 98%   BMI 32.70 kg/m²     PHYSICAL EXAM:     Physical Exam  Vitals and nursing note reviewed.   Constitutional:       General: He is not in acute distress.     Appearance: He is not diaphoretic.   HENT:      Head: Normocephalic and atraumatic.   Eyes:      " General: No scleral icterus.        Right eye: No discharge.         Left eye: No discharge.      Conjunctiva/sclera: Conjunctivae normal.   Neck:      Thyroid: No thyromegaly.   Cardiovascular:      Rate and Rhythm: Normal rate and regular rhythm.      Heart sounds: Normal heart sounds.     No friction rub. No gallop.   Pulmonary:      Effort: Pulmonary effort is normal. No respiratory distress.      Breath sounds: No stridor. No wheezing.   Abdominal:      General: Bowel sounds are normal.      Palpations: Abdomen is soft. There is no mass.      Tenderness: There is no abdominal tenderness. There is no guarding or rebound.   Musculoskeletal:         General: No tenderness. Normal range of motion.      Cervical back: Normal range of motion and neck supple.   Lymphadenopathy:      Cervical: No cervical adenopathy.   Skin:     General: Skin is warm.      Findings: No erythema or rash.   Neurological:      Mental Status: He is alert and oriented to person, place, and time.      Motor: No abnormal muscle tone.   Psychiatric:         Behavior: Behavior normal.     I have reexamined the patient and the results are consistent with the previously documented exam. Hallie Devi Valencia MD     RECENT LABS:    Lab Results (last 24 hours)     Procedure Component Value Units Date/Time    POC Glucose Once [[id number]]  (Abnormal) Collected: 06/12/23 1140    Specimen: Blood Updated: 06/12/23 1141     Glucose 228 mg/dL      Comment: Serial Number: [id number]:  [id number]       Flow Cytometry [[id number]] Collected: 06/12/23 0902    Specimen: Blood Updated: 06/12/23 0943    Pathology Consultation [[id number]] Collected: 06/12/23 0500    Specimen: Blood, Venous Line Updated: 06/12/23 0910     Final Diagnosis --     Rare immature monocytes suspicious for blasts  Anemia       Case Report --     Surgical Pathology Report                         Case: [id number]                                  Authorizing Provider:  Thang Clayton  MD BOB       Collected:           06/12/2023 05:00 AM          Ordering Location:     [address]    Received:            06/12/2023 07:46 AM                                 MEDICAL INPATIENT                                                            Pathologist:           Gennaro Haywood MD                                                            Specimen:    Blood, Venous Line                                                                         Path Consult Reflex [[id number]] Collected: 06/12/23 0500    Specimen: Blood Updated: 06/12/23 0712     Pathology Review Yes    Manual Differential [[id number]]  (Abnormal) Collected: 06/12/23 0500    Specimen: Blood Updated: 06/12/23 0712     Neutrophil % 41.0 %      Lymphocyte % 10.0 %      Monocyte % 13.0 %      Eosinophil % 5.0 %      Bands %  5.0 %      Myelocyte % 8.0 %      Atypical Lymphocyte % 4.0 %      Blasts % 14.0 %      Neutrophils Absolute 2.16 10*3/mm3      Lymphocytes Absolute 0.66 10*3/mm3      Monocytes Absolute 0.61 10*3/mm3      Eosinophils Absolute 0.24 10*3/mm3      RBC Morphology Normal     WBC Morphology Normal     Platelet Morphology Normal    CBC & Differential [[id number]]  (Abnormal) Collected: 06/12/23 0500    Specimen: Blood Updated: 06/12/23 0712    Narrative:      The following orders were created for panel order CBC & Differential.  Procedure                               Abnormality         Status                     ---------                               -----------         ------                     CBC Auto Differential[[id number]]        Abnormal            Final result               Scan Slide[[id number]]                                       Final result                 Please view results for these tests on the individual orders.    Scan Slide [[id number]] Collected: 06/12/23 0500    Specimen: Blood Updated: 06/12/23 0712     Scan Slide --     Comment: See Manual Differential Results       CBC Auto Differential [[id number]]   (Abnormal) Collected: 06/12/23 0500    Specimen: Blood Updated: 06/12/23 0712     WBC 4.70 10*3/mm3      RBC 3.88 10*6/mm3      Hemoglobin 12.0 g/dL      Hematocrit 36.0 %      MCV 92.6 fL      MCH 31.0 pg      MCHC 33.4 g/dL      RDW 13.6 %      RDW-SD 43.8 fl      MPV 7.5 fL      Platelets 185 10*3/mm3     Narrative:      The previously reported component NRBC is no longer being reported. Previous result was 0.2 /100 WBC (Reference Range: 0.0-0.2 /100 WBC) on 6/12/2023 at 0610 EDT.    POC Glucose Once [[id number]]  (Normal) Collected: 06/12/23 0705    Specimen: Blood Updated: 06/12/23 0706     Glucose 97 mg/dL      Comment: Serial Number: [id number]:  [id number]       Comprehensive Metabolic Panel [[id number]]  (Abnormal) Collected: 06/12/23 0500    Specimen: Blood Updated: 06/12/23 0635     Glucose 97 mg/dL      BUN 10 mg/dL      Creatinine 0.86 mg/dL      Sodium 148 mmol/L      Potassium 3.8 mmol/L      Comment: Slight hemolysis detected by analyzer. Results may be affected.        Chloride 114 mmol/L      CO2 27.0 mmol/L      Calcium 8.3 mg/dL      Total Protein 5.4 g/dL      Albumin 2.5 g/dL      ALT (SGPT) 99 U/L      AST (SGOT) 176 U/L      Comment: Slight hemolysis detected by analyzer. Results may be affected.        Alkaline Phosphatase 73 U/L      Total Bilirubin 0.3 mg/dL      Globulin 2.9 gm/dL      A/G Ratio 0.9 g/dL      BUN/Creatinine Ratio 11.6     Anion Gap 7.0 mmol/L      eGFR 87.5 mL/min/1.73     Narrative:      GFR Normal >60  Chronic Kidney Disease <60  Kidney Failure <15    The GFR formula is only valid for adults with stable renal function between ages 18 and 70.    POC Glucose Once [[id number]]  (Abnormal) Collected: 06/11/23 2056    Specimen: Blood Updated: 06/11/23 2057     Glucose 209 mg/dL      Comment: Serial Number: [id number]:  [id number]       POC Glucose Once [[id number]]  (Abnormal) Collected: 06/11/23 1716    Specimen: Blood Updated: 06/11/23 1718     Glucose 151 mg/dL       Comment: Serial Number: [id number]:  [id number]             PENDING RESULTS:     IMAGING REVIEWED:  No radiology results for the last day    Assessment & Plan   ASSESSMENT:  1. Acute thrombocytopenia: Review of chart shows platelet count of 264,000 in February 2023.  Patient with platelet count of 43,000 upon presentation and now currently improved 46,000.  Normal hemoglobin and white blood cell count upon presentation but currently decreased to 2.10 and 12.0 g/dL.  No known heparin exposure.  Patient with elevated LFTs and hepatomegaly on the CT.  Febrile illness of presentation.  Additional work-up ordered to evaluate for hemolysis, nutritional deficiency, hematological malignancy.  Thus far no sign of nutritional deficiency or hemolysis.  Has a very elevated LDH and a low reticulocyte count suggesting bone marrow suppression.  He is also being evaluated for viral and tickborne illnesses.  Peripheral smear negative for blasts.  No M spike noted on SPEP.  Overall counts are recovering well.  On his dip today patient has up to 14% blasts therefore have sent his blood out for flow cytometry.  We will follow-up with the reports in the office.  2. Febrile illness: On IV doxycycline.  Blood cultures pending.  EBV/CMV not consistent with active infection.  Tick panel in progress but negative for Lyme.  Managed by his primary  3. Acute kidney injury: Currently and has improved  4. Confusion and falls at home: CT of the head with no acute findings.  His confusion has also largely resolved  5. Multiple chronic conditions: Type 2 diabetes mellitus, hyperlipidemia, hypertension, hypothyroidism.  6. History of prostate cancer in 2011, history of squamous cell carcinoma of the skin.    PLANS:    1. Overall clinical improvement  2. Flow cytometry stat, track results in the outpatient setting  3. Peripheral smear today  4. Daily CBCs  5. Platelets have normalized but remains mildly anemic  6. Monitor CBC daily and  transfuse for platelets less than 20,000 or signs of active bleeding.  7. Discussed with patient and nursing        Thank you for this consult. We will be happy to follow along with you.  We will continue to follow along with you      Electronically signed by Hallie Valencia MD, 06/12/23, 5:45 PM EDT.

## 2023-06-12 NOTE — PLAN OF CARE
Goal Outcome Evaluation:              Outcome Evaluation: Pt has had no c/o pain this shift, vss, call light in reach, bed alarm on, plan of care ongoing

## 2023-06-12 NOTE — PLAN OF CARE
Goal Outcome Evaluation:                 Goals met. Discharging patient to [facility].

## 2023-06-12 NOTE — PROGRESS NOTES
Enter Query Response Below      Query Response: Acute kidney injury was definitely secondary to sepsis.  The anemia and thrombocytopenia were likely multifactorial, primarily from suspected tickborne disease as well as sepsis.  His anemia also has a chronic component.             If applicable, please update the problem list.     Patient: Mark Marie        : 1940  Account: [id number]           Admit Date:         How to Respond to this query:       a. Click New Note     b. Answer query within the yellow box.                c. Update the Problem List, if applicable.      If you have any questions about this query contact me at: [email or web address]     ,    Patient presents with sepsis with suspected ehrlichiosis and possible enteritis.  He is diagnosed with acute kidney injury and evaluated for anemia and thrombocytopenia due to bone marrow suppression, documented per Hematology to likely be due to acute illness.  Treatment includes IV fluids with bolus, and IV antibiotics.    Please specify if the patietn is treated for one or more of the following:    *Acute kidney injury secondary to sepsis  *Anemia and thrombocytopenia secondary to sepsis  *Other _________  *Unable to determine    By submitting this query, we are merely seeking further clarification of documentation to accurately reflect all conditions that you are monitoring, evaluating, treating or that extend the hospitalization or utilize additional resources of care. Please utilize your independent clinical judgment when addressing the question(s) above.     This query and your response, once completed, will be entered into the legal medical record.    Sincerely,  Bernei LUDWIG, RN  Clinical Documentation Integrity Program   [email or web address]

## 2023-06-12 NOTE — DISCHARGE SUMMARY
[facility] Medicine Services  Discharge Summary    Date of Service: 2023  Patient Name: Mark Marie  : 1940  MRN: [id number]    Date of Admission: 2023  Discharge Diagnosis: see below  Date of Discharge:  2023  Primary Care Physician: Diana Linares MD    Presenting Problem:   Dehydration [E86.0]  Thrombocytopenia [D69.6]  Bandemia [D72.825]  Acute kidney injury [N17.9]  Sepsis, due to unspecified organism, unspecified whether acute organ dysfunction present [A41.9]    Active and Resolved Hospital Problems:  Active Hospital Problems    Diagnosis POA   • Ehrlichiosis, unspecified [A77.40] Yes   • Hiccups [R06.6] Yes   • Obesity (BMI 30-39.9) [E66.9] Yes   • Thrombocytopenia [D69.6] Yes   • Elevated liver transaminase level [R74.01] Yes   • History of prostate cancer [Z85.46] Not Applicable   • Memory change [R41.3] Yes   • Hyperlipidemia [E78.5] Yes   • Hypertension [I10] Yes   • Hypothyroidism [E03.9] Yes      Resolved Hospital Problems    Diagnosis POA   • **Sepsis, due to unspecified organism, unspecified whether acute organ dysfunction present [A41.9] Yes   • Non-traumatic rhabdomyolysis [M62.82] Yes   • Febrile illness [R50.9] Yes   • Dysphagia [R13.10] Yes   • Acute kidney injury [N17.9] Yes   • Confusion [R41.0] Yes     Hospital Course     HPI:  This is a 82-year-old male who presents to the hospital on 2023 with complaints of worsening fever, chills, weakness, falls.  Patient says he has been feeling poorly for over a week but really started feeling bad on Friday.  Complains of an episode of nausea and diarrhea on Saturday.  Now having periodic episodes of difficulty breathing with the inability to swallow, hiccups and some congestion.  Has general abdominal discomfort but no overt abdominal pain.  Denies urinary symptoms.  [place] not aware of any recent tick bites but did recently take a tick off of his wife.  Denies any recent  travel or sick contacts.  Reported confusion at home and when first admitted.   Does have a history of early dementia    Hospital course:  He was admitted in consultation with hematology for further evaluation and treatment.  He responded well to IV fluids and antibiotics.  He was given doxycycline for empiric therapy for suspected ehrlichiosis and Zosyn and Flagyl for possible enteritis.  Blood cultures have remained negative.  Shobha-Barr virus and CMV screenings were negative for acute infection.  Lyme disease panel was negative.  Ehrlichiosis and [place] spotted fever panels are still pending.  His transaminitis remains but is stable.  His renal function has normalized.  He has bone marrow suppression seems to be improving.  His white blood cell count, which was low on admission, has normalized, and his platelet count, which was quite low on admission, has also normalized.  He remains anemic.  It sounds as though he has had problems with epigastric pain and hiccups for quite some time.  He does have a hiatal hernia which may have a sliding component contributing to his symptoms.  He was started on Thorazine for the hiccups, and this seems to have helped quite a bit.  He will continue this at discharge.  He remains quite debilitated and will discharge to rehab for a little while with close outpatient primary care follow-up.  He will complete a 2-week course of doxycycline, and we will discontinue antibiotics for enteritis after the 5 days or so he has received here.  He has not had any significant GI symptoms.    Day of Discharge     Vital Signs:  Temp:  [97.6 °F (36.4 °C)-98.1 °F (36.7 °C)] 97.6 °F (36.4 °C)  Heart Rate:  [79-94] 79  Resp:  [15-18] 15  BP: (121-153)/(73-84) 121/73    Physical Exam:  GEN: WDWN elderly gentleman, sitting in the bedside chair, looking better each day, no acute distress  HEENT: NCAT, PERRLA, moist mucous membranes  NECK: Supple, midline trachea  CARD: RRR, peripheral  edema  PULM: Fairly CTAB, diminished at the bases, non-distressed  ABD: soft, no abdominal tenderness to exam today, normoactive bowel sounds throughout  SKIN: Warm, dry, slight pallor  NEURO: Grossly intact, non-focal exam  PSYCH: Pleasant           Pertinent  and/or Most Recent Results     LAB RESULTS:      Lab 06/12/23  0500 06/11/23  0219 06/10/23  0531 06/09/23  0203 06/08/23  1218 06/07/23  2246 06/07/23  1639 06/07/23  1551   WBC 4.70 4.20 2.70* 2.10* 2.80* 2.90*   < >  --    HEMOGLOBIN 12.0* 11.9* 12.6* 12.0* 12.8* 12.7*   < >  --    HEMATOCRIT 36.0* 35.2* 37.4* 35.0* 38.3 37.9   < >  --    PLATELETS 185 142 86* 46* 38* 36*   < >  --    NEUTROS ABS 2.16 2.52 2.03 1.79 2.35 2.61   < >  --    EOS ABS 0.24 0.08 0.05 0.02  --   --   --   --    MCV 92.6 91.2 91.5 90.1 92.6 92.0   < >  --    SED RATE  --   --   --  8  --   --   --   --    CRP  --   --   --  3.80*  --   --   --   --    LACTATE  --   --   --   --   --   --   --  1.0   LDH  --   --   --   --   --  904*  --   --    PROTIME  --   --   --   --   --  12.0*  --   --     < > = values in this interval not displayed.         Lab 06/12/23  0500 06/11/23  0219 06/10/23  0531 06/09/23  0203 06/07/23  2246   SODIUM 148* 144 141 135* 138   POTASSIUM 3.8 3.7 3.8 3.2* 3.6   CHLORIDE 114* 113* 111* 108* 106   CO2 27.0 23.0 22.0 19.0* 22.0   ANION GAP 7.0 8.0 8.0 8.0 10.0   BUN 10 9 12 13 21   CREATININE 0.86 0.90 0.81 0.88 1.01   EGFR 87.5 86.3 89.1 86.9 75.2   GLUCOSE 97 111* 113* 146* 108*   CALCIUM 8.3* 8.0* 7.8* 7.1* 7.2*   MAGNESIUM  --   --  1.9 2.0  --          Lab 06/12/23  0500 06/11/23  0219 06/10/23  0531 06/09/23  0203 06/08/23  1218 06/07/23  2246   TOTAL PROTEIN 5.4* 5.1* 5.2* 4.8*  --  5.3*   ALBUMIN 2.5* 2.4* 2.4* 2.2* 2.4* 2.9*   GLOBULIN 2.9 2.7 2.8 2.6  --  2.4   ALT (SGPT) 99* 73* 49* 48*  --  49*   AST (SGOT) 176* 183* 144* 195*  --  220*   BILIRUBIN 0.3 0.3 0.2 0.3  --  0.4   ALK PHOS 73 64 44 42  --  43         Lab 06/07/23 2246   PROTIME  12.0*   INR 1.13*             Lab 06/08/23  1218   FOLATE >20.00   VITAMIN B 12 >2,000*         Brief Urine Lab Results  (Last result in the past 365 days)      Color   Clarity   Blood   Leuk Est   Nitrite   Protein   CREAT   Urine HCG        06/07/23 1725 Dark Yellow  Comment: Result checked     Clear   Large (3+)   Trace   Negative   >=300 mg/dL (3+)               Microbiology Results (last 10 days)     Procedure Component Value - Date/Time    Blood Culture - Blood, Arm, Right [[id number]]  (Normal) Collected: 06/07/23 1639    Lab Status: Preliminary result Specimen: Blood from Arm, Right Updated: 06/11/23 1645     Blood Culture No growth at 4 days    Narrative:      Less than seven (7) mL's of blood was collected.  Insufficient quantity may yield false negative results.    Respiratory Panel PCR w/COVID-19(SARS-CoV-2) [facility]UPINDER/NADEEM/CAMPOS/PAD/COR/MAD/ROBERTO In-House, NP Swab in UTM/VTM, 3-4 HR TAT - Swab, Nasopharynx [[id number]]  (Normal) Collected: 06/07/23 1556    Lab Status: Final result Specimen: Swab from Nasopharynx Updated: 06/07/23 1702     ADENOVIRUS, PCR Not Detected     Coronavirus 229E Not Detected     Coronavirus HKU1 Not Detected     Coronavirus NL63 Not Detected     Coronavirus OC43 Not Detected     COVID19 Not Detected     Human Metapneumovirus Not Detected     Human Rhinovirus/Enterovirus Not Detected     Influenza A PCR Not Detected     Influenza B PCR Not Detected     Parainfluenza Virus 1 Not Detected     Parainfluenza Virus 2 Not Detected     Parainfluenza Virus 3 Not Detected     Parainfluenza Virus 4 Not Detected     RSV, PCR Not Detected     Bordetella pertussis pcr Not Detected     Bordetella parapertussis PCR Not Detected     Chlamydophila pneumoniae PCR Not Detected     Mycoplasma pneumo by PCR Not Detected    Narrative:      In the setting of a positive respiratory panel with a viral infection PLUS a negative procalcitonin without other underlying concern for bacterial infection, consider observing  off antibiotics or discontinuation of antibiotics and continue supportive care. If the respiratory panel is positive for atypical bacterial infection (Bordetella pertussis, Chlamydophila pneumoniae, or Mycoplasma pneumoniae), consider antibiotic de-escalation to target atypical bacterial infection.    Blood Culture - Blood, Arm, Right [[id number]]  (Normal) Collected: 06/07/23 1553    Lab Status: Preliminary result Specimen: Blood from Arm, Right Updated: 06/11/23 1601     Blood Culture No growth at 4 days          CT Abdomen Pelvis Without Contrast    Result Date: 6/7/2023  Impression: 1.There are some mildly dilated loops of small bowel with mild wall thickening left upper quadrant. Question enteritis. 2.Diverticulosis without diverticulitis. 3.Small hiatal hernia. 4.Hepatomegaly. Electronically Signed: Norma Hathaway  6/7/2023 8:05 PM EDT  Workstation ID: [id number]    CT Head Without Contrast    Result Date: 6/7/2023  Impression: Impression: 1.No acute intracranial abnormality on head CT. Age-appropriate volume loss. Mild to moderate amount of low-density periventricular and subcortical white matter is most commonly seen with chronic small vessel ischemic change. 2.MRI is more sensitive to evaluate for acute or subacute infarct. Electronically Signed: Norma Hathaway  6/7/2023 5:19 PM EDT  Workstation ID: [id number]    XR Chest 1 View    Result Date: 6/7/2023  Impression: Impression: 1. No acute cardiopulmonary disease Electronically Signed: Joey Mckenzie  6/7/2023 4:22 PM EDT  Workstation ID: [id number]      Results for orders placed in visit on 09/03/19    Duplex Venous Lower Extremity - Right CAR    Interpretation Summary  · Normal right lower extremity venous duplex scan.  · Right popliteal fossa fluid collection.      Results for orders placed in visit on 09/03/19    Duplex Venous Lower Extremity - Right CAR    Interpretation Summary  · Normal right lower extremity venous duplex scan.  · Right popliteal fossa fluid  collection.    Labs Pending at Discharge:  Pending Labs     Order Current Status    Ehrlichia Profile DNA PCR In process    Methylmalonic Acid, Serum In process    Pathology Consultation In process    [facility] (IgG / M) In process    Blood Culture - Blood, Arm, Right Preliminary result    Blood Culture - Blood, Arm, Right Preliminary result        Consults:   Consults     Date and Time Order Name Status Description    6/8/2023 12:27 AM Hematology & Oncology Inpatient Consult Completed         Discharge Details        Discharge Medications      New Medications      Instructions Start Date   chlorproMAZINE 25 MG tablet  Commonly known as: THORAZINE   25 mg, Oral, 3 Times Daily      doxycycline 100 MG capsule  Commonly known as: VIBRAMYCIN   100 mg, Oral, 2 Times Daily      famotidine 20 MG tablet  Commonly known as: PEPCID   20 mg, Oral, 2 Times Daily Before Meals         Continue These Medications      Instructions Start Date   donepezil 5 MG tablet  Commonly known as: ARICEPT   TAKE 1 TABLET BY MOUTH EVERY DAY AT NIGHT      levothyroxine 25 MCG tablet  Commonly known as: SYNTHROID, LEVOTHROID   TAKE 1 TABLET BY MOUTH EVERY DAY      losartan 50 MG tablet  Commonly known as: COZAAR   TAKE 1 TABLET BY MOUTH EVERY DAY      metFORMIN 500 MG tablet  Commonly known as: GLUCOPHAGE   TAKE 1 TABLET BY MOUTH TWICE A DAY      promethazine 25 MG tablet  Commonly known as: PHENERGAN   25 mg, Oral, Every 6 Hours PRN      simvastatin 80 MG tablet  Commonly known as: ZOCOR   TAKE 1/2 TABLET BY MOUTH EVERY DAY      tamsulosin 0.4 MG capsule 24 hr capsule  Commonly known as: FLOMAX   1 capsule, Oral, Daily      traMADol 50 MG tablet  Commonly known as: ULTRAM   TAKE 1 TABLET BY MOUTH EVERY 6 HOURS AS NEEDED FOR MODERATE PAIN             Allergies   Allergen Reactions   • Ramipril Swelling     Leg swelling     Discharge Disposition:   Skilled Nursing Facility (DC - External)    Diet:  Hospital:  Diet Order   Procedures   •  Diet: Regular/House Diet; Texture: Regular Texture (IDDSI 7); Fluid Consistency: Thin (IDDSI 0)     Discharge Activity:   Activity Instructions     Activity as Tolerated      Gradually Increase Activity Until at Pre-Hospitalization Level          CODE STATUS:  Code Status and Medical Interventions:   Ordered at: 06/08/23 8925     Level Of Support Discussed With:    Patient     Code Status (Patient has no pulse and is not breathing):    CPR (Attempt to Resuscitate)     Medical Interventions (Patient has pulse or is breathing):    Full Support     Release to patient:    Routine Release     No future appointments.    Additional Instructions for the Follow-ups that You Need to Schedule     Call MD With Problems / Concerns   As directed      Instructions: PCM    Order Comments: Instructions: PCM          Discharge Follow-up with PCP   As directed       Currently Documented PCP:    Diana Linares MD    PCP Phone Number:    [phone]     Follow Up Details: within one week of discharge         Discharge Follow-up with Specialty: Hematology follow up as per their service   As directed      Specialty: Hematology follow up as per their service             Time spent on Discharge including face to face service: 35 minutes    Signature: Electronically signed by Thang Clayton MD, 06/12/23, 08:07 EDT.  [facility] Hospitalist Team

## 2023-06-13 LAB
Lab: NORMAL
METHYLMALONATE SERPL-SCNC: 185 NMOL/L (ref 0–378)

## 2023-06-14 LAB
A PHAGOCYTOPH DNA BLD QL NAA+PROBE: NEGATIVE
E CHAFFEENSIS DNA BLD QL NAA+PROBE: NEGATIVE
R RICKETTSI IGG SER QL IA: POSITIVE
R RICKETTSI IGG TITR SER IF: NORMAL {TITER}
R RICKETTSI IGM SER-ACNC: 0.34 INDEX (ref 0–0.89)

## 2023-06-15 DIAGNOSIS — D69.6 THROMBOCYTOPENIA: Primary | ICD-10-CM

## 2023-06-20 LAB — PLASMA CELL MYELOMA TARGETGENE PANEL RESULT: NORMAL

## 2023-06-25 LAB — CCV RESULT: NORMAL

## 2023-07-21 ENCOUNTER — OFFICE VISIT (OUTPATIENT)
Dept: FAMILY MEDICINE CLINIC | Facility: CLINIC | Age: 83
End: 2023-07-21
Payer: MEDICARE

## 2023-07-21 ENCOUNTER — LAB (OUTPATIENT)
Dept: FAMILY MEDICINE CLINIC | Facility: CLINIC | Age: 83
End: 2023-07-21
Payer: MEDICARE

## 2023-07-21 VITALS
SYSTOLIC BLOOD PRESSURE: 136 MMHG | HEART RATE: 76 BPM | DIASTOLIC BLOOD PRESSURE: 84 MMHG | TEMPERATURE: 98.2 F | HEIGHT: 64 IN | OXYGEN SATURATION: 97 % | BODY MASS INDEX: 32.68 KG/M2 | WEIGHT: 191.4 LBS

## 2023-07-21 DIAGNOSIS — A77.0 RMSF (ROCKY MOUNTAIN SPOTTED FEVER): Primary | ICD-10-CM

## 2023-07-21 DIAGNOSIS — E11.8 DISORDER ASSOCIATED WITH TYPE 2 DIABETES MELLITUS: ICD-10-CM

## 2023-07-21 LAB
ALBUMIN SERPL-MCNC: 4.2 G/DL (ref 3.5–5.2)
ALBUMIN/GLOB SERPL: 2 G/DL
ALP SERPL-CCNC: 62 U/L (ref 39–117)
ALT SERPL W P-5'-P-CCNC: 11 U/L (ref 1–41)
ANION GAP SERPL CALCULATED.3IONS-SCNC: 7 MMOL/L (ref 5–15)
AST SERPL-CCNC: 14 U/L (ref 1–40)
BASOPHILS # BLD AUTO: 0.02 10*3/MM3 (ref 0–0.2)
BASOPHILS NFR BLD AUTO: 0.5 % (ref 0–1.5)
BILIRUB SERPL-MCNC: 0.3 MG/DL (ref 0–1.2)
BUN SERPL-MCNC: 14 MG/DL (ref 8–23)
BUN/CREAT SERPL: 15.9 (ref 7–25)
CALCIUM SPEC-SCNC: 9.6 MG/DL (ref 8.6–10.5)
CHLORIDE SERPL-SCNC: 105 MMOL/L (ref 98–107)
CO2 SERPL-SCNC: 29 MMOL/L (ref 22–29)
CREAT SERPL-MCNC: 0.88 MG/DL (ref 0.76–1.27)
DEPRECATED RDW RBC AUTO: 42.6 FL (ref 37–54)
EGFRCR SERPLBLD CKD-EPI 2021: 86.9 ML/MIN/1.73
EOSINOPHIL # BLD AUTO: 0.23 10*3/MM3 (ref 0–0.4)
EOSINOPHIL NFR BLD AUTO: 5.2 % (ref 0.3–6.2)
ERYTHROCYTE [DISTWIDTH] IN BLOOD BY AUTOMATED COUNT: 13 % (ref 12.3–15.4)
GLOBULIN UR ELPH-MCNC: 2.1 GM/DL
GLUCOSE SERPL-MCNC: 117 MG/DL (ref 65–99)
HBA1C MFR BLD: 6 % (ref 4.8–5.6)
HCT VFR BLD AUTO: 38 % (ref 37.5–51)
HGB BLD-MCNC: 12.8 G/DL (ref 13–17.7)
IMM GRANULOCYTES # BLD AUTO: 0.01 10*3/MM3 (ref 0–0.05)
IMM GRANULOCYTES NFR BLD AUTO: 0.2 % (ref 0–0.5)
LYMPHOCYTES # BLD AUTO: 0.37 10*3/MM3 (ref 0.7–3.1)
LYMPHOCYTES NFR BLD AUTO: 8.4 % (ref 19.6–45.3)
MCH RBC QN AUTO: 30.6 PG (ref 26.6–33)
MCHC RBC AUTO-ENTMCNC: 33.7 G/DL (ref 31.5–35.7)
MCV RBC AUTO: 90.9 FL (ref 79–97)
MONOCYTES # BLD AUTO: 0.48 10*3/MM3 (ref 0.1–0.9)
MONOCYTES NFR BLD AUTO: 10.9 % (ref 5–12)
NEUTROPHILS NFR BLD AUTO: 3.3 10*3/MM3 (ref 1.7–7)
NEUTROPHILS NFR BLD AUTO: 74.8 % (ref 42.7–76)
NRBC BLD AUTO-RTO: 0 /100 WBC (ref 0–0.2)
PLATELET # BLD AUTO: 264 10*3/MM3 (ref 140–450)
PMV BLD AUTO: 9.3 FL (ref 6–12)
POTASSIUM SERPL-SCNC: 5.1 MMOL/L (ref 3.5–5.2)
PROT SERPL-MCNC: 6.3 G/DL (ref 6–8.5)
RBC # BLD AUTO: 4.18 10*6/MM3 (ref 4.14–5.8)
SODIUM SERPL-SCNC: 141 MMOL/L (ref 136–145)
WBC NRBC COR # BLD: 4.41 10*3/MM3 (ref 3.4–10.8)

## 2023-07-21 PROCEDURE — 85025 COMPLETE CBC W/AUTO DIFF WBC: CPT | Performed by: FAMILY MEDICINE

## 2023-07-21 PROCEDURE — 83036 HEMOGLOBIN GLYCOSYLATED A1C: CPT | Performed by: FAMILY MEDICINE

## 2023-07-21 PROCEDURE — 80053 COMPREHEN METABOLIC PANEL: CPT | Performed by: FAMILY MEDICINE

## 2023-07-21 PROCEDURE — 36415 COLL VENOUS BLD VENIPUNCTURE: CPT | Performed by: FAMILY MEDICINE

## 2023-07-21 RX ORDER — DOXYCYCLINE 100 MG/1
100 CAPSULE ORAL 2 TIMES DAILY
Qty: 20 CAPSULE | Refills: 1 | Status: SHIPPED | OUTPATIENT
Start: 2023-07-21

## 2023-07-21 RX ORDER — TRIAMCINOLONE ACETONIDE 1 MG/G
1 CREAM TOPICAL 2 TIMES DAILY
Qty: 80 G | Refills: 1 | Status: SHIPPED | OUTPATIENT
Start: 2023-07-21

## 2023-07-21 NOTE — PROGRESS NOTES
Transitional Care Follow Up Visit  Subjective     Mark Marie is a 82 y.o. male who presents for a transitional care management visit.    Within 48 business hours after discharge our office contacted him via telephone to coordinate his care and needs.      I reviewed and discussed the details of that call along with the discharge summary, hospital problems, inpatient lab results, inpatient diagnostic studies, and consultation reports with Mark.     Current outpatient and discharge medications have been reconciled for the patient.  Reviewed by: Diana Linares MD           No data to display              Risk for Readmission (LACE)   No data recorded    History of Present Illness   Course During Hospital Stay:  This is a 82-year-old male who presented to the hospital on 6/7/2023 with complaints of worsening fever, chills, weakness, falls.  Patient says he has been feeling poorly for over a week but really started feeling bad on Friday 6/2/23.  Complained of an episode of nausea and diarrhea on Saturday, 6/3/23.  Had periodic episodes of difficulty breathing with the inability to swallow, hiccups and some congestion.  Has general abdominal discomfort but no overt abdominal pain.  Denies urinary symptoms.  [place] not aware of any recent tick bites but did recently take a tick off of his wife.  Denies any recent travel or sick contacts.  Reported confusion at home and when first admitted.       He was admitted in consultation with hematology for further evaluation and treatment.  He responded well to IV fluids and antibiotics.  He was given doxycycline for empiric therapy for suspected ehrlichiosis and Zosyn and Flagyl for possible enteritis.  Blood cultures remained negative.  Shobha-Barr virus and CMV screenings were negative for acute infection.  Lyme disease panel was negative.  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  His  transaminitis remains but is improving.  His renal function has normalized.  He has bone marrow suppression seems to be improving.  His white blood cell count, which was low on admission, has normalized, and his platelet count, which was quite low on admission, has also normalized.  He remains anemic.  It sounds as though he has had problems with epigastric pain and hiccups for quite some time.  He does have a hiatal hernia which may have a sliding component contributing to his symptoms.  He was started on Thorazine for the hiccups, and this seems to have helped quite a bit.  He  continued this at discharge.  He remained quite debilitated and was discharged to rehab. He completed a 2-week course of doxycycline, and we will discontinue antibiotics for enteritis after the 5 days or so he has received here.  He has not had any significant GI symptoms.    He spent approximately 3 weeks in inpatient rehab with gradual improvement in strength and stamina.    Since coming home from rehab he has gradually improved but still has fatigue.       The following portions of the patient's history were reviewed and updated as appropriate: allergies, current medications, past family history, past medical history, past social history, past surgical history, and problem list.    Review of Systems    Objective   Physical Exam  Vitals and nursing note reviewed. Exam conducted with a chaperone present.   Constitutional:       General: He is not in acute distress.     Appearance: He is well-developed.   HENT:      Head: Normocephalic.   Eyes:      General: Lids are normal.      Conjunctiva/sclera: Conjunctivae normal.   Neck:      Thyroid: No thyroid mass or thyromegaly.      Trachea: Trachea normal.   Cardiovascular:      Rate and Rhythm: Normal rate and regular rhythm.      Heart sounds: Normal heart sounds.   Pulmonary:      Effort: Pulmonary effort is normal.      Breath sounds: Normal breath sounds.   Musculoskeletal:      Cervical  back: Normal range of motion.      Right lower leg: No edema.      Left lower leg: No edema.   Lymphadenopathy:      Cervical: No cervical adenopathy.   Skin:     General: Skin is warm and dry.   Neurological:      Mental Status: He is alert and oriented to person, place, and time.   Psychiatric:         Attention and Perception: He is attentive.         Mood and Affect: Mood normal.         Speech: Speech normal.         Behavior: Behavior normal.       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  Dispense: 20 capsule; Refill: 1  -     triamcinolone (KENALOG) 0.1 % cream; Apply 1 application  topically to the appropriate area as directed 2 (Two) Times a Day.  Dispense: 80 g; Refill: 1

## 2023-08-04 NOTE — PROGRESS NOTES
Hematology/Oncology Outpatient Follow Up    PATIENT NAME:Mark Marie  :1940  MRN: [id number]  PRIMARY CARE PHYSICIAN: Diana Linares MD  REFERRING PHYSICIAN: No ref. provider found    Chief Complaint   Patient presents with    Follow-up     Thrombocytopenia          HISTORY OF PRESENT ILLNESS:       Mark Marie is a 83 y.o. male who presented to [facility] on 2023 at the direction of his primary care physician with complaints of fever, chills, weakness and falls.  Past medical history of type 2 diabetes mellitus, hypertension, hyperlipidemia, hypothyroidism, dementia prostate cancer in , squamous cell carcinoma of skin.  Patient reported he had been feeling poorly for over a week.  In addition to fever and chills he also reported nausea and diarrhea.  Intermittent episodes of dysphagia, difficulty breathing, hiccups and congestion.  Denied any abdominal pain or urinary symptoms.     Evaluation in the ED: WBC 3.90, hemoglobin 15 g/dL, MCV 91.8, platelets 43,000, bands 22%, lymphocytes 5%, plasma cells 1%.  BUN 27, creatinine 1.29, , ALT 53, negative respiratory panel, chest x-ray showing no acute cardiopulmonary disease, blood cultures drawn and pending, CT of the head without contrast showing no acute intracranial abnormality; age-appropriate volume loss; mild to moderate amount of low-density periventricular and subcortical white matter most commonly seen with chronic small vessel ischemic change.  UA positive for large amount of blood, trace leukocytes CT of the abdomen and pelvis without contrast showed possible enteritis, diverticulosis without diverticulitis, small hiatal hernia, hepatomegaly.  PT 12.0, INR 1.13.     23  Hematology/Oncology was consulted for acute thrombocytopenia.  Patient had platelets presented with platelet count of 43,000 with bandemia.  He also has had a febrile illness no obvious source at this time..  Patient has a history of underlying mild  dementia     He/She  has a past medical history of Cataract (removed 16 years ago), Diabetes mellitus, HL (hearing loss) (have worn hearing aids for 16 years), Hyperlipidemia, Hypertension, Hypothyroidism, Joint pain, Low back pain, Prostate cancer (2011), and Squamous cell carcinoma of skin.     PCP: Diana Linares MD    Past Medical History:   Diagnosis Date    Cataract removed 16 years ago    Diabetes mellitus     Ehrlichiosis, unspecified 6/12/2023    Hiccups 6/12/2023    HL (hearing loss) have worn hearing aids for 16 years    Hyperlipidemia     Hypertension     Hypothyroidism     Joint pain     Low back pain     Non-traumatic rhabdomyolysis 06/09/2023    Obesity (BMI 30-39.9) 6/12/2023    Prostate cancer 2011    Squamous cell carcinoma of skin     right temple       Past Surgical History:   Procedure Laterality Date    HEMORROIDECTOMY      HERNIA REPAIR      inguinal and umbilical    KNEE SURGERY Bilateral     trimmed cartilage    VASECTOMY  50 years ago         Current Outpatient Medications:     donepezil (ARICEPT) 5 MG tablet, TAKE 1 TABLET BY MOUTH EVERY DAY AT NIGHT, Disp: 90 tablet, Rfl: 3    doxycycline (MONODOX) 100 MG capsule, Take 1 capsule by mouth 2 (Two) Times a Day., Disp: 20 capsule, Rfl: 1    levothyroxine (SYNTHROID, LEVOTHROID) 25 MCG tablet, TAKE 1 TABLET BY MOUTH EVERY DAY, Disp: 90 tablet, Rfl: 3    losartan (COZAAR) 50 MG tablet, TAKE 1 TABLET BY MOUTH EVERY DAY, Disp: 90 tablet, Rfl: 3    metFORMIN (GLUCOPHAGE) 500 MG tablet, TAKE 1 TABLET BY MOUTH TWICE A DAY, Disp: 180 tablet, Rfl: 3    simvastatin (ZOCOR) 80 MG tablet, TAKE 1/2 TABLET BY MOUTH EVERY DAY, Disp: 45 tablet, Rfl: 7    tamsulosin (FLOMAX) 0.4 MG capsule 24 hr capsule, Take 1 capsule by mouth Daily., Disp: , Rfl:     traMADol (ULTRAM) 50 MG tablet, TAKE 1 TABLET BY MOUTH EVERY 6 HOURS AS NEEDED FOR MODERATE PAIN, Disp: 120 tablet, Rfl: 0    triamcinolone (KENALOG) 0.1 % cream, Apply 1 application  topically to the  "appropriate area as directed 2 (Two) Times a Day., Disp: 80 g, Rfl: 1    Allergies   Allergen Reactions    Ramipril Swelling     Leg swelling       Family History   Problem Relation Age of Onset    Heart disease Father     Hypertension Father     Hypertension Mother     Other Mother         Alzheimers    Cancer Brother         prostate    Cancer Brother         prostate    Hypertension Brother     Cancer Daughter        Cancer-related family history includes Cancer in his brother, brother, and daughter.    Social History     Tobacco Use    Smoking status: Never    Smokeless tobacco: Never   Vaping Use    Vaping Use: Never used   Substance Use Topics    Alcohol use: Not Currently     Comment: rarely;  caffeine 1c qd    Drug use: No     I have reviewed and confirmed the accuracy of the patient's history: Chief complaint, HPI, ROS, and Subjective as entered by the MA/LPN/RN. Hallie Valencia MD 08/07/23      SUBJECTIVE:    Continues to improve clinically.  Has been discharged from rehab.  He is accompanied today by his spouse for this appointment        REVIEW OF SYSTEMS:    Review of Systems   Constitutional:  Negative for chills, fatigue and fever.   Gastrointestinal:  Negative for anal bleeding.   Genitourinary:  Negative for hematuria.   Neurological:  Positive for weakness.   Hematological:  Does not bruise/bleed easily.     OBJECTIVE:    Vitals:    08/07/23 1143   BP: 138/79   Pulse: 62   Resp: 18   Temp: 98 øF (36.7 øC)   TempSrc: Infrared   SpO2: 98%   Weight: 85.7 kg (189 lb)   Height: 162.6 cm (64\")   PainSc: 0-No pain     Body mass index is 32.44 kg/mý.    ECOG  (2) Ambulatory and capable of self care, unable to carry out work activity, up and about > 50% or waking hours    Physical Exam  Vitals reviewed.   Constitutional:       General: He is not in acute distress.     Appearance: Normal appearance. He is normal weight.   HENT:      Head: Normocephalic and atraumatic.   Eyes:      Extraocular " Movements: Extraocular movements intact.   Cardiovascular:      Rate and Rhythm: Normal rate.      Heart sounds: No murmur heard.  Pulmonary:      Effort: Pulmonary effort is normal. No respiratory distress.   Abdominal:      Palpations: Abdomen is soft.   Musculoskeletal:         General: Normal range of motion.      Cervical back: Normal range of motion.   Lymphadenopathy:      Cervical: No cervical adenopathy.   Skin:     General: Skin is warm.      Findings: Bruising (Resolving bruise to the right upper extremity inner forearm) present.   Neurological:      Mental Status: He is alert and oriented to person, place, and time.   Psychiatric:         Mood and Affect: Mood normal.         Behavior: Behavior normal.       I have reexamined the patient and the results are consistent with the previously documented exam. Hallie Valencia MD       RECENT LABS  WBC   Date Value Ref Range Status   08/07/2023 4.75 3.40 - 10.80 10*3/mm3 Final     RBC   Date Value Ref Range Status   08/07/2023 4.24 4.14 - 5.80 10*6/mm3 Final     Hemoglobin   Date Value Ref Range Status   08/07/2023 13.2 13.0 - 17.7 g/dL Final     Hematocrit   Date Value Ref Range Status   08/07/2023 39.7 37.5 - 51.0 % Final     MCV   Date Value Ref Range Status   08/07/2023 93.6 79.0 - 97.0 fL Final     MCH   Date Value Ref Range Status   08/07/2023 31.1 26.6 - 33.0 pg Final     MCHC   Date Value Ref Range Status   08/07/2023 33.2 31.5 - 35.7 g/dL Final     RDW   Date Value Ref Range Status   08/07/2023 13.3 12.3 - 15.4 % Final     RDW-SD   Date Value Ref Range Status   08/07/2023 43.5 37.0 - 54.0 fl Final     MPV   Date Value Ref Range Status   08/07/2023 9.0 6.0 - 12.0 fL Final     Platelets   Date Value Ref Range Status   08/07/2023 244 140 - 450 10*3/mm3 Final     Neutrophil %   Date Value Ref Range Status   08/07/2023 69.1 42.7 - 76.0 % Final     Lymphocyte %   Date Value Ref Range Status   08/07/2023 12.6 (L) 19.6 - 45.3 % Final     Monocyte %    Date Value Ref Range Status   08/07/2023 10.3 5.0 - 12.0 % Final     Eosinophil %   Date Value Ref Range Status   08/07/2023 7.6 (H) 0.3 - 6.2 % Final     Basophil %   Date Value Ref Range Status   08/07/2023 0.4 0.0 - 1.5 % Final     Immature Grans %   Date Value Ref Range Status   07/21/2023 0.2 0.0 - 0.5 % Final     Neutrophils, Absolute   Date Value Ref Range Status   08/07/2023 3.28 1.70 - 7.00 10*3/mm3 Final     Lymphocytes, Absolute   Date Value Ref Range Status   08/07/2023 0.60 (L) 0.70 - 3.10 10*3/mm3 Final     Monocytes, Absolute   Date Value Ref Range Status   08/07/2023 0.49 0.10 - 0.90 10*3/mm3 Final     Eosinophils, Absolute   Date Value Ref Range Status   08/07/2023 0.36 0.00 - 0.40 10*3/mm3 Final     Basophils, Absolute   Date Value Ref Range Status   08/07/2023 0.02 0.00 - 0.20 10*3/mm3 Final     Immature Grans, Absolute   Date Value Ref Range Status   07/21/2023 0.01 0.00 - 0.05 10*3/mm3 Final     nRBC   Date Value Ref Range Status   07/21/2023 0.0 0.0 - 0.2 /100 WBC Final       Lab Results   Component Value Date    GLUCOSE 117 (H) 07/21/2023    BUN 14 07/21/2023    CREATININE 0.88 07/21/2023    EGFRIFNONA 76 09/20/2021    BCR 15.9 07/21/2023    K 5.1 07/21/2023    CO2 29.0 07/21/2023    CALCIUM 9.6 07/21/2023    PROTENTOTREF 6.6 06/23/2023    ALBUMIN 4.2 07/21/2023    LABIL2 1.1 06/23/2023    AST 14 07/21/2023    ALT 11 07/21/2023         ASSESSMENT:    Acute thrombocytopenia: Resolved while in the hospital.  Patient with a platelet count of 43,000 upon presentation to the hospital.  Work-up was negative for nutritional deficiency or hemolysis.  He had a very elevated LDH and a low reticulocyte count suggesting bone marrow suppression.  Peripheral smear was negative for blasts.  No M spike noted on SPEP.  Overall counts were recovering very well.  He was noted to have up to 14% blasts on his differential and therefore we sent his blood out for flow cytometry which showed circulating monoclonal  plasma cells 0.3% of leukocytes his subsequent FISH testing was normal.  CBC reviewed  [place] spotted fever: Febrile illness: Resolved on IV doxycycline.    Weakness and deconditioning: Has completed physical therapy improving  History of prostate cancer in 2011, history of squamous cell carcinoma of the skin  Chronic conditions: Type 2 diabetes mellitus, hyperlipidemia, hypertension, hypothyroidism    PLAN:     Reviewed his CBC today  Hematologically he has recovered  Follow-up general 2024 with CBC CMP flow cytometry, peripheral smear serum free light chains SPEP with SINDY and quantitative immunoglobulins a week before the appointment  Follow-up 4 months  All questions answered         I spent 30 total minutes, face-to-face, caring for Mark today. 90% of this time involved counseling and/or coordination of care as documented within this note.            Patient verbalized understanding and is in agreement of the above plan.

## 2023-08-07 ENCOUNTER — OFFICE VISIT (OUTPATIENT)
Dept: ONCOLOGY | Facility: CLINIC | Age: 83
End: 2023-08-07
Payer: MEDICARE

## 2023-08-07 ENCOUNTER — LAB (OUTPATIENT)
Dept: LAB | Facility: HOSPITAL | Age: 83
End: 2023-08-07
Payer: MEDICARE

## 2023-08-07 VITALS
HEART RATE: 62 BPM | TEMPERATURE: 98 F | RESPIRATION RATE: 18 BRPM | HEIGHT: 64 IN | OXYGEN SATURATION: 98 % | BODY MASS INDEX: 32.27 KG/M2 | WEIGHT: 189 LBS | DIASTOLIC BLOOD PRESSURE: 79 MMHG | SYSTOLIC BLOOD PRESSURE: 138 MMHG

## 2023-08-07 DIAGNOSIS — D69.6 THROMBOCYTOPENIA: Primary | ICD-10-CM

## 2023-08-07 LAB
BASOPHILS # BLD AUTO: 0.02 10*3/MM3 (ref 0–0.2)
BASOPHILS NFR BLD AUTO: 0.4 % (ref 0–1.5)
DEPRECATED RDW RBC AUTO: 43.5 FL (ref 37–54)
EOSINOPHIL # BLD AUTO: 0.36 10*3/MM3 (ref 0–0.4)
EOSINOPHIL NFR BLD AUTO: 7.6 % (ref 0.3–6.2)
ERYTHROCYTE [DISTWIDTH] IN BLOOD BY AUTOMATED COUNT: 13.3 % (ref 12.3–15.4)
HCT VFR BLD AUTO: 39.7 % (ref 37.5–51)
HGB BLD-MCNC: 13.2 G/DL (ref 13–17.7)
HOLD SPECIMEN: NORMAL
HOLD SPECIMEN: NORMAL
LYMPHOCYTES # BLD AUTO: 0.6 10*3/MM3 (ref 0.7–3.1)
LYMPHOCYTES NFR BLD AUTO: 12.6 % (ref 19.6–45.3)
MCH RBC QN AUTO: 31.1 PG (ref 26.6–33)
MCHC RBC AUTO-ENTMCNC: 33.2 G/DL (ref 31.5–35.7)
MCV RBC AUTO: 93.6 FL (ref 79–97)
MONOCYTES # BLD AUTO: 0.49 10*3/MM3 (ref 0.1–0.9)
MONOCYTES NFR BLD AUTO: 10.3 % (ref 5–12)
NEUTROPHILS NFR BLD AUTO: 3.28 10*3/MM3 (ref 1.7–7)
NEUTROPHILS NFR BLD AUTO: 69.1 % (ref 42.7–76)
PLATELET # BLD AUTO: 244 10*3/MM3 (ref 140–450)
PMV BLD AUTO: 9 FL (ref 6–12)
RBC # BLD AUTO: 4.24 10*6/MM3 (ref 4.14–5.8)
WBC NRBC COR # BLD: 4.75 10*3/MM3 (ref 3.4–10.8)

## 2023-08-07 PROCEDURE — 85025 COMPLETE CBC W/AUTO DIFF WBC: CPT

## 2023-08-07 PROCEDURE — 99214 OFFICE O/P EST MOD 30 MIN: CPT | Performed by: INTERNAL MEDICINE

## 2023-08-07 PROCEDURE — 36415 COLL VENOUS BLD VENIPUNCTURE: CPT

## 2023-08-07 PROCEDURE — 1126F AMNT PAIN NOTED NONE PRSNT: CPT | Performed by: INTERNAL MEDICINE

## 2023-08-07 PROCEDURE — 3078F DIAST BP <80 MM HG: CPT | Performed by: INTERNAL MEDICINE

## 2023-08-07 PROCEDURE — 3075F SYST BP GE 130 - 139MM HG: CPT | Performed by: INTERNAL MEDICINE

## 2023-09-30 DIAGNOSIS — M54.50 CHRONIC BILATERAL LOW BACK PAIN WITHOUT SCIATICA: ICD-10-CM

## 2023-09-30 DIAGNOSIS — G89.29 CHRONIC BILATERAL LOW BACK PAIN WITHOUT SCIATICA: ICD-10-CM

## 2023-10-02 RX ORDER — TRAMADOL HYDROCHLORIDE 50 MG/1
TABLET ORAL
Qty: 120 TABLET | Refills: 0 | Status: SHIPPED | OUTPATIENT
Start: 2023-10-02

## 2023-10-09 ENCOUNTER — CLINICAL SUPPORT (OUTPATIENT)
Dept: FAMILY MEDICINE CLINIC | Facility: CLINIC | Age: 83
End: 2023-10-09
Payer: MEDICARE

## 2023-10-09 DIAGNOSIS — Z23 NEED FOR VACCINATION: Primary | ICD-10-CM

## 2023-10-09 NOTE — PROGRESS NOTES
Patient came in the office for High Dose Flu vaccine. Patient tolerated injection in right deltoid well.

## 2023-10-31 ENCOUNTER — TELEPHONE (OUTPATIENT)
Dept: FAMILY MEDICINE CLINIC | Facility: CLINIC | Age: 83
End: 2023-10-31

## 2023-10-31 RX ORDER — ONDANSETRON 4 MG/1
4 TABLET, ORALLY DISINTEGRATING ORAL EVERY 8 HOURS PRN
Qty: 20 TABLET | Refills: 1 | Status: SHIPPED | OUTPATIENT
Start: 2023-10-31

## 2023-10-31 NOTE — TELEPHONE ENCOUNTER
Caller: Mark Marie    Relationship: Self    Best call back number: [phone]     What medication are you requesting: ZOFRAN    What are your current symptoms: NAUSEA    How long have you been experiencing symptoms:     Have you had these symptoms before:    [] Yes  [] No    Have you been treated for these symptoms before:   [] Yes  [] No    If a prescription is needed, what is your preferred pharmacy and phone number: [id number] - [address] AT [place] [address] - [phone] [facility] [phone]      Additional notes: LEAVING OUT OF COUNTRY FOR 2 WEEKS ON THURSDAY.

## 2023-11-20 ENCOUNTER — OFFICE VISIT (OUTPATIENT)
Dept: FAMILY MEDICINE CLINIC | Facility: CLINIC | Age: 83
End: 2023-11-20
Payer: MEDICARE

## 2023-11-20 VITALS
WEIGHT: 197.2 LBS | HEIGHT: 64 IN | OXYGEN SATURATION: 95 % | DIASTOLIC BLOOD PRESSURE: 84 MMHG | HEART RATE: 82 BPM | BODY MASS INDEX: 33.67 KG/M2 | SYSTOLIC BLOOD PRESSURE: 137 MMHG | TEMPERATURE: 98.2 F

## 2023-11-20 DIAGNOSIS — R09.81 NASAL CONGESTION: ICD-10-CM

## 2023-11-20 DIAGNOSIS — J40 BRONCHITIS: Primary | ICD-10-CM

## 2023-11-20 LAB
EXPIRATION DATE: NORMAL
FLUAV AG UPPER RESP QL IA.RAPID: NOT DETECTED
FLUBV AG UPPER RESP QL IA.RAPID: NOT DETECTED
INTERNAL CONTROL: NORMAL
Lab: NORMAL
SARS-COV-2 AG UPPER RESP QL IA.RAPID: NOT DETECTED

## 2023-11-20 PROCEDURE — 3079F DIAST BP 80-89 MM HG: CPT | Performed by: FAMILY MEDICINE

## 2023-11-20 PROCEDURE — 99213 OFFICE O/P EST LOW 20 MIN: CPT | Performed by: FAMILY MEDICINE

## 2023-11-20 PROCEDURE — 3075F SYST BP GE 130 - 139MM HG: CPT | Performed by: FAMILY MEDICINE

## 2023-11-20 PROCEDURE — 87428 SARSCOV & INF VIR A&B AG IA: CPT | Performed by: FAMILY MEDICINE

## 2023-11-20 RX ORDER — GUAIFENESIN AND CODEINE PHOSPHATE 100; 10 MG/5ML; MG/5ML
5 SOLUTION ORAL 3 TIMES DAILY PRN
Qty: 120 ML | Refills: 0 | Status: SHIPPED | OUTPATIENT
Start: 2023-11-20

## 2023-11-20 RX ORDER — AMOXICILLIN 500 MG/1
500 CAPSULE ORAL 3 TIMES DAILY
Qty: 30 CAPSULE | Refills: 0 | Status: SHIPPED | OUTPATIENT
Start: 2023-11-20

## 2023-11-20 NOTE — PROGRESS NOTES
"Chief Complaint  Nasal Congestion    Subjective        Mark Marie presents to [facility]  History of Present Illness  He just returned from a 15 day [employer] cruise.    URI   This is a new problem. The current episode started yesterday. The maximum temperature recorded prior to his arrival was 100.4 - 100.9 F. Associated symptoms include congestion, coughing and rhinorrhea. Pertinent negatives include no chest pain, ear pain or nausea. Associated symptoms comments: hiccups. He has tried nothing for the symptoms.       Objective   Vital Signs:  /84 (BP Location: Left arm, Patient Position: Sitting, Cuff Size: Large Adult)   Pulse 82   Temp 98.2 °F (36.8 °C) (Infrared)   Ht 162.6 cm (64\")   Wt 89.4 kg (197 lb 3.2 oz)   SpO2 95%   BMI 33.85 kg/m²   Estimated body mass index is 33.85 kg/m² as calculated from the following:    Height as of this encounter: 162.6 cm (64\").    Weight as of this encounter: 89.4 kg (197 lb 3.2 oz).               Physical Exam  Vitals and nursing note reviewed.   Constitutional:       General: He is not in acute distress.     Appearance: He is well-developed.   HENT:      Head: Normocephalic.      Right Ear: Tympanic membrane normal.      Left Ear: Tympanic membrane normal.      Mouth/Throat:      Mouth: Mucous membranes are moist.   Eyes:      General: Lids are normal.      Conjunctiva/sclera: Conjunctivae normal.   Neck:      Thyroid: No thyroid mass or thyromegaly.      Trachea: Trachea normal.   Cardiovascular:      Rate and Rhythm: Normal rate and regular rhythm.      Heart sounds: Normal heart sounds.   Pulmonary:      Effort: Pulmonary effort is normal.      Breath sounds: Rhonchi present.   Musculoskeletal:      Cervical back: Normal range of motion.   Lymphadenopathy:      Cervical: No cervical adenopathy.   Skin:     General: Skin is warm and dry.   Neurological:      Mental Status: He is alert and oriented to person, place, and " time.   Psychiatric:         Attention and Perception: He is attentive.         Mood and Affect: Mood normal.         Speech: Speech normal.         Behavior: Behavior normal.        Result Review :  The following data was reviewed by: Diana Linares MD on 11/20/2023:  Common labs          6/23/2023    09:59 7/21/2023    11:11 8/7/2023    11:22   Common Labs   Glucose 82  117     BUN 16  14     Creatinine 1.09  0.88     Sodium 141  141     Potassium 4.2  5.1     Chloride 102  105     Calcium 9.9  9.6     Total Protein 6.6      Albumin 3.4     3.8  4.2     Total Bilirubin 0.4  0.3     Alkaline Phosphatase 66  62     AST (SGOT) 15  14     ALT (SGPT) 18  11     WBC 4.70  4.41  4.75    Hemoglobin 13.1  12.8  13.2    Hematocrit 38.9  38.0  39.7    Platelets 283  264  244    Hemoglobin A1C  6.00                    Assessment and Plan   Diagnoses and all orders for this visit:    1. Bronchitis (Primary)  -     guaiFENesin-codeine (GUAIFENESIN AC) 100-10 MG/5ML liquid; Take 5 mL by mouth 3 (Three) Times a Day As Needed for Cough.  Dispense: 120 mL; Refill: 0    2. Nasal congestion  -     POCT SARS-CoV-2 Antigen ELEANOR + Flu    Other orders  -     amoxicillin (AMOXIL) 500 MG capsule; Take 1 capsule by mouth 3 (Three) Times a Day.  Dispense: 30 capsule; Refill: 0             Follow Up   No follow-ups on file.  Patient was given instructions and counseling regarding his condition or for health maintenance advice. Please see specific information pulled into the AVS if appropriate.

## 2024-01-03 ENCOUNTER — LAB (OUTPATIENT)
Dept: LAB | Facility: HOSPITAL | Age: 84
End: 2024-01-03
Payer: MEDICARE

## 2024-01-03 DIAGNOSIS — D69.6 THROMBOCYTOPENIA: ICD-10-CM

## 2024-01-03 LAB
ALBUMIN SERPL-MCNC: 4 G/DL (ref 3.5–5.2)
ALBUMIN/GLOB SERPL: 1.5 G/DL
ALP SERPL-CCNC: 58 U/L (ref 39–117)
ALT SERPL W P-5'-P-CCNC: 14 U/L (ref 1–41)
ANION GAP SERPL CALCULATED.3IONS-SCNC: 8 MMOL/L (ref 5–15)
AST SERPL-CCNC: 13 U/L (ref 1–40)
BASOPHILS # BLD AUTO: 0.01 10*3/MM3 (ref 0–0.2)
BASOPHILS NFR BLD AUTO: 0.2 % (ref 0–1.5)
BILIRUB SERPL-MCNC: 0.3 MG/DL (ref 0–1.2)
BUN SERPL-MCNC: 18 MG/DL (ref 8–23)
BUN/CREAT SERPL: 18.6 (ref 7–25)
CALCIUM SPEC-SCNC: 9.5 MG/DL (ref 8.6–10.5)
CHLORIDE SERPL-SCNC: 106 MMOL/L (ref 98–107)
CO2 SERPL-SCNC: 27 MMOL/L (ref 22–29)
CREAT SERPL-MCNC: 0.97 MG/DL (ref 0.76–1.27)
DEPRECATED RDW RBC AUTO: 43.3 FL (ref 37–54)
EGFRCR SERPLBLD CKD-EPI 2021: 78.4 ML/MIN/1.73
EOSINOPHIL # BLD AUTO: 0.38 10*3/MM3 (ref 0–0.4)
EOSINOPHIL NFR BLD AUTO: 7.9 % (ref 0.3–6.2)
ERYTHROCYTE [DISTWIDTH] IN BLOOD BY AUTOMATED COUNT: 13 % (ref 12.3–15.4)
GLOBULIN UR ELPH-MCNC: 2.6 GM/DL
GLUCOSE SERPL-MCNC: 145 MG/DL (ref 65–99)
HCT VFR BLD AUTO: 42.4 % (ref 37.5–51)
HGB BLD-MCNC: 14.1 G/DL (ref 13–17.7)
IGA1 MFR SER: 249 MG/DL (ref 70–400)
IGG1 SER-MCNC: 848 MG/DL (ref 700–1600)
IGM SERPL-MCNC: 76 MG/DL (ref 40–230)
LYMPHOCYTES # BLD AUTO: 0.55 10*3/MM3 (ref 0.7–3.1)
LYMPHOCYTES NFR BLD AUTO: 11.4 % (ref 19.6–45.3)
MCH RBC QN AUTO: 31.3 PG (ref 26.6–33)
MCHC RBC AUTO-ENTMCNC: 33.3 G/DL (ref 31.5–35.7)
MCV RBC AUTO: 94.2 FL (ref 79–97)
MONOCYTES # BLD AUTO: 0.39 10*3/MM3 (ref 0.1–0.9)
MONOCYTES NFR BLD AUTO: 8.1 % (ref 5–12)
NEUTROPHILS NFR BLD AUTO: 3.48 10*3/MM3 (ref 1.7–7)
NEUTROPHILS NFR BLD AUTO: 72.4 % (ref 42.7–76)
PATHOLOGY REVIEW: YES
PLATELET # BLD AUTO: 216 10*3/MM3 (ref 140–450)
PMV BLD AUTO: 8.5 FL (ref 6–12)
POTASSIUM SERPL-SCNC: 4.2 MMOL/L (ref 3.5–5.2)
PROT SERPL-MCNC: 6.6 G/DL (ref 6–8.5)
RBC # BLD AUTO: 4.5 10*6/MM3 (ref 4.14–5.8)
SODIUM SERPL-SCNC: 141 MMOL/L (ref 136–145)
WBC NRBC COR # BLD AUTO: 4.81 10*3/MM3 (ref 3.4–10.8)

## 2024-01-03 PROCEDURE — 82784 ASSAY IGA/IGD/IGG/IGM EACH: CPT | Performed by: INTERNAL MEDICINE

## 2024-01-03 PROCEDURE — 80053 COMPREHEN METABOLIC PANEL: CPT | Performed by: INTERNAL MEDICINE

## 2024-01-03 PROCEDURE — 36415 COLL VENOUS BLD VENIPUNCTURE: CPT

## 2024-01-03 PROCEDURE — 85025 COMPLETE CBC W/AUTO DIFF WBC: CPT

## 2024-01-04 LAB
ALBUMIN SERPL ELPH-MCNC: 3.6 G/DL (ref 2.9–4.4)
ALBUMIN/GLOB SERPL: 1.4 {RATIO} (ref 0.7–1.7)
ALPHA1 GLOB SERPL ELPH-MCNC: 0.2 G/DL (ref 0–0.4)
ALPHA2 GLOB SERPL ELPH-MCNC: 0.6 G/DL (ref 0.4–1)
B-GLOBULIN SERPL ELPH-MCNC: 1 G/DL (ref 0.7–1.3)
GAMMA GLOB SERPL ELPH-MCNC: 0.8 G/DL (ref 0.4–1.8)
GLOBULIN SER CALC-MCNC: 2.6 G/DL (ref 2.2–3.9)
KAPPA LC FREE SER-MCNC: 29.7 MG/L (ref 3.3–19.4)
KAPPA LC FREE/LAMBDA FREE SER: 1.53 {RATIO} (ref 0.26–1.65)
LAB AP CASE REPORT: NORMAL
LABORATORY COMMENT REPORT: NORMAL
LAMBDA LC FREE SERPL-MCNC: 19.4 MG/L (ref 5.7–26.3)
M PROTEIN SERPL ELPH-MCNC: NORMAL G/DL
PATH REPORT.FINAL DX SPEC: NORMAL
PROT PATTERN SERPL ELPH-IMP: NORMAL
PROT SERPL-MCNC: 6.2 G/DL (ref 6–8.5)

## 2024-01-08 LAB
ASSESSMENT OF LEUKOCYTES: NORMAL
CLINICAL INFO: NORMAL
GATING STRATEGY: NORMAL
IMMUNOPHENOTYPING STUDY: NORMAL
LABORATORY COMMENT REPORT: NORMAL
PATH INTERP SPEC-IMP: NORMAL
PATHOLOGIST NAME: NORMAL
SPECIMEN SOURCE: NORMAL
VIABLE CELLS NFR SPEC: NORMAL %

## 2024-01-09 NOTE — PROGRESS NOTES
Hematology/Oncology Outpatient Follow Up    PATIENT NAME:Mark Marie  :1940  MRN: [id number]  PRIMARY CARE PHYSICIAN: Diana Linares MD  REFERRING PHYSICIAN: Diana Linares MD    Chief Complaint   Patient presents with    Follow-up     5 month follow up  Thrombocytopenia        HISTORY OF PRESENT ILLNESS:       Mark Marie is a 83 y.o. male who presented to [facility] on 2023 at the direction of his primary care physician with complaints of fever, chills, weakness and falls.  Past medical history of type 2 diabetes mellitus, hypertension, hyperlipidemia, hypothyroidism, dementia prostate cancer in , squamous cell carcinoma of skin.  Patient reported he had been feeling poorly for over a week.  In addition to fever and chills he also reported nausea and diarrhea.  Intermittent episodes of dysphagia, difficulty breathing, hiccups and congestion.  Denied any abdominal pain or urinary symptoms.     Evaluation in the ED: WBC 3.90, hemoglobin 15 g/dL, MCV 91.8, platelets 43,000, bands 22%, lymphocytes 5%, plasma cells 1%.  BUN 27, creatinine 1.29, , ALT 53, negative respiratory panel, chest x-ray showing no acute cardiopulmonary disease, blood cultures drawn and pending, CT of the head without contrast showing no acute intracranial abnormality; age-appropriate volume loss; mild to moderate amount of low-density periventricular and subcortical white matter most commonly seen with chronic small vessel ischemic change.  UA positive for large amount of blood, trace leukocytes CT of the abdomen and pelvis without contrast showed possible enteritis, diverticulosis without diverticulitis, small hiatal hernia, hepatomegaly.  PT 12.0, INR 1.13.     23  Hematology/Oncology was consulted for acute thrombocytopenia.  Patient had platelets presented with platelet count of 43,000 with bandemia.  He also has had a febrile illness no obvious source at this time..  Patient has a history of  underlying mild dementia    1/3/2024: WBC 4.81, hemoglobin 14.1, MCV 94.2, platelets 216,000, lymphocytes 11.4%, kappa FLC 29.7, kappa lambda ratio 1.53, flow with no significant immunophenotypic abnormality detected, M spike not observed, IgG, IgA, IgM normal.     He/She  has a past medical history of Cataract (removed 16 years ago), Diabetes mellitus, HL (hearing loss) (have worn hearing aids for 16 years), Hyperlipidemia, Hypertension, Hypothyroidism, Joint pain, Low back pain, Prostate cancer (2011), and Squamous cell carcinoma of skin.     PCP: Diana Linares MD    Past Medical History:   Diagnosis Date    Cataract removed 16 years ago    Diabetes mellitus     Ehrlichiosis, unspecified 6/12/2023    Hiccups 6/12/2023    HL (hearing loss) have worn hearing aids for 16 years    Hyperlipidemia     Hypertension     Hypothyroidism     Joint pain     Low back pain     Non-traumatic rhabdomyolysis 06/09/2023    Obesity (BMI 30-39.9) 6/12/2023    Prostate cancer 2011    Squamous cell carcinoma of skin     right temple       Past Surgical History:   Procedure Laterality Date    HEMORROIDECTOMY      HERNIA REPAIR      inguinal and umbilical    KNEE SURGERY Bilateral     trimmed cartilage    VASECTOMY  50 years ago         Current Outpatient Medications:     donepezil (ARICEPT) 5 MG tablet, TAKE 1 TABLET BY MOUTH EVERY DAY AT NIGHT, Disp: 90 tablet, Rfl: 3    levothyroxine (SYNTHROID, LEVOTHROID) 25 MCG tablet, TAKE 1 TABLET BY MOUTH EVERY DAY, Disp: 90 tablet, Rfl: 3    losartan (COZAAR) 50 MG tablet, TAKE 1 TABLET BY MOUTH EVERY DAY, Disp: 90 tablet, Rfl: 3    metFORMIN (GLUCOPHAGE) 500 MG tablet, TAKE 1 TABLET BY MOUTH TWICE A DAY, Disp: 180 tablet, Rfl: 3    simvastatin (ZOCOR) 80 MG tablet, TAKE 1/2 TABLET BY MOUTH EVERY DAY, Disp: 45 tablet, Rfl: 7    tamsulosin (FLOMAX) 0.4 MG capsule 24 hr capsule, Take 1 capsule by mouth Daily., Disp: , Rfl:     traMADol (ULTRAM) 50 MG tablet, TAKE 1 TABLET BY MOUTH EVERY 6  HOURS AS NEEDED FOR PAIN (MODERATE PAIN), Disp: 120 tablet, Rfl: 0    ondansetron ODT (ZOFRAN-ODT) 4 MG disintegrating tablet, Place 1 tablet on the tongue Every 8 (Eight) Hours As Needed for Nausea or Vomiting. (Patient not taking: Reported on 1/10/2024), Disp: 20 tablet, Rfl: 1    triamcinolone (KENALOG) 0.1 % cream, Apply 1 application  topically to the appropriate area as directed 2 (Two) Times a Day. (Patient not taking: Reported on 1/10/2024), Disp: 80 g, Rfl: 1    Allergies   Allergen Reactions    Ramipril Swelling     Leg swelling       Family History   Problem Relation Age of Onset    Heart disease Father     Hypertension Father     Hypertension Mother     Other Mother         Alzheimers    Cancer Brother         prostate    Cancer Brother         prostate    Hypertension Brother     Cancer Daughter        Cancer-related family history includes Cancer in his brother, brother, and daughter.    Social History     Tobacco Use    Smoking status: Never    Smokeless tobacco: Never   Vaping Use    Vaping Use: Never used   Substance Use Topics    Alcohol use: Not Currently     Comment: rarely;  caffeine 1c qd    Drug use: No     I have reviewed and confirmed the accuracy of the patient's history: Chief complaint, HPI, ROS, and Subjective as entered by the MA/LPN/RN. Cleo Gabriel, APRN 01/10/24      SUBJECTIVE:  Mark is here today for his routine follow-up.  He reports that he has been doing very well.  He is back to his normal activity and reports taking a ocean cruise since his last appointment during which she was able to do quite a bit of walking and felt very well.  He denies any unusual bleeding or bruising.  He denies any fevers, night sweats.  He denies any weakness.        REVIEW OF SYSTEMS:    Review of Systems   Constitutional:  Negative for chills, fatigue and fever.   Gastrointestinal:  Negative for anal bleeding.   Genitourinary:  Negative for hematuria.   Hematological:  Does not bruise/bleed  "easily.       OBJECTIVE:    Vitals:    01/10/24 1035   BP: 145/81   Pulse: 75   SpO2: 97%   Weight: 91 kg (200 lb 9.6 oz)   Height: 162.6 cm (64\")   PainSc: 0-No pain       Body mass index is 34.43 kg/m².    ECOG  (2) Ambulatory and capable of self care, unable to carry out work activity, up and about > 50% or waking hours    Physical Exam  Vitals reviewed.   Constitutional:       General: He is not in acute distress.     Appearance: Normal appearance. He is normal weight.   HENT:      Head: Normocephalic and atraumatic.   Eyes:      Extraocular Movements: Extraocular movements intact.   Cardiovascular:      Rate and Rhythm: Normal rate.      Heart sounds: No murmur heard.  Pulmonary:      Effort: Pulmonary effort is normal. No respiratory distress.   Abdominal:      Palpations: Abdomen is soft.   Musculoskeletal:         General: Normal range of motion.      Cervical back: Normal range of motion.   Lymphadenopathy:      Cervical: No cervical adenopathy.   Skin:     General: Skin is warm.   Neurological:      Mental Status: He is alert and oriented to person, place, and time.   Psychiatric:         Mood and Affect: Mood normal.         Behavior: Behavior normal.         I have reexamined the patient and the results are consistent with the previously documented exam. Cleo Gabriel, APRN       RECENT LABS  WBC   Date Value Ref Range Status   01/03/2024 4.81 3.40 - 10.80 10*3/mm3 Final     RBC   Date Value Ref Range Status   01/03/2024 4.50 4.14 - 5.80 10*6/mm3 Final     Hemoglobin   Date Value Ref Range Status   01/03/2024 14.1 13.0 - 17.7 g/dL Final     Hematocrit   Date Value Ref Range Status   01/03/2024 42.4 37.5 - 51.0 % Final     MCV   Date Value Ref Range Status   01/03/2024 94.2 79.0 - 97.0 fL Final     MCH   Date Value Ref Range Status   01/03/2024 31.3 26.6 - 33.0 pg Final     MCHC   Date Value Ref Range Status   01/03/2024 33.3 31.5 - 35.7 g/dL Final     RDW   Date Value Ref Range Status   01/03/2024 " 13.0 12.3 - 15.4 % Final     RDW-SD   Date Value Ref Range Status   01/03/2024 43.3 37.0 - 54.0 fl Final     MPV   Date Value Ref Range Status   01/03/2024 8.5 6.0 - 12.0 fL Final     Platelets   Date Value Ref Range Status   01/03/2024 216 140 - 450 10*3/mm3 Final     Neutrophil %   Date Value Ref Range Status   01/03/2024 72.4 42.7 - 76.0 % Final     Lymphocyte %   Date Value Ref Range Status   01/03/2024 11.4 (L) 19.6 - 45.3 % Final     Monocyte %   Date Value Ref Range Status   01/03/2024 8.1 5.0 - 12.0 % Final     Eosinophil %   Date Value Ref Range Status   01/03/2024 7.9 (H) 0.3 - 6.2 % Final     Basophil %   Date Value Ref Range Status   01/03/2024 0.2 0.0 - 1.5 % Final     Immature Grans %   Date Value Ref Range Status   07/21/2023 0.2 0.0 - 0.5 % Final     Neutrophils, Absolute   Date Value Ref Range Status   01/03/2024 3.48 1.70 - 7.00 10*3/mm3 Final     Lymphocytes, Absolute   Date Value Ref Range Status   01/03/2024 0.55 (L) 0.70 - 3.10 10*3/mm3 Final     Monocytes, Absolute   Date Value Ref Range Status   01/03/2024 0.39 0.10 - 0.90 10*3/mm3 Final     Eosinophils, Absolute   Date Value Ref Range Status   01/03/2024 0.38 0.00 - 0.40 10*3/mm3 Final     Basophils, Absolute   Date Value Ref Range Status   01/03/2024 0.01 0.00 - 0.20 10*3/mm3 Final     Immature Grans, Absolute   Date Value Ref Range Status   07/21/2023 0.01 0.00 - 0.05 10*3/mm3 Final     nRBC   Date Value Ref Range Status   07/21/2023 0.0 0.0 - 0.2 /100 WBC Final       Lab Results   Component Value Date    GLUCOSE 145 (H) 01/03/2024    BUN 18 01/03/2024    CREATININE 0.97 01/03/2024    EGFRIFNONA 76 09/20/2021    BCR 18.6 01/03/2024    K 4.2 01/03/2024    CO2 27.0 01/03/2024    CALCIUM 9.5 01/03/2024    PROTENTOTREF 6.2 01/03/2024    ALBUMIN 4.0 01/03/2024    ALBUMIN 3.6 01/03/2024    LABIL2 1.4 01/03/2024    AST 13 01/03/2024    ALT 14 01/03/2024         ASSESSMENT:    Acute thrombocytopenia: Resolved while in the hospital.  Patient with a  platelet count of 43,000 upon presentation to the hospital.  Work-up was negative for nutritional deficiency or hemolysis.  He had a very elevated LDH and a low reticulocyte count suggesting bone marrow suppression.  Peripheral smear was negative for blasts.  No M spike noted on SPEP.  Overall counts were recovering very well.  He was noted to have up to 14% blasts on his differential and therefore we sent his blood out for flow cytometry which showed circulating monoclonal plasma cells 0.3% of leukocytes his subsequent FISH testing was normal.  On repeat flow cytometry is normal.  [place] spotted fever: Febrile illness: Resolved on IV doxycycline.    Weakness and deconditioning: Has completed physical therapy improving  History of prostate cancer in 2011, history of squamous cell carcinoma of the skin  Chronic conditions: Type 2 diabetes mellitus, hyperlipidemia, hypertension, hypothyroidism    PLAN:     Reviewed his CBC today with the patient  Hematologically he has recovered  Reviewed normal flow cytometry with the patient  Patient requesting to begin donating platelets again as he did prior to his infection over the summer.  With 6 months of normal platelet counts I agreed that it would be okay for him to once again donate.  Follow-up 6 months with Dr. Valencia or sooner if needed  All questions answered         I spent 30 total minutes, face-to-face, caring for Mark today. 90% of this time involved counseling and/or coordination of care as documented within this note.        Patient verbalized understanding and is in agreement of the above plan.

## 2024-01-10 ENCOUNTER — OFFICE VISIT (OUTPATIENT)
Dept: ONCOLOGY | Facility: CLINIC | Age: 84
End: 2024-01-10
Payer: MEDICARE

## 2024-01-10 VITALS
WEIGHT: 200.6 LBS | OXYGEN SATURATION: 97 % | HEIGHT: 64 IN | HEART RATE: 75 BPM | SYSTOLIC BLOOD PRESSURE: 145 MMHG | BODY MASS INDEX: 34.25 KG/M2 | DIASTOLIC BLOOD PRESSURE: 81 MMHG

## 2024-01-10 DIAGNOSIS — D69.6 THROMBOCYTOPENIA: Primary | ICD-10-CM

## 2024-02-06 ENCOUNTER — LAB (OUTPATIENT)
Dept: FAMILY MEDICINE CLINIC | Facility: CLINIC | Age: 84
End: 2024-02-06
Payer: MEDICARE

## 2024-02-06 DIAGNOSIS — E11.8 DISORDER ASSOCIATED WITH TYPE 2 DIABETES MELLITUS: Primary | ICD-10-CM

## 2024-02-06 PROCEDURE — 83036 HEMOGLOBIN GLYCOSYLATED A1C: CPT | Performed by: FAMILY MEDICINE

## 2024-02-06 PROCEDURE — 82570 ASSAY OF URINE CREATININE: CPT | Performed by: FAMILY MEDICINE

## 2024-02-06 PROCEDURE — 80053 COMPREHEN METABOLIC PANEL: CPT | Performed by: FAMILY MEDICINE

## 2024-02-06 PROCEDURE — 36415 COLL VENOUS BLD VENIPUNCTURE: CPT | Performed by: FAMILY MEDICINE

## 2024-02-06 PROCEDURE — 82043 UR ALBUMIN QUANTITATIVE: CPT | Performed by: FAMILY MEDICINE

## 2024-02-07 LAB
ALBUMIN SERPL-MCNC: 4.1 G/DL (ref 3.5–5.2)
ALBUMIN UR-MCNC: <1.2 MG/DL
ALBUMIN/GLOB SERPL: 1.6 G/DL
ALP SERPL-CCNC: 59 U/L (ref 39–117)
ALT SERPL W P-5'-P-CCNC: 13 U/L (ref 1–41)
ANION GAP SERPL CALCULATED.3IONS-SCNC: 11.8 MMOL/L (ref 5–15)
AST SERPL-CCNC: 12 U/L (ref 1–40)
BILIRUB SERPL-MCNC: 0.3 MG/DL (ref 0–1.2)
BUN SERPL-MCNC: 19 MG/DL (ref 8–23)
BUN/CREAT SERPL: 20 (ref 7–25)
CALCIUM SPEC-SCNC: 9.7 MG/DL (ref 8.6–10.5)
CHLORIDE SERPL-SCNC: 102 MMOL/L (ref 98–107)
CO2 SERPL-SCNC: 26.2 MMOL/L (ref 22–29)
CREAT SERPL-MCNC: 0.95 MG/DL (ref 0.76–1.27)
CREAT UR-MCNC: 98.6 MG/DL
EGFRCR SERPLBLD CKD-EPI 2021: 80.4 ML/MIN/1.73
GLOBULIN UR ELPH-MCNC: 2.6 GM/DL
GLUCOSE SERPL-MCNC: 110 MG/DL (ref 65–99)
HBA1C MFR BLD: 5.9 % (ref 4.8–5.6)
MICROALBUMIN/CREAT UR: NORMAL MG/G{CREAT}
POTASSIUM SERPL-SCNC: 4.2 MMOL/L (ref 3.5–5.2)
PROT SERPL-MCNC: 6.7 G/DL (ref 6–8.5)
SODIUM SERPL-SCNC: 140 MMOL/L (ref 136–145)

## 2024-03-24 DIAGNOSIS — R41.3 MEMORY CHANGE: ICD-10-CM

## 2024-03-25 RX ORDER — DONEPEZIL HYDROCHLORIDE 5 MG/1
TABLET, FILM COATED ORAL
Qty: 90 TABLET | Refills: 3 | Status: SHIPPED | OUTPATIENT
Start: 2024-03-25

## 2024-03-25 RX ORDER — LOSARTAN POTASSIUM 50 MG/1
TABLET ORAL
Qty: 90 TABLET | Refills: 3 | Status: SHIPPED | OUTPATIENT
Start: 2024-03-25

## 2024-03-25 RX ORDER — LEVOTHYROXINE SODIUM 0.03 MG/1
TABLET ORAL
Qty: 90 TABLET | Refills: 3 | Status: SHIPPED | OUTPATIENT
Start: 2024-03-25

## 2024-04-10 ENCOUNTER — TELEPHONE (OUTPATIENT)
Dept: FAMILY MEDICINE CLINIC | Facility: CLINIC | Age: 84
End: 2024-04-10
Payer: MEDICARE

## 2024-04-10 NOTE — TELEPHONE ENCOUNTER
I called his pharmacy, [facility] in [place], and they said he has been picking up a regular Rx for Tramadol for several months but it is now out of refills.  His last Rx was picked up on 4/4/24. His insurance has a limit on how many pills he is allowed to get.

## 2024-04-10 NOTE — TELEPHONE ENCOUNTER
PATIENT CAME IN AND SAID THAT HIS TRAMADOL WAS SENT IN AND IT SHOULD BE 90 PILLS AND IT NEVER HAS REFILLS. THIS TIME IT HAD 2 REFILLS BUT ONLY 24 PILLS.

## 2024-04-11 ENCOUNTER — TELEPHONE (OUTPATIENT)
Dept: FAMILY MEDICINE CLINIC | Facility: CLINIC | Age: 84
End: 2024-04-11
Payer: MEDICARE

## 2024-04-11 DIAGNOSIS — G89.29 CHRONIC BILATERAL LOW BACK PAIN WITHOUT SCIATICA: ICD-10-CM

## 2024-04-11 DIAGNOSIS — M54.50 CHRONIC BILATERAL LOW BACK PAIN WITHOUT SCIATICA: ICD-10-CM

## 2024-04-11 NOTE — TELEPHONE ENCOUNTER
Caller: Mark Marie    Relationship: Self    Best call back number:     What medication are you requesting:traMADol (ULTRAM) 50 MG tablet     What are your current symptoms:     How long have you been experiencing symptoms:     Have you had these symptoms before:    [x] Yes  [] No    Have you been treated for these symptoms before:   [x] Yes  [] No    If a prescription is needed, what is your preferred pharmacy and phone number: [facility] #[id number] - [address] AT [place] [address] - [phone] [facility] [phone]      Additional notes: PRESCRIPTION HAS TO BE FOR 90 TABLETS WHICH IS A 30 DAY SUPPLY.

## 2024-04-11 NOTE — TELEPHONE ENCOUNTER
Patient notified verbally on 4/11 @ 8:58AM and he was notified to get in contact with his insurance to see how many they are wanting to cover.

## 2024-04-12 RX ORDER — TRAMADOL HYDROCHLORIDE 50 MG/1
50 TABLET ORAL EVERY 8 HOURS PRN
Qty: 90 TABLET | Refills: 0 | Status: SHIPPED | OUTPATIENT
Start: 2024-04-12

## 2024-05-14 ENCOUNTER — OFFICE VISIT (OUTPATIENT)
Dept: FAMILY MEDICINE CLINIC | Facility: CLINIC | Age: 84
End: 2024-05-14
Payer: MEDICARE

## 2024-05-14 VITALS
WEIGHT: 202.3 LBS | TEMPERATURE: 98.4 F | HEIGHT: 64 IN | HEART RATE: 75 BPM | BODY MASS INDEX: 34.54 KG/M2 | SYSTOLIC BLOOD PRESSURE: 138 MMHG | DIASTOLIC BLOOD PRESSURE: 88 MMHG | OXYGEN SATURATION: 97 %

## 2024-05-14 DIAGNOSIS — G56.03 BILATERAL CARPAL TUNNEL SYNDROME: Primary | ICD-10-CM

## 2024-05-14 PROCEDURE — 99213 OFFICE O/P EST LOW 20 MIN: CPT | Performed by: FAMILY MEDICINE

## 2024-05-14 PROCEDURE — G2211 COMPLEX E/M VISIT ADD ON: HCPCS | Performed by: FAMILY MEDICINE

## 2024-05-14 PROCEDURE — 3075F SYST BP GE 130 - 139MM HG: CPT | Performed by: FAMILY MEDICINE

## 2024-05-14 PROCEDURE — 1160F RVW MEDS BY RX/DR IN RCRD: CPT | Performed by: FAMILY MEDICINE

## 2024-05-14 PROCEDURE — 1159F MED LIST DOCD IN RCRD: CPT | Performed by: FAMILY MEDICINE

## 2024-05-14 PROCEDURE — 3079F DIAST BP 80-89 MM HG: CPT | Performed by: FAMILY MEDICINE

## 2024-05-14 PROCEDURE — 1126F AMNT PAIN NOTED NONE PRSNT: CPT | Performed by: FAMILY MEDICINE

## 2024-05-14 RX ORDER — CLINDAMYCIN HYDROCHLORIDE 150 MG/1
CAPSULE ORAL
COMMUNITY
Start: 2024-01-17

## 2024-05-14 RX ORDER — PREDNISONE 10 MG/1
10 TABLET ORAL 2 TIMES DAILY
Qty: 10 TABLET | Refills: 0 | Status: SHIPPED | OUTPATIENT
Start: 2024-05-14

## 2024-05-14 RX ORDER — HYDROCODONE BITARTRATE AND ACETAMINOPHEN 7.5; 325 MG/1; MG/1
1 TABLET ORAL EVERY 6 HOURS
COMMUNITY
Start: 2024-01-30

## 2024-05-14 NOTE — PROGRESS NOTES
"Chief Complaint  Carpal Tunnel    Subjective        Mark Marie presents to [facility]  History of Present Illness  He has some numbness of his fingers.  He has pain that wakes him up from sleeping.  Right worse than left. Strength is ok. Worse symptoms since tilling his garden with a tiller for quite a while.   Carpal Tunnel  This is a chronic problem. The current episode started more than 1 year ago. The problem occurs 2 to 4 times per day. The problem has been gradually worsening. Pertinent negatives include no chest pain, chills, congestion, coughing, diaphoresis, fatigue, fever, joint swelling, rash or swollen glands. He has tried nothing for the symptoms.       Objective   Vital Signs:  /88   Pulse 75   Temp 98.4 °F (36.9 °C) (Infrared)   Ht 162.6 cm (64\")   Wt 91.8 kg (202 lb 4.8 oz)   SpO2 97%   BMI 34.72 kg/m²   Estimated body mass index is 34.72 kg/m² as calculated from the following:    Height as of this encounter: 162.6 cm (64\").    Weight as of this encounter: 91.8 kg (202 lb 4.8 oz).       BMI is >= 30 and <35. (Class 1 Obesity). The following options were offered after discussion;: exercise counseling/recommendations      Physical Exam  Vitals and nursing note reviewed.   Constitutional:       General: He is not in acute distress.     Appearance: He is well-developed.   HENT:      Head: Normocephalic.   Eyes:      General: Lids are normal.      Conjunctiva/sclera: Conjunctivae normal.   Neck:      Thyroid: No thyroid mass or thyromegaly.      Trachea: Trachea normal.   Cardiovascular:      Rate and Rhythm: Normal rate and regular rhythm.      Heart sounds: Normal heart sounds.   Pulmonary:      Effort: Pulmonary effort is normal.      Breath sounds: Normal breath sounds.   Musculoskeletal:         General: No swelling or tenderness.      Right hand: No swelling or deformity. Decreased sensation of the median distribution.      Left hand: No swelling or " deformity.      Cervical back: Normal range of motion.   Lymphadenopathy:      Cervical: No cervical adenopathy.   Skin:     General: Skin is warm and dry.   Neurological:      Mental Status: He is alert and oriented to person, place, and time.   Psychiatric:         Attention and Perception: He is attentive.         Mood and Affect: Mood normal.         Speech: Speech normal.         Behavior: Behavior normal.        Result Review :    The following data was reviewed by: Diana Linares MD on 05/14/2024:  Common labs          8/7/2023    11:22 1/3/2024    10:36 2/6/2024    14:11   Common Labs   Glucose  145  110    BUN  18  19    Creatinine  0.97  0.95    Sodium  141  140    Potassium  4.2  4.2    Chloride  106  102    Calcium  9.5  9.7    Total Protein  6.2     Albumin  4.0     3.6  4.1    Total Bilirubin  0.3  0.3    Alkaline Phosphatase  58  59    AST (SGOT)  13  12    ALT (SGPT)  14  13    WBC 4.75  4.81     Hemoglobin 13.2  14.1     Hematocrit 39.7  42.4     Platelets 244  216     Hemoglobin A1C   5.90    Microalbumin, Urine   <1.2                   Assessment and Plan     Diagnoses and all orders for this visit:    1. Bilateral carpal tunnel syndrome (Primary)  -     Ambulatory Referral to Hand Surgery    Other orders  -     predniSONE (DELTASONE) 10 MG tablet; Take 1 tablet by mouth 2 (Two) Times a Day.  Dispense: 10 tablet; Refill: 0             Follow Up     No follow-ups on file.  Patient was given instructions and counseling regarding his condition or for health maintenance advice. Please see specific information pulled into the AVS if appropriate.

## 2024-06-23 RX ORDER — SIMVASTATIN 80 MG
TABLET ORAL
Qty: 45 TABLET | Refills: 7 | Status: SHIPPED | OUTPATIENT
Start: 2024-06-23

## 2024-07-08 ENCOUNTER — TELEPHONE (OUTPATIENT)
Dept: ONCOLOGY | Facility: CLINIC | Age: 84
End: 2024-07-08

## 2024-07-08 NOTE — TELEPHONE ENCOUNTER
Caller: Mark Marie    Relationship: Self    Best call back number: [phone]    What is the best time to reach you: ASAP    Who are you requesting to speak with (clinical staff, provider,  specific staff member): SCHEDULING    What was the call regarding: PT NEEDS TO R/S HIS APPT , CAN'T COME THIS WEDNESDAY 7/10.  PLEASE CALL PT BACK TO R/S AS NEEDED.  PT CAN NOT COME ON 17TH OR 18TH.  OK TO LEAVE MESSAGE ON HOUSE PHONE IF NO ANSWER, BUT ASKED TO PLEASE TRY HIS CELL  # FIRST.

## 2024-07-11 RX ORDER — PREDNISONE 10 MG/1
10 TABLET ORAL 2 TIMES DAILY
Qty: 10 TABLET | Refills: 0 | Status: SHIPPED | OUTPATIENT
Start: 2024-07-11

## 2024-07-22 RX ORDER — PREDNISONE 10 MG/1
10 TABLET ORAL 2 TIMES DAILY
Qty: 10 TABLET | Refills: 0 | Status: SHIPPED | OUTPATIENT
Start: 2024-07-22

## 2024-08-26 NOTE — PROGRESS NOTES
Hematology/Oncology Outpatient Follow Up    PATIENT NAME:Mark Marie  :1940  MRN: [id number]  PRIMARY CARE PHYSICIAN: Diana Linares MD  REFERRING PHYSICIAN: Diana Linares MD    Chief Complaint   Patient presents with    Follow-up     Thrombocytopenia        HISTORY OF PRESENT ILLNESS:       Mark Marie is a 83 y.o. male who presented to [facility] on 2023 at the direction of his primary care physician with complaints of fever, chills, weakness and falls.  Past medical history of type 2 diabetes mellitus, hypertension, hyperlipidemia, hypothyroidism, dementia prostate cancer in , squamous cell carcinoma of skin.  Patient reported he had been feeling poorly for over a week.  In addition to fever and chills he also reported nausea and diarrhea.  Intermittent episodes of dysphagia, difficulty breathing, hiccups and congestion.  Denied any abdominal pain or urinary symptoms.     Evaluation in the ED: WBC 3.90, hemoglobin 15 g/dL, MCV 91.8, platelets 43,000, bands 22%, lymphocytes 5%, plasma cells 1%.  BUN 27, creatinine 1.29, , ALT 53, negative respiratory panel, chest x-ray showing no acute cardiopulmonary disease, blood cultures drawn and pending, CT of the head without contrast showing no acute intracranial abnormality; age-appropriate volume loss; mild to moderate amount of low-density periventricular and subcortical white matter most commonly seen with chronic small vessel ischemic change.  UA positive for large amount of blood, trace leukocytes CT of the abdomen and pelvis without contrast showed possible enteritis, diverticulosis without diverticulitis, small hiatal hernia, hepatomegaly.  PT 12.0, INR 1.13.     23  Hematology/Oncology was consulted for acute thrombocytopenia.  Patient had platelets presented with platelet count of 43,000 with bandemia.  He also has had a febrile illness no obvious source at this time..  Patient has a history of underlying mild  dementia    1/3/2024: WBC 4.81, hemoglobin 14.1, MCV 94.2, platelets 216,000, lymphocytes 11.4%, kappa FLC 29.7, kappa lambda ratio 1.53, flow with no significant immunophenotypic abnormality detected, M spike not observed, IgG, IgA, IgM normal.     He/She  has a past medical history of Cataract (removed 16 years ago), Diabetes mellitus, HL (hearing loss) (have worn hearing aids for 16 years), Hyperlipidemia, Hypertension, Hypothyroidism, Joint pain, Low back pain, Prostate cancer (2011), and Squamous cell carcinoma of skin.     PCP: Dinaa Linares MD    Past Medical History:   Diagnosis Date    Cataract removed 16 years ago    Diabetes mellitus     Ehrlichiosis, unspecified 6/12/2023    Hiccups 6/12/2023    HL (hearing loss) have worn hearing aids for 16 years    Hyperlipidemia     Hypertension     Hypothyroidism     Joint pain     Low back pain     Non-traumatic rhabdomyolysis 06/09/2023    Obesity (BMI 30-39.9) 6/12/2023    Prostate cancer 2011    Squamous cell carcinoma of skin     right temple       Past Surgical History:   Procedure Laterality Date    HEMORROIDECTOMY      HERNIA REPAIR      inguinal and umbilical    KNEE SURGERY Bilateral     trimmed cartilage    VASECTOMY  50 years ago         Current Outpatient Medications:     donepezil (ARICEPT) 5 MG tablet, TAKE 1 TABLET BY MOUTH EVERY DAY AT NIGHT, Disp: 90 tablet, Rfl: 3    HYDROcodone-acetaminophen (NORCO) 7.5-325 MG per tablet, Take 1 tablet by mouth Every 6 (Six) Hours., Disp: , Rfl:     levothyroxine (SYNTHROID, LEVOTHROID) 25 MCG tablet, TAKE 1 TABLET BY MOUTH EVERY DAY, Disp: 90 tablet, Rfl: 3    losartan (COZAAR) 50 MG tablet, TAKE 1 TABLET BY MOUTH EVERY DAY, Disp: 90 tablet, Rfl: 3    metFORMIN (GLUCOPHAGE) 500 MG tablet, TAKE 1 TABLET BY MOUTH TWICE A DAY, Disp: 180 tablet, Rfl: 3    simvastatin (ZOCOR) 80 MG tablet, TAKE 1/2 TABLET BY MOUTH EVERY DAY, Disp: 45 tablet, Rfl: 7    tamsulosin (FLOMAX) 0.4 MG capsule 24 hr capsule, Take 1  "capsule by mouth Daily., Disp: , Rfl:     traMADol (ULTRAM) 50 MG tablet, Take 1 tablet by mouth Every 8 (Eight) Hours As Needed for Moderate Pain., Disp: 90 tablet, Rfl: 0    clindamycin (CLEOCIN) 150 MG capsule, take 1 capsule by mouth three times a day until gone (Patient not taking: Reported on 8/30/2024), Disp: , Rfl:     predniSONE (DELTASONE) 10 MG tablet, TAKE 1 TABLET BY MOUTH TWICE A DAY (Patient not taking: Reported on 8/30/2024), Disp: 10 tablet, Rfl: 0    Allergies   Allergen Reactions    Ramipril Swelling     Leg swelling       Family History   Problem Relation Age of Onset    Heart disease Father     Hypertension Father     Hypertension Mother     Other Mother         Alzheimers    Cancer Brother         prostate    Cancer Brother         prostate    Hypertension Brother     Cancer Daughter        Cancer-related family history includes Cancer in his brother, brother, and daughter.    Social History     Tobacco Use    Smoking status: Never    Smokeless tobacco: Never   Vaping Use    Vaping status: Never Used   Substance Use Topics    Alcohol use: Not Currently     Comment: rarely;  caffeine 1c qd    Drug use: No     I have reviewed and confirmed the accuracy of the patient's history: Chief complaint, HPI, ROS, and Subjective as entered by the MA/LPN/RN. Halliefrancisca Valencia MD 08/30/24          SUBJECTIVE:      Patient is  here today for follow-up denies any new issues.        REVIEW OF SYSTEMS:    Review of Systems   Constitutional:  Negative for chills, fatigue and fever.   Gastrointestinal:  Negative for anal bleeding.   Genitourinary:  Negative for hematuria.   Hematological:  Does not bruise/bleed easily.       OBJECTIVE:    Vitals:    08/30/24 1257   BP: 134/70   Pulse: 72   SpO2: 96%   Weight: 92.1 kg (203 lb)   Height: 162.6 cm (64.02\")   PainSc: 0-No pain         Body mass index is 34.83 kg/m².    ECOG  (2) Ambulatory and capable of self care, unable to carry out work activity, up and about " > 50% or waking hours    Physical Exam  Vitals reviewed.   Constitutional:       General: He is not in acute distress.     Appearance: Normal appearance. He is normal weight.   HENT:      Head: Normocephalic and atraumatic.   Eyes:      Extraocular Movements: Extraocular movements intact.   Cardiovascular:      Rate and Rhythm: Normal rate.      Heart sounds: No murmur heard.  Pulmonary:      Effort: Pulmonary effort is normal. No respiratory distress.   Abdominal:      Palpations: Abdomen is soft.   Musculoskeletal:         General: Normal range of motion.      Cervical back: Normal range of motion.   Lymphadenopathy:      Cervical: No cervical adenopathy.   Skin:     General: Skin is warm.   Neurological:      Mental Status: He is alert and oriented to person, place, and time.   Psychiatric:         Mood and Affect: Mood normal.         Behavior: Behavior normal.     I have reexamined the patient and the results are consistent with the previously documented exam. Hallie Valencia MD        RECENT LABS  WBC   Date Value Ref Range Status   08/30/2024 4.58 3.40 - 10.80 10*3/mm3 Final     RBC   Date Value Ref Range Status   08/30/2024 4.31 4.14 - 5.80 10*6/mm3 Final     Hemoglobin   Date Value Ref Range Status   08/30/2024 13.6 13.0 - 17.7 g/dL Final     Hematocrit   Date Value Ref Range Status   08/30/2024 41.9 37.5 - 51.0 % Final     MCV   Date Value Ref Range Status   08/30/2024 97.2 (H) 79.0 - 97.0 fL Final     MCH   Date Value Ref Range Status   08/30/2024 31.6 26.6 - 33.0 pg Final     MCHC   Date Value Ref Range Status   08/30/2024 32.5 31.5 - 35.7 g/dL Final     RDW   Date Value Ref Range Status   08/30/2024 12.8 12.3 - 15.4 % Final     RDW-SD   Date Value Ref Range Status   08/30/2024 44.7 37.0 - 54.0 fl Final     MPV   Date Value Ref Range Status   08/30/2024 9.4 6.0 - 12.0 fL Final     Platelets   Date Value Ref Range Status   08/30/2024 127 (L) 140 - 450 10*3/mm3 Final     Neutrophil %   Date Value  Ref Range Status   08/30/2024 77.0 (H) 42.7 - 76.0 % Final     Lymphocyte %   Date Value Ref Range Status   08/30/2024 9.2 (L) 19.6 - 45.3 % Final     Monocyte %   Date Value Ref Range Status   08/30/2024 7.9 5.0 - 12.0 % Final     Eosinophil %   Date Value Ref Range Status   08/30/2024 5.5 0.3 - 6.2 % Final     Basophil %   Date Value Ref Range Status   08/30/2024 0.4 0.0 - 1.5 % Final     Immature Grans %   Date Value Ref Range Status   07/21/2023 0.2 0.0 - 0.5 % Final     Neutrophils, Absolute   Date Value Ref Range Status   08/30/2024 3.53 1.70 - 7.00 10*3/mm3 Final     Lymphocytes, Absolute   Date Value Ref Range Status   08/30/2024 0.42 (L) 0.70 - 3.10 10*3/mm3 Final     Monocytes, Absolute   Date Value Ref Range Status   08/30/2024 0.36 0.10 - 0.90 10*3/mm3 Final     Eosinophils, Absolute   Date Value Ref Range Status   08/30/2024 0.25 0.00 - 0.40 10*3/mm3 Final     Basophils, Absolute   Date Value Ref Range Status   08/30/2024 0.02 0.00 - 0.20 10*3/mm3 Final     Immature Grans, Absolute   Date Value Ref Range Status   07/21/2023 0.01 0.00 - 0.05 10*3/mm3 Final     nRBC   Date Value Ref Range Status   07/21/2023 0.0 0.0 - 0.2 /100 WBC Final       Lab Results   Component Value Date    GLUCOSE 110 (H) 02/06/2024    BUN 19 02/06/2024    CREATININE 0.95 02/06/2024    EGFRIFNONA 76 09/20/2021    BCR 20.0 02/06/2024    K 4.2 02/06/2024    CO2 26.2 02/06/2024    CALCIUM 9.7 02/06/2024    PROTENTOTREF 6.2 01/03/2024    ALBUMIN 4.1 02/06/2024    LABIL2 1.4 01/03/2024    AST 12 02/06/2024    ALT 13 02/06/2024         ASSESSMENT:    Acute thrombocytopenia: Resolved while in the hospital.  Patient with a platelet count of 43,000 upon presentation to the hospital.  Work-up was negative for nutritional deficiency or hemolysis.  He had a very elevated LDH and a low reticulocyte count suggesting bone marrow suppression.  Peripheral smear was negative for blasts.  No M spike noted on SPEP.  Overall counts were recovering very  well.  He was noted to have up to 14% blasts on his differential and therefore we sent his blood out for flow cytometry which showed circulating monoclonal plasma cells 0.3% of leukocytes his subsequent FISH testing was normal.  On repeat flow cytometry is normal.  Mild thrombocytopenia.  Platelets today 127.  Patient continues to donate blood  [place] spotted fever: Febrile illness: Resolved on IV doxycycline.    Weakness and deconditioning: Has completed physical therapy improving  History of prostate cancer in 2011, history of squamous cell carcinoma of the skin  Chronic conditions: Type 2 diabetes mellitus, hyperlipidemia, hypertension, hypothyroidism    PLAN:     CBC reviewed  Repeat CBC in 3 months  Reviewed normal flow cytometry with the patient  Patient requesting to begin donating platelets again as he did prior to his infection over the summer.  With 6 months of normal platelet counts I agreed that it would be okay for him to once again donate.  Follow-up 6 months  All questions answered

## 2024-08-30 ENCOUNTER — OFFICE VISIT (OUTPATIENT)
Dept: ONCOLOGY | Facility: CLINIC | Age: 84
End: 2024-08-30
Payer: MEDICARE

## 2024-08-30 ENCOUNTER — LAB (OUTPATIENT)
Dept: LAB | Facility: HOSPITAL | Age: 84
End: 2024-08-30
Payer: MEDICARE

## 2024-08-30 VITALS
SYSTOLIC BLOOD PRESSURE: 134 MMHG | OXYGEN SATURATION: 96 % | WEIGHT: 203 LBS | BODY MASS INDEX: 34.66 KG/M2 | HEIGHT: 64 IN | DIASTOLIC BLOOD PRESSURE: 70 MMHG | HEART RATE: 72 BPM

## 2024-08-30 DIAGNOSIS — D69.6 THROMBOCYTOPENIA: Primary | ICD-10-CM

## 2024-08-30 LAB
BASOPHILS # BLD AUTO: 0.02 10*3/MM3 (ref 0–0.2)
BASOPHILS NFR BLD AUTO: 0.4 % (ref 0–1.5)
DEPRECATED RDW RBC AUTO: 44.7 FL (ref 37–54)
EOSINOPHIL # BLD AUTO: 0.25 10*3/MM3 (ref 0–0.4)
EOSINOPHIL NFR BLD AUTO: 5.5 % (ref 0.3–6.2)
ERYTHROCYTE [DISTWIDTH] IN BLOOD BY AUTOMATED COUNT: 12.8 % (ref 12.3–15.4)
HCT VFR BLD AUTO: 41.9 % (ref 37.5–51)
HGB BLD-MCNC: 13.6 G/DL (ref 13–17.7)
HOLD SPECIMEN: NORMAL
HOLD SPECIMEN: NORMAL
LYMPHOCYTES # BLD AUTO: 0.42 10*3/MM3 (ref 0.7–3.1)
LYMPHOCYTES NFR BLD AUTO: 9.2 % (ref 19.6–45.3)
MCH RBC QN AUTO: 31.6 PG (ref 26.6–33)
MCHC RBC AUTO-ENTMCNC: 32.5 G/DL (ref 31.5–35.7)
MCV RBC AUTO: 97.2 FL (ref 79–97)
MONOCYTES # BLD AUTO: 0.36 10*3/MM3 (ref 0.1–0.9)
MONOCYTES NFR BLD AUTO: 7.9 % (ref 5–12)
NEUTROPHILS NFR BLD AUTO: 3.53 10*3/MM3 (ref 1.7–7)
NEUTROPHILS NFR BLD AUTO: 77 % (ref 42.7–76)
PLATELET # BLD AUTO: 127 10*3/MM3 (ref 140–450)
PMV BLD AUTO: 9.4 FL (ref 6–12)
RBC # BLD AUTO: 4.31 10*6/MM3 (ref 4.14–5.8)
WBC NRBC COR # BLD AUTO: 4.58 10*3/MM3 (ref 3.4–10.8)

## 2024-08-30 PROCEDURE — 36415 COLL VENOUS BLD VENIPUNCTURE: CPT

## 2024-08-30 PROCEDURE — 85025 COMPLETE CBC W/AUTO DIFF WBC: CPT

## 2024-09-23 DIAGNOSIS — M54.50 CHRONIC BILATERAL LOW BACK PAIN WITHOUT SCIATICA: ICD-10-CM

## 2024-09-23 DIAGNOSIS — G89.29 CHRONIC BILATERAL LOW BACK PAIN WITHOUT SCIATICA: ICD-10-CM

## 2024-09-23 RX ORDER — TRAMADOL HYDROCHLORIDE 50 MG/1
50 TABLET ORAL EVERY 8 HOURS PRN
Qty: 90 TABLET | Refills: 0 | Status: SHIPPED | OUTPATIENT
Start: 2024-09-23

## 2024-10-16 ENCOUNTER — CLINICAL SUPPORT (OUTPATIENT)
Dept: FAMILY MEDICINE CLINIC | Facility: CLINIC | Age: 84
End: 2024-10-16
Payer: MEDICARE

## 2024-10-16 DIAGNOSIS — M54.50 CHRONIC BILATERAL LOW BACK PAIN WITHOUT SCIATICA: Primary | ICD-10-CM

## 2024-10-16 DIAGNOSIS — G89.29 CHRONIC BILATERAL LOW BACK PAIN WITHOUT SCIATICA: Primary | ICD-10-CM

## 2024-10-16 DIAGNOSIS — Z23 NEEDS FLU SHOT: Primary | ICD-10-CM

## 2024-10-16 PROCEDURE — 90662 IIV NO PRSV INCREASED AG IM: CPT | Performed by: FAMILY MEDICINE

## 2024-10-16 PROCEDURE — G0008 ADMIN INFLUENZA VIRUS VAC: HCPCS | Performed by: FAMILY MEDICINE

## 2024-10-16 NOTE — PROGRESS NOTES
Injection  Injection performed in left deltoid by Scott Quezada MA. Patient tolerated the procedure well without complications.  10/16/24   Scott Quezada MA

## 2024-10-18 RX ORDER — HYDROCODONE BITARTRATE AND ACETAMINOPHEN 7.5; 325 MG/1; MG/1
1 TABLET ORAL EVERY 6 HOURS
Qty: 60 TABLET | Refills: 0 | Status: SHIPPED | OUTPATIENT
Start: 2024-10-18

## 2024-10-18 RX ORDER — PREDNISONE 10 MG/1
10 TABLET ORAL 2 TIMES DAILY
Qty: 10 TABLET | Refills: 0 | Status: SHIPPED | OUTPATIENT
Start: 2024-10-18

## 2024-12-02 ENCOUNTER — TELEPHONE (OUTPATIENT)
Dept: ONCOLOGY | Facility: CLINIC | Age: 84
End: 2024-12-02
Payer: MEDICARE

## 2024-12-02 NOTE — TELEPHONE ENCOUNTER
Caller: Mark Marie    Relationship to patient: Self    Best call back number: [phone]     Chief complaint: RESCHEDULE     Type of visit: LAB    Requested date: 12-3 AFTER 12:00     If rescheduling, when is the original appointment: 12-3      Additional notes:PLEASE ADVISE

## 2024-12-03 ENCOUNTER — LAB (OUTPATIENT)
Dept: LAB | Facility: HOSPITAL | Age: 84
End: 2024-12-03
Payer: MEDICARE

## 2024-12-03 DIAGNOSIS — D69.6 THROMBOCYTOPENIA: ICD-10-CM

## 2024-12-03 LAB
BASOPHILS # BLD AUTO: 0.01 10*3/MM3 (ref 0–0.2)
BASOPHILS NFR BLD AUTO: 0.2 % (ref 0–1.5)
DEPRECATED RDW RBC AUTO: 44.1 FL (ref 37–54)
EOSINOPHIL # BLD AUTO: 0.24 10*3/MM3 (ref 0–0.4)
EOSINOPHIL NFR BLD AUTO: 4.5 % (ref 0.3–6.2)
ERYTHROCYTE [DISTWIDTH] IN BLOOD BY AUTOMATED COUNT: 12.7 % (ref 12.3–15.4)
HCT VFR BLD AUTO: 44.2 % (ref 37.5–51)
HGB BLD-MCNC: 14.5 G/DL (ref 13–17.7)
LYMPHOCYTES # BLD AUTO: 0.45 10*3/MM3 (ref 0.7–3.1)
LYMPHOCYTES NFR BLD AUTO: 8.5 % (ref 19.6–45.3)
MCH RBC QN AUTO: 31.6 PG (ref 26.6–33)
MCHC RBC AUTO-ENTMCNC: 32.8 G/DL (ref 31.5–35.7)
MCV RBC AUTO: 96.3 FL (ref 79–97)
MONOCYTES # BLD AUTO: 0.44 10*3/MM3 (ref 0.1–0.9)
MONOCYTES NFR BLD AUTO: 8.3 % (ref 5–12)
NEUTROPHILS NFR BLD AUTO: 4.16 10*3/MM3 (ref 1.7–7)
NEUTROPHILS NFR BLD AUTO: 78.5 % (ref 42.7–76)
PLATELET # BLD AUTO: 223 10*3/MM3 (ref 140–450)
PMV BLD AUTO: 8.8 FL (ref 6–12)
RBC # BLD AUTO: 4.59 10*6/MM3 (ref 4.14–5.8)
WBC NRBC COR # BLD AUTO: 5.3 10*3/MM3 (ref 3.4–10.8)

## 2024-12-03 PROCEDURE — 85025 COMPLETE CBC W/AUTO DIFF WBC: CPT

## 2024-12-03 PROCEDURE — 36415 COLL VENOUS BLD VENIPUNCTURE: CPT

## 2025-01-04 DIAGNOSIS — M54.50 CHRONIC BILATERAL LOW BACK PAIN WITHOUT SCIATICA: ICD-10-CM

## 2025-01-04 DIAGNOSIS — G89.29 CHRONIC BILATERAL LOW BACK PAIN WITHOUT SCIATICA: ICD-10-CM

## 2025-01-06 RX ORDER — TRAMADOL HYDROCHLORIDE 50 MG/1
50 TABLET ORAL EVERY 8 HOURS PRN
Qty: 21 TABLET | Refills: 4 | Status: SHIPPED | OUTPATIENT
Start: 2025-01-06

## 2025-01-15 ENCOUNTER — TELEPHONE (OUTPATIENT)
Dept: FAMILY MEDICINE CLINIC | Facility: CLINIC | Age: 85
End: 2025-01-15
Payer: MEDICARE

## 2025-01-15 ENCOUNTER — TELEPHONE (OUTPATIENT)
Dept: FAMILY MEDICINE CLINIC | Facility: CLINIC | Age: 85
End: 2025-01-15

## 2025-01-15 DIAGNOSIS — G89.29 CHRONIC BILATERAL LOW BACK PAIN WITHOUT SCIATICA: ICD-10-CM

## 2025-01-15 DIAGNOSIS — E11.8 DISORDER ASSOCIATED WITH TYPE 2 DIABETES MELLITUS: Primary | ICD-10-CM

## 2025-01-15 DIAGNOSIS — M54.50 CHRONIC BILATERAL LOW BACK PAIN WITHOUT SCIATICA: ICD-10-CM

## 2025-01-15 RX ORDER — TRAMADOL HYDROCHLORIDE 50 MG/1
50 TABLET ORAL EVERY 8 HOURS PRN
Qty: 90 TABLET | Refills: 1 | Status: SHIPPED | OUTPATIENT
Start: 2025-01-15

## 2025-01-15 NOTE — TELEPHONE ENCOUNTER
I called and spoke to his pharmacy.  They said that his insurance will only allow him to get one week supply at at time.  The pharmacist said we could try to get a PA for this.  Can you please work on a PA so he can try to get 90 pills at a time?

## 2025-01-15 NOTE — TELEPHONE ENCOUNTER
Caller: Mark Marie    Relationship to patient: Self    Best call back number:   Telephone Information:   Mobile [phone]     *    Patient is needing: PATIENT STATES HE GETS LABS DONE ONCE A YEAR. HE IS SCHEDULED 2/25/25. PLEASE ENTER ORDERS AND CALL PATIENT TO SCHEDULE LABS. HE SAID HE CAN DO SAME DAY AS APPOINTMENT ALSO.

## 2025-01-15 NOTE — TELEPHONE ENCOUNTER
Caller: aMrk Marie    Relationship to patient: Self    Best call back number:   Telephone Information:   Mobile [phone]         Patient is needing: PATIENT HAS BEEN GETTING HIS TRAMADOL 50 MG TO BE TAKEN 3 TIMES A DAY WITH A 30 DAY SUPPLY BEING 90 PILLS. [facility] IS ONLY GIVING HIM 21 AT A TIME AND HE IS GOING BACK AND FORTH TO THE PHARMACY CONSTANTLY. THERE IS A NOTE ON LATEST REFILL THAT SAYS A DIAGNOSIS CODE IS NEEDED. PLEASE CALL  [id number] - [place], [place] - [address] AT [address] - [phone]  - [phone] FX    TO RESOLVE ISSUE WITH SUPPLY. PLEASE CALL PATIENT ONCE ISSUE IS RESOLVED WITH PHARMACY

## 2025-02-17 ENCOUNTER — LAB (OUTPATIENT)
Dept: FAMILY MEDICINE CLINIC | Facility: CLINIC | Age: 85
End: 2025-02-17
Payer: MEDICARE

## 2025-02-17 LAB
ALBUMIN SERPL-MCNC: 3.8 G/DL (ref 3.5–5.2)
ALBUMIN UR-MCNC: <1.2 MG/DL
ALBUMIN/GLOB SERPL: 1.2 G/DL
ALP SERPL-CCNC: 68 U/L (ref 39–117)
ALT SERPL W P-5'-P-CCNC: 14 U/L (ref 1–41)
ANION GAP SERPL CALCULATED.3IONS-SCNC: 8.9 MMOL/L (ref 5–15)
AST SERPL-CCNC: 19 U/L (ref 1–40)
BILIRUB SERPL-MCNC: 0.3 MG/DL (ref 0–1.2)
BUN SERPL-MCNC: 16 MG/DL (ref 8–23)
BUN/CREAT SERPL: 17.2 (ref 7–25)
CALCIUM SPEC-SCNC: 9.5 MG/DL (ref 8.6–10.5)
CHLORIDE SERPL-SCNC: 103 MMOL/L (ref 98–107)
CHOLEST SERPL-MCNC: 198 MG/DL (ref 0–200)
CO2 SERPL-SCNC: 26.1 MMOL/L (ref 22–29)
CREAT SERPL-MCNC: 0.93 MG/DL (ref 0.76–1.27)
CREAT UR-MCNC: 130 MG/DL
EGFRCR SERPLBLD CKD-EPI 2021: 82 ML/MIN/1.73
GLOBULIN UR ELPH-MCNC: 3.1 GM/DL
GLUCOSE SERPL-MCNC: 122 MG/DL (ref 65–99)
HBA1C MFR BLD: 6.1 % (ref 4.8–5.6)
HDLC SERPL-MCNC: 61 MG/DL (ref 40–60)
LDLC SERPL CALC-MCNC: 109 MG/DL (ref 0–100)
LDLC/HDLC SERPL: 1.72 {RATIO}
MICROALBUMIN/CREAT UR: NORMAL MG/G{CREAT}
POTASSIUM SERPL-SCNC: 4.2 MMOL/L (ref 3.5–5.2)
PROT SERPL-MCNC: 6.9 G/DL (ref 6–8.5)
SODIUM SERPL-SCNC: 138 MMOL/L (ref 136–145)
TRIGL SERPL-MCNC: 161 MG/DL (ref 0–150)
VLDLC SERPL-MCNC: 28 MG/DL (ref 5–40)

## 2025-02-17 PROCEDURE — 83036 HEMOGLOBIN GLYCOSYLATED A1C: CPT | Performed by: FAMILY MEDICINE

## 2025-02-17 PROCEDURE — 82043 UR ALBUMIN QUANTITATIVE: CPT | Performed by: FAMILY MEDICINE

## 2025-02-17 PROCEDURE — 80053 COMPREHEN METABOLIC PANEL: CPT | Performed by: FAMILY MEDICINE

## 2025-02-17 PROCEDURE — 80061 LIPID PANEL: CPT | Performed by: FAMILY MEDICINE

## 2025-02-17 PROCEDURE — 82570 ASSAY OF URINE CREATININE: CPT | Performed by: FAMILY MEDICINE

## 2025-02-25 ENCOUNTER — OFFICE VISIT (OUTPATIENT)
Dept: FAMILY MEDICINE CLINIC | Facility: CLINIC | Age: 85
End: 2025-02-25
Payer: MEDICARE

## 2025-02-25 VITALS
HEART RATE: 88 BPM | OXYGEN SATURATION: 94 % | DIASTOLIC BLOOD PRESSURE: 73 MMHG | WEIGHT: 208 LBS | SYSTOLIC BLOOD PRESSURE: 132 MMHG | HEIGHT: 64 IN | BODY MASS INDEX: 35.51 KG/M2 | TEMPERATURE: 98.4 F | RESPIRATION RATE: 18 BRPM

## 2025-02-25 DIAGNOSIS — Z00.00 MEDICARE ANNUAL WELLNESS VISIT, SUBSEQUENT: Primary | ICD-10-CM

## 2025-02-25 PROBLEM — J18.9 PNEUMONIA DUE TO INFECTIOUS ORGANISM: Status: RESOLVED | Noted: 2023-06-07 | Resolved: 2025-02-25

## 2025-02-25 PROBLEM — Z11.59 NEED FOR HEPATITIS C SCREENING TEST: Status: RESOLVED | Noted: 2022-07-13 | Resolved: 2025-02-25

## 2025-02-25 PROBLEM — R06.6 HICCUPS: Chronic | Status: RESOLVED | Noted: 2023-06-12 | Resolved: 2025-02-25

## 2025-02-25 PROBLEM — S67.192A CRUSHING INJURY OF RIGHT MIDDLE FINGER: Status: RESOLVED | Noted: 2022-07-13 | Resolved: 2025-02-25

## 2025-02-25 PROBLEM — J40 BRONCHITIS: Status: RESOLVED | Noted: 2023-11-20 | Resolved: 2025-02-25

## 2025-02-25 PROCEDURE — 1170F FXNL STATUS ASSESSED: CPT | Performed by: FAMILY MEDICINE

## 2025-02-25 PROCEDURE — 3075F SYST BP GE 130 - 139MM HG: CPT | Performed by: FAMILY MEDICINE

## 2025-02-25 PROCEDURE — 1126F AMNT PAIN NOTED NONE PRSNT: CPT | Performed by: FAMILY MEDICINE

## 2025-02-25 PROCEDURE — 3078F DIAST BP <80 MM HG: CPT | Performed by: FAMILY MEDICINE

## 2025-02-25 PROCEDURE — G0439 PPPS, SUBSEQ VISIT: HCPCS | Performed by: FAMILY MEDICINE

## 2025-02-25 NOTE — PATIENT INSTRUCTIONS
Medicare Wellness  Personal Prevention Plan of Service     Date of Office Visit:    Encounter Provider:  Diana Linares MD  Place of Service:  [facility]  Patient Name: Mark Marie  :  1940    As part of the Medicare Wellness portion of your visit today, we are providing you with this personalized preventive plan of services (PPPS). This plan is based upon recommendations of the [place] Preventive Services Task Force (USPSTF) and the Advisory Committee on Immunization Practices (ACIP).    This lists the preventive care services that should be considered, and provides dates of when you are due. Items listed as completed are up-to-date and do not require any further intervention.    Health Maintenance   Topic Date Due    DIABETIC FOOT EXAM  Never done    URINE MICROALBUMIN-CREATININE RATIO (uACR)  Never done    TDAP/TD VACCINES (1 - Tdap) Never done    ZOSTER VACCINE (1 of 2) Never done    RSV Vaccine - Adults (1 - 1-dose 75+ series) Never done    DIABETIC EYE EXAM  2019    COVID-19 Vaccine (2024- season) 2024    BMI FOLLOWUP  2025    HEMOGLOBIN A1C  2025    LIPID PANEL  2026    ANNUAL WELLNESS VISIT  2026    INFLUENZA VACCINE  Completed    Pneumococcal Vaccine 50+  Completed       No orders of the defined types were placed in this encounter.      Return in about 6 months (around 2025).

## 2025-02-25 NOTE — PROGRESS NOTES
Subjective   The ABCs of the Annual Wellness Visit  Medicare Wellness Visit      Mark Marie is a 84 y.o. patient who presents for a Medicare Wellness Visit.    The following portions of the patient's history were reviewed and   updated as appropriate: allergies, current medications, past family history, past medical history, past social history, past surgical history, and problem list.    Compared to one year ago, the patient's physical   health is the same.  Compared to one year ago, the patient's mental   health is the same.    Recent Hospitalizations:  He was not admitted to the hospital during the last year.     Current Medical Providers:  Patient Care Team:  Diana Linares MD as PCP - General (Family Medicine)  Abhijit Jones MD as Consulting Physician (Urology)  Cleo Gabriel APRN as Nurse Practitioner (Hematology)  Hallie Valencia MD as Consulting Physician (Hematology and Oncology)    Outpatient Medications Prior to Visit   Medication Sig Dispense Refill    clindamycin (CLEOCIN) 150 MG capsule       donepezil (ARICEPT) 5 MG tablet TAKE 1 TABLET BY MOUTH EVERY DAY AT NIGHT 90 tablet 3    HYDROcodone-acetaminophen (NORCO) 7.5-325 MG per tablet Take 1 tablet by mouth Every 6 (Six) Hours. 60 tablet 0    levothyroxine (SYNTHROID, LEVOTHROID) 25 MCG tablet TAKE 1 TABLET BY MOUTH EVERY DAY 90 tablet 3    losartan (COZAAR) 50 MG tablet TAKE 1 TABLET BY MOUTH EVERY DAY 90 tablet 3    metFORMIN (GLUCOPHAGE) 500 MG tablet TAKE 1 TABLET BY MOUTH TWICE A  tablet 3    predniSONE (DELTASONE) 10 MG tablet Take 1 tablet by mouth 2 (Two) Times a Day. 10 tablet 0    simvastatin (ZOCOR) 80 MG tablet TAKE 1/2 TABLET BY MOUTH EVERY DAY 45 tablet 7    tamsulosin (FLOMAX) 0.4 MG capsule 24 hr capsule Take 1 capsule by mouth Daily.      traMADol (ULTRAM) 50 MG tablet Take 1 tablet by mouth Every 8 (Eight) Hours As Needed for Moderate Pain. 90 tablet 1     No facility-administered medications prior to  "visit.     Opioid medication/s are on active medication list.  and I have evaluated his active treatment plan and pain score trends (see table).  Vitals:    02/25/25 1323   PainSc: 0-No pain     I have reviewed the chart for potential of high risk medication and harmful drug interactions in the elderly.        Aspirin is not on active medication list.  Aspirin use is not indicated based on review of current medical condition/s. Risk of harm outweighs potential benefits.  .    Patient Active Problem List   Diagnosis    Right calf pain    Disorder associated with type 2 diabetes mellitus    Hyperlipidemia    Hypertension    Hypothyroidism    Chronic bilateral low back pain without sciatica    Memory change    Medicare annual wellness visit, subsequent    Clinical diagnosis of COVID-19    Lumbar radiculopathy    Basal cell carcinoma, face    Fatigue    Dyspnea    Thrombocytopenia    Elevated liver transaminase level    History of prostate cancer    Ehrlichiosis, unspecified    Obesity (BMI 30-39.9)    Nasal congestion     Advance Care Planning Advance Directive is on file.  ACP discussion was held with the patient during this visit. Patient has an advance directive in EMR which is still valid.             Objective   Vitals:    02/25/25 1323   BP: 132/73   BP Location: Right arm   Patient Position: Sitting   Cuff Size: Large Adult   Pulse: 88   Resp: 18   Temp: 98.4 °F (36.9 °C)   TempSrc: Temporal   SpO2: 94%   Weight: 94.3 kg (208 lb)   Height: 162.6 cm (64\")   PainSc: 0-No pain       Estimated body mass index is 35.7 kg/m² as calculated from the following:    Height as of this encounter: 162.6 cm (64\").    Weight as of this encounter: 94.3 kg (208 lb).     Physical Exam  Vitals and nursing note reviewed.   Constitutional:       General: He is not in acute distress.     Appearance: He is well-developed.   HENT:      Head: Normocephalic.   Eyes:      General: Lids are normal.      Conjunctiva/sclera: Conjunctivae " normal.   Neck:      Thyroid: No thyroid mass or thyromegaly.      Trachea: Trachea normal.   Cardiovascular:      Rate and Rhythm: Normal rate and regular rhythm.      Heart sounds: Normal heart sounds.   Pulmonary:      Effort: Pulmonary effort is normal.      Breath sounds: Normal breath sounds.   Musculoskeletal:      Cervical back: Normal range of motion.      Right lower leg: No edema.      Left lower leg: No edema.   Lymphadenopathy:      Cervical: No cervical adenopathy.   Skin:     General: Skin is warm and dry.   Neurological:      Mental Status: He is alert and oriented to person, place, and time.   Psychiatric:         Attention and Perception: He is attentive.         Mood and Affect: Mood normal.         Speech: Speech normal.         Behavior: Behavior normal.                  Does the patient have evidence of cognitive impairment? No  Lab Results   Component Value Date    TRIG 161 (H) 2025    HDL 61 (H) 2025     (H) 2025    VLDL 28 2025    HGBA1C 6.10 (H) 2025                                                                                               Health  Risk Assessment    Smoking Status:  Social History     Tobacco Use   Smoking Status Never   Smokeless Tobacco Never     Alcohol Consumption:  Social History     Substance and Sexual Activity   Alcohol Use Not Currently    Comment: rarely;  caffeine 1c qd       Fall Risk Screen  STEADI Fall Risk Assessment was completed, and patient is at LOW risk for falls.Assessment completed on:2025    Depression Screening   Little interest or pleasure in doing things? Not at all   Feeling down, depressed, or hopeless? Not at all   PHQ-2 Total Score 0      Health Habits and Functional and Cognitive Screenin/25/2025     1:21 PM   Functional & Cognitive Status   Do you have difficulty preparing food and eating? No   Do you have difficulty bathing yourself, getting dressed or grooming yourself? No   Do you have  difficulty using the toilet? No   Do you have difficulty moving around from place to place? No   Do you have trouble with steps or getting out of a bed or a chair? No   Current Diet Well Balanced Diet   Dental Exam Up to date   Eye Exam Up to date   Exercise (times per week) 0 times per week   Current Exercises Include No Regular Exercise   Do you need help using the phone?  No   Are you deaf or do you have serious difficulty hearing?  No   Do you need help to go to places out of walking distance? No   Do you need help shopping? No   Do you need help preparing meals?  No   Do you need help with housework?  No   Do you need help with laundry? No   Do you need help taking your medications? No   Do you need help managing money? No   Do you ever drive or ride in a car without wearing a seat belt? No   Have you felt unusual stress, anger or loneliness in the last month? Yes   Who do you live with? Spouse   If you need help, do you have trouble finding someone available to you? No   Have you been bothered in the last four weeks by sexual problems? No   Do you have difficulty concentrating, remembering or making decisions? No           Age-appropriate Screening Schedule:  Refer to the list below for future screening recommendations based on patient's age, sex and/or medical conditions. Orders for these recommended tests are listed in the plan section. The patient has been provided with a written plan.    Health Maintenance List  Health Maintenance   Topic Date Due    DIABETIC FOOT EXAM  Never done    URINE MICROALBUMIN-CREATININE RATIO (uACR)  Never done    TDAP/TD VACCINES (1 - Tdap) Never done    ZOSTER VACCINE (1 of 2) Never done    RSV Vaccine - Adults (1 - 1-dose 75+ series) Never done    DIABETIC EYE EXAM  06/06/2019    COVID-19 Vaccine (5 - 2024-25 season) 09/01/2024    BMI FOLLOWUP  05/14/2025    HEMOGLOBIN A1C  08/17/2025    LIPID PANEL  02/17/2026    ANNUAL WELLNESS VISIT  02/25/2026    INFLUENZA VACCINE   Completed    Pneumococcal Vaccine 50+  Completed                                                                                                                                                CMS Preventative Services Quick Reference  Risk Factors Identified During Encounter  Inactivity/Sedentary: Patient was advised to exercise at least 150 minutes a week per CDC recommendations.  Dental Screening Recommended  Vision Screening Recommended    The above risks/problems have been discussed with the patient.  Pertinent information has been shared with the patient in the After Visit Summary.  An After Visit Summary and PPPS were made available to the patient.    Follow Up:   Next Medicare Wellness visit to be scheduled in 1 year.     Assessment & Plan  Medicare annual wellness visit, subsequent              Follow Up:   No follow-ups on file.

## 2025-03-13 DIAGNOSIS — R41.3 MEMORY CHANGE: ICD-10-CM

## 2025-03-13 NOTE — TELEPHONE ENCOUNTER
Rx Refill Note  Requested Prescriptions     Pending Prescriptions Disp Refills    donepezil (ARICEPT) 5 MG tablet [Pharmacy Med Name: DONEPEZIL HCL 5 MG TABLET] 90 tablet 3     Sig: TAKE 1 TABLET BY MOUTH EVERY DAY AT NIGHT    levothyroxine (SYNTHROID, LEVOTHROID) 25 MCG tablet [Pharmacy Med Name: LEVOTHYROXINE 25 MCG TABLET] 90 tablet 3     Sig: TAKE 1 TABLET BY MOUTH EVERY DAY    losartan (COZAAR) 50 MG tablet [Pharmacy Med Name: LOSARTAN POTASSIUM 50 MG TAB] 90 tablet 3     Sig: TAKE 1 TABLET BY MOUTH EVERY DAY      Last office visit with prescribing clinician: 2/25/2025   Last telemedicine visit with prescribing clinician: Visit date not found   Next office visit with prescribing clinician: Visit date not found                         Would you like a call back once the refill request has been completed: [] Yes [] No    If the office needs to give you a call back, can they leave a voicemail: [] Yes [] No    Addis Orozco MA  03/13/25, 10:44 EDT

## 2025-03-13 NOTE — PROGRESS NOTES
Hematology/Oncology Outpatient Follow Up    PATIENT NAME:Mark Marie  :1940  MRN: [id number]  PRIMARY CARE PHYSICIAN: Diana Linares MD  REFERRING PHYSICIAN: Diana Linares MD    Chief Complaint   Patient presents with    Follow-up     Thrombocytopenia        HISTORY OF PRESENT ILLNESS:       Mark Marie is a 83 y.o. male who presented to [facility] on 2023 at the direction of his primary care physician with complaints of fever, chills, weakness and falls.  Past medical history of type 2 diabetes mellitus, hypertension, hyperlipidemia, hypothyroidism, dementia prostate cancer in , squamous cell carcinoma of skin.  Patient reported he had been feeling poorly for over a week.  In addition to fever and chills he also reported nausea and diarrhea.  Intermittent episodes of dysphagia, difficulty breathing, hiccups and congestion.  Denied any abdominal pain or urinary symptoms.     Evaluation in the ED: WBC 3.90, hemoglobin 15 g/dL, MCV 91.8, platelets 43,000, bands 22%, lymphocytes 5%, plasma cells 1%.  BUN 27, creatinine 1.29, , ALT 53, negative respiratory panel, chest x-ray showing no acute cardiopulmonary disease, blood cultures drawn and pending, CT of the head without contrast showing no acute intracranial abnormality; age-appropriate volume loss; mild to moderate amount of low-density periventricular and subcortical white matter most commonly seen with chronic small vessel ischemic change.  UA positive for large amount of blood, trace leukocytes CT of the abdomen and pelvis without contrast showed possible enteritis, diverticulosis without diverticulitis, small hiatal hernia, hepatomegaly.  PT 12.0, INR 1.13.     23  Hematology/Oncology was consulted for acute thrombocytopenia.  Patient had platelets presented with platelet count of 43,000 with bandemia.  He also has had a febrile illness no obvious source at this time..  Patient has a history of underlying mild  dementia    1/3/2024: WBC 4.81, hemoglobin 14.1, MCV 94.2, platelets 216,000, lymphocytes 11.4%, kappa FLC 29.7, kappa lambda ratio 1.53, flow with no significant immunophenotypic abnormality detected, M spike not observed, IgG, IgA, IgM normal.     He/She  has a past medical history of Cataract (removed 16 years ago), Diabetes mellitus, HL (hearing loss) (have worn hearing aids for 16 years), Hyperlipidemia, Hypertension, Hypothyroidism, Joint pain, Low back pain, Prostate cancer (2011), and Squamous cell carcinoma of skin.     PCP: Diana Linares MD    Past Medical History:   Diagnosis Date    Cataract removed 16 years ago    Diabetes mellitus     Ehrlichiosis, unspecified 6/12/2023    Hiccups 6/12/2023    HL (hearing loss) have worn hearing aids for 16 years    Hyperlipidemia     Hypertension     Hypothyroidism     Joint pain     Low back pain     Non-traumatic rhabdomyolysis 06/09/2023    Obesity (BMI 30-39.9) 6/12/2023    Prostate cancer 2011    Squamous cell carcinoma of skin     right temple       Past Surgical History:   Procedure Laterality Date    HEMORROIDECTOMY      HERNIA REPAIR      inguinal and umbilical    KNEE SURGERY Bilateral     trimmed cartilage    VASECTOMY  50 years ago         Current Outpatient Medications:     clindamycin (CLEOCIN) 150 MG capsule, , Disp: , Rfl:     donepezil (ARICEPT) 5 MG tablet, TAKE 1 TABLET BY MOUTH EVERY DAY AT NIGHT, Disp: 90 tablet, Rfl: 3    HYDROcodone-acetaminophen (NORCO) 7.5-325 MG per tablet, Take 1 tablet by mouth Every 6 (Six) Hours., Disp: 60 tablet, Rfl: 0    levothyroxine (SYNTHROID, LEVOTHROID) 25 MCG tablet, TAKE 1 TABLET BY MOUTH EVERY DAY, Disp: 90 tablet, Rfl: 3    losartan (COZAAR) 50 MG tablet, TAKE 1 TABLET BY MOUTH EVERY DAY, Disp: 90 tablet, Rfl: 3    metFORMIN (GLUCOPHAGE) 500 MG tablet, TAKE 1 TABLET BY MOUTH TWICE A DAY, Disp: 180 tablet, Rfl: 3    predniSONE (DELTASONE) 10 MG tablet, Take 1 tablet by mouth 2 (Two) Times a Day., Disp: 10  tablet, Rfl: 0    simvastatin (ZOCOR) 80 MG tablet, TAKE 1/2 TABLET BY MOUTH EVERY DAY, Disp: 45 tablet, Rfl: 7    tamsulosin (FLOMAX) 0.4 MG capsule 24 hr capsule, Take 1 capsule by mouth Daily., Disp: , Rfl:     traMADol (ULTRAM) 50 MG tablet, Take 1 tablet by mouth Every 8 (Eight) Hours As Needed for Moderate Pain., Disp: 90 tablet, Rfl: 1    Allergies   Allergen Reactions    Ramipril Swelling     Leg swelling       Family History   Problem Relation Age of Onset    Hypertension Mother     Heart disease Father     Hypertension Father     Cancer Brother         prostate    Cancer Brother         prostate    Hypertension Brother     Cancer Daughter        Cancer-related family history includes Cancer in his brother, brother, and daughter.    Social History     Tobacco Use    Smoking status: Never     Passive exposure: Never    Smokeless tobacco: Never   Vaping Use    Vaping status: Never Used   Substance Use Topics    Alcohol use: Not Currently     Comment: rarely;  caffeine 1c qd    Drug use: No     I have reviewed and confirmed the accuracy of the patient's history: Chief complaint, HPI, ROS, and Subjective as entered by the MA/LPN/RN.  03/14/25          SUBJECTIVE:    3/14/2025: Geraldo is here today for routine follow-up.  He reports overall feeling well.  He did just lose a cousin is continuing to grieve but otherwise has no concerns or complaints today.  He continues to donate platelets.  He has had no bleeding concerns.        REVIEW OF SYSTEMS:    Review of Systems   Constitutional: Negative.    HENT: Negative.     Eyes: Negative.    Respiratory: Negative.     Cardiovascular: Negative.    Gastrointestinal: Negative.    Genitourinary: Negative.    Musculoskeletal: Negative.    Skin: Negative.    Neurological: Negative.    Hematological: Negative.    Psychiatric/Behavioral: Negative.         OBJECTIVE:    Vitals:    03/14/25 1040   BP: 143/82   Pulse: 83   Temp: 98.4 °F (36.9 °C)   SpO2: 98%   Weight: 94.3 kg  "(208 lb)   Height: 162.6 cm (64.02\")   PainSc: 0-No pain           Body mass index is 35.69 kg/m².    ECOG  (0) Fully active, able to carry on all predisease performance without restriction    Physical Exam  Vitals reviewed.   Constitutional:       Appearance: Normal appearance. He is not ill-appearing.   HENT:      Head: Normocephalic and atraumatic.   Eyes:      General: No scleral icterus.     Pupils: Pupils are equal, round, and reactive to light.   Cardiovascular:      Rate and Rhythm: Normal rate and regular rhythm.      Pulses: Normal pulses.      Heart sounds: No murmur heard.  Pulmonary:      Effort: Pulmonary effort is normal.      Breath sounds: Normal breath sounds.   Abdominal:      General: There is no distension.      Palpations: Abdomen is soft. There is no mass.      Tenderness: There is no abdominal tenderness.   Musculoskeletal:         General: No swelling. Normal range of motion.      Cervical back: Normal range of motion and neck supple.   Skin:     General: Skin is warm.      Coloration: Skin is not jaundiced or pale.      Findings: No bruising, erythema, lesion or rash.   Neurological:      General: No focal deficit present.      Mental Status: He is alert and oriented to person, place, and time.      Motor: No weakness.   Psychiatric:         Mood and Affect: Mood normal.         Behavior: Behavior normal.         Thought Content: Thought content normal.         Judgment: Judgment normal.     I have reexamined the patient and the results are consistent with the previously documented exam.        RECENT LABS  WBC   Date Value Ref Range Status   03/14/2025 5.46 3.40 - 10.80 10*3/mm3 Final     RBC   Date Value Ref Range Status   03/14/2025 4.77 4.14 - 5.80 10*6/mm3 Final     Hemoglobin   Date Value Ref Range Status   03/14/2025 15.0 13.0 - 17.7 g/dL Final     Hematocrit   Date Value Ref Range Status   03/14/2025 45.0 37.5 - 51.0 % Final     MCV   Date Value Ref Range Status   03/14/2025 94.3 " 79.0 - 97.0 fL Final     MCH   Date Value Ref Range Status   03/14/2025 31.4 26.6 - 33.0 pg Final     MCHC   Date Value Ref Range Status   03/14/2025 33.3 31.5 - 35.7 g/dL Final     RDW   Date Value Ref Range Status   03/14/2025 12.5 12.3 - 15.4 % Final     RDW-SD   Date Value Ref Range Status   03/14/2025 42.2 37.0 - 54.0 fl Final     MPV   Date Value Ref Range Status   03/14/2025 8.6 6.0 - 12.0 fL Final     Platelets   Date Value Ref Range Status   03/14/2025 263 140 - 450 10*3/mm3 Final     Neutrophil %   Date Value Ref Range Status   03/14/2025 79.6 (H) 42.7 - 76.0 % Final     Lymphocyte %   Date Value Ref Range Status   03/14/2025 7.9 (L) 19.6 - 45.3 % Final     Monocyte %   Date Value Ref Range Status   03/14/2025 8.1 5.0 - 12.0 % Final     Eosinophil %   Date Value Ref Range Status   03/14/2025 4.0 0.3 - 6.2 % Final     Basophil %   Date Value Ref Range Status   03/14/2025 0.4 0.0 - 1.5 % Final     Immature Grans %   Date Value Ref Range Status   07/21/2023 0.2 0.0 - 0.5 % Final     Neutrophils, Absolute   Date Value Ref Range Status   03/14/2025 4.35 1.70 - 7.00 10*3/mm3 Final     Lymphocytes, Absolute   Date Value Ref Range Status   03/14/2025 0.43 (L) 0.70 - 3.10 10*3/mm3 Final     Monocytes, Absolute   Date Value Ref Range Status   03/14/2025 0.44 0.10 - 0.90 10*3/mm3 Final     Eosinophils, Absolute   Date Value Ref Range Status   03/14/2025 0.22 0.00 - 0.40 10*3/mm3 Final     Basophils, Absolute   Date Value Ref Range Status   03/14/2025 0.02 0.00 - 0.20 10*3/mm3 Final     Immature Grans, Absolute   Date Value Ref Range Status   07/21/2023 0.01 0.00 - 0.05 10*3/mm3 Final     nRBC   Date Value Ref Range Status   07/21/2023 0.0 0.0 - 0.2 /100 WBC Final       Lab Results   Component Value Date    GLUCOSE 122 (H) 02/17/2025    BUN 16 02/17/2025    CREATININE 0.93 02/17/2025    EGFRIFNONA 76 09/20/2021    BCR 17.2 02/17/2025    K 4.2 02/17/2025    CO2 26.1 02/17/2025    CALCIUM 9.5 02/17/2025    ALBUMIN 3.8  02/17/2025    AST 19 02/17/2025    ALT 14 02/17/2025         ASSESSMENT:    Acute thrombocytopenia: Resolved while in the hospital.  Patient with a platelet count of 43,000 upon presentation to the hospital.  Work-up was negative for nutritional deficiency or hemolysis.  He had a very elevated LDH and a low reticulocyte count suggesting bone marrow suppression.  Peripheral smear was negative for blasts.  No M spike noted on SPEP.  Overall counts were recovering very well.  He was noted to have up to 14% blasts on his differential and therefore we sent his blood out for flow cytometry which showed circulating monoclonal plasma cells 0.3% of leukocytes his subsequent FISH testing was normal.  On repeat flow cytometry is normal.  Mild thrombocytopenia.  Platelets have normalized.  He continues to donate blood and platelets every 2 to 4 weeks.  [place] spotted fever: Febrile illness: Resolved on IV doxycycline.    Weakness and deconditioning: Has completed physical therapy.  Approved  History of prostate cancer in 2011, history of squamous cell carcinoma of the skin  Chronic conditions: Type 2 diabetes mellitus, hyperlipidemia, hypertension, hypothyroidism  Assessment has been reviewed and updated    PLAN:     CBC reviewed  Patient requesting to begin donating platelets again as he did prior to his infection- With 6 months of normal platelet counts I agreed that it would be okay for him to once again donate. He continues to donate platelets every 2-4 weeks.   Follow-up with Dr. Valencia in 6 months    Electronically signed by Cherry Galvez PA-C

## 2025-03-14 ENCOUNTER — OFFICE VISIT (OUTPATIENT)
Dept: ONCOLOGY | Facility: CLINIC | Age: 85
End: 2025-03-14
Payer: MEDICARE

## 2025-03-14 ENCOUNTER — LAB (OUTPATIENT)
Dept: LAB | Facility: HOSPITAL | Age: 85
End: 2025-03-14
Payer: MEDICARE

## 2025-03-14 VITALS
SYSTOLIC BLOOD PRESSURE: 143 MMHG | HEIGHT: 64 IN | WEIGHT: 208 LBS | HEART RATE: 83 BPM | OXYGEN SATURATION: 98 % | BODY MASS INDEX: 35.51 KG/M2 | TEMPERATURE: 98.4 F | DIASTOLIC BLOOD PRESSURE: 82 MMHG

## 2025-03-14 DIAGNOSIS — D69.6 THROMBOCYTOPENIA: Primary | ICD-10-CM

## 2025-03-14 LAB
ALBUMIN SERPL-MCNC: 4.1 G/DL (ref 3.5–5.2)
ALBUMIN/GLOB SERPL: 1.6 G/DL
ALP SERPL-CCNC: 66 U/L (ref 39–117)
ALT SERPL W P-5'-P-CCNC: 15 U/L (ref 1–41)
ANION GAP SERPL CALCULATED.3IONS-SCNC: 11.9 MMOL/L (ref 5–15)
AST SERPL-CCNC: 17 U/L (ref 1–40)
BASOPHILS # BLD AUTO: 0.02 10*3/MM3 (ref 0–0.2)
BASOPHILS NFR BLD AUTO: 0.4 % (ref 0–1.5)
BILIRUB SERPL-MCNC: 0.3 MG/DL (ref 0–1.2)
BUN SERPL-MCNC: 16 MG/DL (ref 8–23)
BUN/CREAT SERPL: 14.5 (ref 7–25)
CALCIUM SPEC-SCNC: 9.8 MG/DL (ref 8.6–10.5)
CHLORIDE SERPL-SCNC: 104 MMOL/L (ref 98–107)
CO2 SERPL-SCNC: 26.1 MMOL/L (ref 22–29)
CREAT SERPL-MCNC: 1.1 MG/DL (ref 0.76–1.27)
DEPRECATED RDW RBC AUTO: 42.2 FL (ref 37–54)
EGFRCR SERPLBLD CKD-EPI 2021: 67 ML/MIN/1.73
EOSINOPHIL # BLD AUTO: 0.22 10*3/MM3 (ref 0–0.4)
EOSINOPHIL NFR BLD AUTO: 4 % (ref 0.3–6.2)
ERYTHROCYTE [DISTWIDTH] IN BLOOD BY AUTOMATED COUNT: 12.5 % (ref 12.3–15.4)
GLOBULIN UR ELPH-MCNC: 2.6 GM/DL
GLUCOSE SERPL-MCNC: 167 MG/DL (ref 65–99)
HCT VFR BLD AUTO: 45 % (ref 37.5–51)
HGB BLD-MCNC: 15 G/DL (ref 13–17.7)
HOLD SPECIMEN: NORMAL
LYMPHOCYTES # BLD AUTO: 0.43 10*3/MM3 (ref 0.7–3.1)
LYMPHOCYTES NFR BLD AUTO: 7.9 % (ref 19.6–45.3)
MCH RBC QN AUTO: 31.4 PG (ref 26.6–33)
MCHC RBC AUTO-ENTMCNC: 33.3 G/DL (ref 31.5–35.7)
MCV RBC AUTO: 94.3 FL (ref 79–97)
MONOCYTES # BLD AUTO: 0.44 10*3/MM3 (ref 0.1–0.9)
MONOCYTES NFR BLD AUTO: 8.1 % (ref 5–12)
NEUTROPHILS NFR BLD AUTO: 4.35 10*3/MM3 (ref 1.7–7)
NEUTROPHILS NFR BLD AUTO: 79.6 % (ref 42.7–76)
PLATELET # BLD AUTO: 263 10*3/MM3 (ref 140–450)
PMV BLD AUTO: 8.6 FL (ref 6–12)
POTASSIUM SERPL-SCNC: 4.3 MMOL/L (ref 3.5–5.2)
PROT SERPL-MCNC: 6.7 G/DL (ref 6–8.5)
RBC # BLD AUTO: 4.77 10*6/MM3 (ref 4.14–5.8)
SODIUM SERPL-SCNC: 142 MMOL/L (ref 136–145)
WBC NRBC COR # BLD AUTO: 5.46 10*3/MM3 (ref 3.4–10.8)

## 2025-03-14 PROCEDURE — 36415 COLL VENOUS BLD VENIPUNCTURE: CPT

## 2025-03-14 PROCEDURE — 80053 COMPREHEN METABOLIC PANEL: CPT | Performed by: NURSE PRACTITIONER

## 2025-03-14 RX ORDER — LOSARTAN POTASSIUM 50 MG/1
50 TABLET ORAL DAILY
Qty: 90 TABLET | Refills: 3 | Status: SHIPPED | OUTPATIENT
Start: 2025-03-14

## 2025-03-14 RX ORDER — LEVOTHYROXINE SODIUM 25 UG/1
25 TABLET ORAL DAILY
Qty: 90 TABLET | Refills: 3 | Status: SHIPPED | OUTPATIENT
Start: 2025-03-14

## 2025-03-14 RX ORDER — DONEPEZIL HYDROCHLORIDE 5 MG/1
5 TABLET, FILM COATED ORAL
Qty: 90 TABLET | Refills: 3 | Status: SHIPPED | OUTPATIENT
Start: 2025-03-14

## 2025-04-09 DIAGNOSIS — M54.50 CHRONIC BILATERAL LOW BACK PAIN WITHOUT SCIATICA: ICD-10-CM

## 2025-04-09 DIAGNOSIS — G89.29 CHRONIC BILATERAL LOW BACK PAIN WITHOUT SCIATICA: ICD-10-CM

## 2025-04-10 NOTE — TELEPHONE ENCOUNTER
Rx Refill Note  Requested Prescriptions     Pending Prescriptions Disp Refills    traMADol (ULTRAM) 50 MG tablet [Pharmacy Med Name: TRAMADOL HCL 50 MG TABLET] 90 tablet 1     Sig: TAKE 1 TABLET BY MOUTH EVERY 8 HOURS AS NEEDED FOR MODERATE PAIN      Last office visit with prescribing clinician: 2/25/2025   Last telemedicine visit with prescribing clinician: Visit date not found   Next office visit with prescribing clinician: Visit date not found                         Would you like a call back once the refill request has been completed: [] Yes [] No    If the office needs to give you a call back, can they leave a voicemail: [] Yes [] No    Addis Orozco MA  04/10/25, 13:36 EDT

## 2025-04-11 RX ORDER — TRAMADOL HYDROCHLORIDE 50 MG/1
50 TABLET ORAL EVERY 8 HOURS PRN
Qty: 90 TABLET | Refills: 1 | Status: SHIPPED | OUTPATIENT
Start: 2025-04-11

## 2025-04-16 ENCOUNTER — TRANSCRIBE ORDERS (OUTPATIENT)
Dept: ADMINISTRATIVE | Facility: HOSPITAL | Age: 85
End: 2025-04-16
Payer: MEDICARE

## 2025-04-16 ENCOUNTER — HOSPITAL ENCOUNTER (OUTPATIENT)
Dept: CARDIOLOGY | Facility: HOSPITAL | Age: 85
Discharge: HOME | End: 2025-04-16
Admitting: SURGERY
Payer: MEDICARE

## 2025-04-16 DIAGNOSIS — G56.01 CARPAL TUNNEL SYNDROME, RIGHT UPPER LIMB: ICD-10-CM

## 2025-04-16 DIAGNOSIS — G56.01 CARPAL TUNNEL SYNDROME, RIGHT UPPER LIMB: Primary | ICD-10-CM

## 2025-04-16 PROCEDURE — 93005 ELECTROCARDIOGRAM TRACING: CPT | Performed by: SURGERY

## 2025-04-17 LAB
QT INTERVAL: 390 MS
QTC INTERVAL: 401 MS

## 2025-05-09 ENCOUNTER — TELEPHONE (OUTPATIENT)
Dept: FAMILY MEDICINE CLINIC | Facility: CLINIC | Age: 85
End: 2025-05-09

## 2025-05-09 NOTE — TELEPHONE ENCOUNTER
Informed patient that Dr. Linares is out the office,    I will send her a your message please give her time to respond.

## 2025-05-09 NOTE — TELEPHONE ENCOUNTER
Caller: Mark Marie    Relationship: Self    Best call back number:   Telephone Information:   Mobile [phone]       What medication are you requesting: DOXYCYCLINE    What are your current symptoms: TICK BITE A COUPLE DAYS AGO - ONLY HAS 1 PILL LEFT - HAD [place] SPOTTED FEVER 2 YEARS AGO FROM A TICK BITE AND DOES NOT WANT TO LET IF GET TO THAT     How long have you been experiencing symptoms:     Have you had these symptoms before:    [x] Yes  [] No    Have you been treated for these symptoms before:   [x] Yes  [] No    If a prescription is needed, what is your preferred pharmacy and phone number: [id number] - [place] [address] [facility] AT [facility] [address] - [phone]  - [phone]      Additional notes: PLEASE CALL PATIENT TO CONFIRM OR DENY PRESCRIPTION REQUEST

## 2025-05-12 RX ORDER — DOXYCYCLINE 100 MG/1
100 CAPSULE ORAL EVERY 12 HOURS SCHEDULED
Qty: 20 CAPSULE | Refills: 1 | OUTPATIENT
Start: 2025-05-12

## 2025-05-13 NOTE — TELEPHONE ENCOUNTER
Left a message to informed patient he will need to make an appointment with one of the other doctors or go to an Urgent Care Center.

## 2025-05-30 RX ORDER — DOXYCYCLINE 100 MG/1
100 CAPSULE ORAL EVERY 12 HOURS SCHEDULED
Qty: 20 CAPSULE | Refills: 1 | Status: SHIPPED | OUTPATIENT
Start: 2025-05-30

## 2025-06-17 DIAGNOSIS — M54.50 CHRONIC BILATERAL LOW BACK PAIN WITHOUT SCIATICA: ICD-10-CM

## 2025-06-17 DIAGNOSIS — G89.29 CHRONIC BILATERAL LOW BACK PAIN WITHOUT SCIATICA: ICD-10-CM

## 2025-06-17 RX ORDER — HYDROCODONE BITARTRATE AND ACETAMINOPHEN 7.5; 325 MG/1; MG/1
1 TABLET ORAL EVERY 6 HOURS
Qty: 60 TABLET | Refills: 0 | Status: SHIPPED | OUTPATIENT
Start: 2025-06-17

## 2025-06-17 NOTE — TELEPHONE ENCOUNTER
Caller: Leonid Marietrace CHACON    Relationship: Self    Best call back number: [phone]    Requested Prescriptions:   Requested Prescriptions     Pending Prescriptions Disp Refills    HYDROcodone-acetaminophen (NORCO) 7.5-325 MG per tablet 60 tablet 0     Sig: Take 1 tablet by mouth Every 6 (Six) Hours.        Pharmacy where request should be sent: [id number] [address] AT [place] [address] - [phone] [facility] [phone]      Last office visit with prescribing clinician: 2/25/2025   Last telemedicine visit with prescribing clinician: Visit date not found   Next office visit with prescribing clinician: Visit date not found     Additional details provided by patient: WILL NEED FILLED FOR HIS CRUISE, LEAVING 7/6/25    Does the patient have less than a 3 day supply:  [] Yes  [x] No    Would you like a call back once the refill request has been completed: [] Yes [x] No    If the office needs to give you a call back, can they leave a voicemail: [] Yes [x] No    Jessica Mon   06/17/25 10:21 EDT

## 2025-07-25 ENCOUNTER — OFFICE VISIT (OUTPATIENT)
Dept: ORTHOPEDIC SURGERY | Facility: CLINIC | Age: 85
End: 2025-07-25
Payer: MEDICARE

## 2025-07-25 VITALS — BODY MASS INDEX: 35.51 KG/M2 | HEIGHT: 64 IN | WEIGHT: 208 LBS | RESPIRATION RATE: 20 BRPM

## 2025-07-25 DIAGNOSIS — G89.29 CHRONIC PAIN OF LEFT KNEE: ICD-10-CM

## 2025-07-25 DIAGNOSIS — M17.0 PRIMARY OSTEOARTHRITIS OF BOTH KNEES: ICD-10-CM

## 2025-07-25 DIAGNOSIS — M25.562 CHRONIC PAIN OF LEFT KNEE: ICD-10-CM

## 2025-07-25 DIAGNOSIS — G89.29 CHRONIC PAIN OF RIGHT KNEE: Primary | ICD-10-CM

## 2025-07-25 DIAGNOSIS — M25.561 CHRONIC PAIN OF RIGHT KNEE: Primary | ICD-10-CM

## 2025-07-25 RX ORDER — TRIAMCINOLONE ACETONIDE 40 MG/ML
80 INJECTION, SUSPENSION INTRA-ARTICULAR; INTRAMUSCULAR
Status: COMPLETED | OUTPATIENT
Start: 2025-07-25 | End: 2025-07-25

## 2025-07-25 RX ORDER — LIDOCAINE HYDROCHLORIDE 10 MG/ML
2 INJECTION, SOLUTION EPIDURAL; INFILTRATION; INTRACAUDAL; PERINEURAL
Status: COMPLETED | OUTPATIENT
Start: 2025-07-25 | End: 2025-07-25

## 2025-07-25 RX ADMIN — LIDOCAINE HYDROCHLORIDE 2 ML: 10 INJECTION, SOLUTION EPIDURAL; INFILTRATION; INTRACAUDAL; PERINEURAL at 10:11

## 2025-07-25 RX ADMIN — TRIAMCINOLONE ACETONIDE 80 MG: 40 INJECTION, SUSPENSION INTRA-ARTICULAR; INTRAMUSCULAR at 10:11

## 2025-07-25 NOTE — PROGRESS NOTES
[facility]        Patient Name: Mark Marie  : 1940  Primary Care Physician: Diana Linares MD        Chief Complaint:    Chief Complaint   Patient presents with    Left Knee - Pain, Initial Evaluation     Pain started months ago but pain has been increasing   Pt wears compression knee sleeves for his knees      Right Knee - Pain, Initial Evaluation     History of meniscus repair         HPI:   History of Present Illness  The patient presents for evaluation of bilateral knee pain.    He has been experiencing pain slightly above and below his knees in both legs, which he describes as a sensation of tightness. The onset of his knee pain was approximately a year ago, initially mild but has progressively worsened. He recalls an incident in the spring when he was gardening and had difficulty standing up due to his knee pain. He also mentions a long-standing back problem that prevents him from bending over. He has been using bands on his legs for support, which have provided some relief. However, he noticed that his leg felt excessively tight after wearing the bands all day. He reports no itching or use of blood thinners. He has found relief from hydrocodone and a topical cream, but these treatments only provide temporary relief. He has been using braces for support, which have been beneficial, but he experienced increased pain two days after using them. He has a history of meniscus cartilage surgery several years ago, followed by x-rays and injections, which have kept his knees relatively pain-free until recently.    He is a diabetic. His last A1c was 6.1 in 2025.    He has carpal tunnel syndrome and had surgery on his hand, but it is still stiff. He had to get an injection in his other hand before he left.          Past Medical/Surgical, Social and Family History:  I have reviewed and/or updated pertinent history as noted in the medical record including:  Past Medical History:   Diagnosis Date     Cataract removed 16 years ago    Diabetes mellitus     Ehrlichiosis, unspecified 6/12/2023    Hiccups 6/12/2023    HL (hearing loss) have worn hearing aids for 16 years    Hyperlipidemia     Hypertension     Hypothyroidism     Joint pain     Low back pain     Non-traumatic rhabdomyolysis 06/09/2023    Obesity (BMI 30-39.9) 6/12/2023    Prostate cancer 2011    Squamous cell carcinoma of skin     right temple     Past Surgical History:   Procedure Laterality Date    HEMORROIDECTOMY      HERNIA REPAIR      inguinal and umbilical    KNEE SURGERY Bilateral     trimmed cartilage    VASECTOMY  50 years ago     Social History     Occupational History    Not on file   Tobacco Use    Smoking status: Never     Passive exposure: Never    Smokeless tobacco: Never   Vaping Use    Vaping status: Never Used   Substance and Sexual Activity    Alcohol use: Not Currently     Comment: rarely;  caffeine 1c qd    Drug use: No    Sexual activity: Defer          Allergies:   Allergies   Allergen Reactions    Ramipril Swelling     Leg swelling       Medications:   Home Medications:  Current Outpatient Medications on File Prior to Visit   Medication Sig    clindamycin (CLEOCIN) 150 MG capsule     donepezil (ARICEPT) 5 MG tablet TAKE 1 TABLET BY MOUTH EVERY DAY AT NIGHT    doxycycline (MONODOX) 100 MG capsule TAKE 1 CAPSULE BY MOUTH TWICE A DAY    HYDROcodone-acetaminophen (NORCO) 7.5-325 MG per tablet Take 1 tablet by mouth Every 6 (Six) Hours.    levothyroxine (SYNTHROID, LEVOTHROID) 25 MCG tablet TAKE 1 TABLET BY MOUTH EVERY DAY    losartan (COZAAR) 50 MG tablet TAKE 1 TABLET BY MOUTH EVERY DAY    metFORMIN (GLUCOPHAGE) 500 MG tablet TAKE 1 TABLET BY MOUTH TWICE A DAY    predniSONE (DELTASONE) 10 MG tablet Take 1 tablet by mouth 2 (Two) Times a Day.    simvastatin (ZOCOR) 80 MG tablet TAKE 1/2 TABLET BY MOUTH EVERY DAY    tamsulosin (FLOMAX) 0.4 MG capsule 24 hr capsule Take 1 capsule by mouth Daily.    traMADol (ULTRAM) 50 MG tablet TAKE  1 TABLET BY MOUTH EVERY 8 HOURS AS NEEDED FOR MODERATE PAIN     No current facility-administered medications on file prior to visit.         ROS:  Negative unless listed in the HPI    Physical Exam:   85 y.o. male  Body mass index is 35.7 kg/m²., 94.3 kg (208 lb)  Vitals:    07/25/25 0906   Resp: 20     General: Alert, cooperative, appears well and in no observable distress.   HEENT: Normocephalic, atraumatic on external visual inspection. No icterus.   CV: No significant peripheral edema.    Respiratory: Normal respiratory effort.   Skin: Warm & well perfused; appropriate skin turgor.  Psych: Appropriate mood & affect.  Neuro: Gross sensation and motor intact in affected extremity/extremities.  Vascular: Peripheral pulses palpable in affected extremity/extremities.     Ortho Exam   Left/right knee. Patient has crepitus throughout range of motion. Positive patellar grind test. Mild effusion. Significant joint line tenderness is noted on the medial aspect of the knee. Patient has a varus orientation of the knee.Range of motion in flexion is from 0-120 degrees. Neurovascular status is intact.  Dorsalis pedis and posterior tibial artery pulses are palpable. Common peroneal nerve function is well preserved. Patient's gait is cautious and antalgic. Skin and soft tissues are mildly swollen, consistent with synovitis and effusion. The patient has a significant limp with the first few steps after starting the gait cycle. Getting out of a chair takes a lot of effort due to pain on knee flexion.       Radiology:  X-Ray Report:  Bilateral knee(s) X-Ray  Indication: Evaluation of pain  AP, Lateral views  Findings: moderate degenerative changes noted bilaterally. No acute injury   Bony lesion: no  Soft tissues: within normal limits  Joint spaces: decreased  Hardware appropriately positioned: not applicable  Prior studies available for comparison: no     Large Joint Arthrocentesis: R knee  Date/Time: 7/25/2025 10:11 AM  Consent  given by: patient  Site marked: site marked  Timeout: Immediately prior to procedure a time out was called to verify the correct patient, procedure, equipment, support staff and site/side marked as required   Supporting Documentation  Indications: pain and diagnostic evaluation   Procedure Details  Location: knee - R knee  Preparation: Patient was prepped and draped in the usual sterile fashion  Needle size: 25 G  Approach: anterolateral  Medications administered: 2 mL lidocaine PF 1% 1 %; 80 mg triamcinolone acetonide 40 MG/ML  Patient tolerance: patient tolerated the procedure well with no immediate complications      Large Joint Arthrocentesis: L knee  Date/Time: 7/25/2025 10:11 AM  Consent given by: patient  Site marked: site marked  Timeout: Immediately prior to procedure a time out was called to verify the correct patient, procedure, equipment, support staff and site/side marked as required   Supporting Documentation  Indications: pain and diagnostic evaluation   Procedure Details  Location: knee - L knee  Preparation: Patient was prepped and draped in the usual sterile fashion  Needle size: 25 G  Approach: anterolateral  Medications administered: 2 mL lidocaine PF 1% 1 %; 80 mg triamcinolone acetonide 40 MG/ML  Patient tolerance: patient tolerated the procedure well with no immediate complications          Assessment:  Assessment & Plan  1. Bilateral knee osteoarthritis:  The bilateral knee pain is likely exacerbated by prolonged sitting during the plane ride and extensive walking during the trip. Knee x-rays show some arthritis, with joint spacing narrower than preferred. Steroid injections will be administered in each knee to alleviate the pain.  Steroid injections will be given in each knee to reduce inflammation and pain. Discussed the potential benefits of pain relief and improved mobility, as well as the risks such as infection, increased blood sugar levels, and potential joint damage with long-term  use. Advised to continue using pain relief creams as needed and to avoid overexertion. Recommended wearing knee braces for support during activities. Encouraged maintaining a healthy weight to reduce stress on the knees. No alternative treatments were discussed.          PROCEDURE  Steroid shots were administered in both knees today.    Body mass index is 35.7 kg/m².  BMI consistent with Obese Class II: 35-39.9kg/m2  Class 2 Severe Obesity (BMI >=35 and <=39.9). Obesity-related health conditions include the following: osteoarthritis. Obesity is newly identified. BMI is is above average; BMI management plan is completed. We discussed portion control, increasing exercise, and Information on healthy weight added to patient's after visit summary.    Follow up in 3 months     Patient encouraged to call with any questions or concerns in the interim    MARLEE Webb     Patient or patient representative verbalized consent for the use of Ambient Listening during the visit with  MARLEE Webb for chart documentation. 7/25/2025  10:08 EDT

## 2025-08-27 RX ORDER — SIMVASTATIN 80 MG
40 TABLET ORAL DAILY
Qty: 45 TABLET | Refills: 7 | Status: SHIPPED | OUTPATIENT
Start: 2025-08-27